# Patient Record
Sex: MALE | Race: WHITE | Employment: OTHER | URBAN - METROPOLITAN AREA
[De-identification: names, ages, dates, MRNs, and addresses within clinical notes are randomized per-mention and may not be internally consistent; named-entity substitution may affect disease eponyms.]

---

## 2017-06-01 ENCOUNTER — HOSPITAL ENCOUNTER (OUTPATIENT)
Dept: LAB | Age: 77
Discharge: HOME | End: 2017-06-01
Attending: INTERNAL MEDICINE
Payer: MEDICARE

## 2017-06-01 DIAGNOSIS — R06.02 SHORTNESS OF BREATH: ICD-10-CM

## 2017-06-01 LAB
ANION GAP BLD CALC-SCNC: 9 MMOL/L
BASOPHILS # BLD AUTO: 0 K/UL (ref 0–0.2)
BASOPHILS # BLD: 1 % (ref 0–2)
BUN SERPL-MCNC: 18 MG/DL (ref 8–23)
CALCIUM SERPL-MCNC: 9.3 MG/DL (ref 8.3–10.4)
CHLORIDE SERPL-SCNC: 102 MMOL/L (ref 98–107)
CO2 SERPL-SCNC: 26 MMOL/L (ref 21–32)
CREAT SERPL-MCNC: 1.2 MG/DL (ref 0.8–1.5)
DIFFERENTIAL METHOD BLD: ABNORMAL
EOSINOPHIL # BLD: 0.2 K/UL (ref 0–0.8)
EOSINOPHIL NFR BLD: 3 % (ref 0.5–7.8)
ERYTHROCYTE [DISTWIDTH] IN BLOOD BY AUTOMATED COUNT: 13.4 % (ref 11.9–14.6)
GLUCOSE SERPL-MCNC: 99 MG/DL (ref 65–100)
HCT VFR BLD AUTO: 42.7 % (ref 41.1–50.3)
HGB BLD-MCNC: 14.5 G/DL (ref 13.6–17.2)
INR PPP: 1 (ref 0.9–1.2)
LYMPHOCYTES # BLD AUTO: 34 % (ref 13–44)
LYMPHOCYTES # BLD: 2.6 K/UL (ref 0.5–4.6)
MCH RBC QN AUTO: 29.9 PG (ref 26.1–32.9)
MCHC RBC AUTO-ENTMCNC: 34 G/DL (ref 31.4–35)
MCV RBC AUTO: 88 FL (ref 79.6–97.8)
MONOCYTES # BLD: 0.6 K/UL (ref 0.1–1.3)
MONOCYTES NFR BLD AUTO: 8 % (ref 4–12)
NEUTS SEG # BLD: 4.1 K/UL (ref 1.7–8.2)
NEUTS SEG NFR BLD AUTO: 54 % (ref 43–78)
PLATELET # BLD AUTO: 344 K/UL (ref 150–450)
PMV BLD AUTO: 8.7 FL (ref 10.8–14.1)
POTASSIUM SERPL-SCNC: 4 MMOL/L (ref 3.5–5.1)
PROTHROMBIN TIME: 10.3 SEC (ref 9.6–12)
RBC # BLD AUTO: 4.85 M/UL (ref 4.23–5.67)
SODIUM SERPL-SCNC: 137 MMOL/L (ref 136–145)
WBC # BLD AUTO: 7.6 K/UL (ref 4.3–11.1)

## 2017-06-01 PROCEDURE — 85610 PROTHROMBIN TIME: CPT | Performed by: INTERNAL MEDICINE

## 2017-06-01 PROCEDURE — 80048 BASIC METABOLIC PNL TOTAL CA: CPT | Performed by: INTERNAL MEDICINE

## 2017-06-01 PROCEDURE — 85025 COMPLETE CBC W/AUTO DIFF WBC: CPT | Performed by: INTERNAL MEDICINE

## 2017-06-01 PROCEDURE — 36415 COLL VENOUS BLD VENIPUNCTURE: CPT | Performed by: INTERNAL MEDICINE

## 2017-06-13 NOTE — PROGRESS NOTES
Patient pre-assessment complete for [facility] poss with DR Chelsie Freitas scheduled for 17 at 9:30am, arrival time 7:30am. Patient verified using . Patient instructed to bring all home medications in labeled bottles on the day of procedure. NPO status reinforced. Patient informed to take a full dose aspirin 325mg  or 81 mg x 4 on the day of procedure. Patient instructed to HOLD lisinopril/hctz. Instructed they can take all other medications excluding vitamins & supplements. Patient verbalizes understanding of all instructions & denies any questions at this time.

## 2017-06-14 ENCOUNTER — HOSPITAL ENCOUNTER (INPATIENT)
Dept: CARDIAC CATH/INVASIVE PROCEDURES | Age: 77
LOS: 5 days | Discharge: HOME HEALTH | DRG: 234 | End: 2017-06-19
Attending: INTERNAL MEDICINE | Admitting: INTERNAL MEDICINE
Payer: MEDICARE

## 2017-06-14 ENCOUNTER — APPOINTMENT (OUTPATIENT)
Dept: GENERAL RADIOLOGY | Age: 77
DRG: 234 | End: 2017-06-14
Attending: THORACIC SURGERY (CARDIOTHORACIC VASCULAR SURGERY)
Payer: MEDICARE

## 2017-06-14 ENCOUNTER — APPOINTMENT (OUTPATIENT)
Dept: ULTRASOUND IMAGING | Age: 77
DRG: 234 | End: 2017-06-14
Attending: THORACIC SURGERY (CARDIOTHORACIC VASCULAR SURGERY)
Payer: MEDICARE

## 2017-06-14 ENCOUNTER — ANESTHESIA EVENT (OUTPATIENT)
Dept: SURGERY | Age: 77
DRG: 234 | End: 2017-06-14
Payer: MEDICARE

## 2017-06-14 DIAGNOSIS — I20.0 UNSTABLE ANGINA (HCC): ICD-10-CM

## 2017-06-14 DIAGNOSIS — E87.0 HYPERNATREMIA: ICD-10-CM

## 2017-06-14 DIAGNOSIS — J98.11 ATELECTASIS: ICD-10-CM

## 2017-06-14 DIAGNOSIS — Z95.1 S/P CABG X 2: ICD-10-CM

## 2017-06-14 DIAGNOSIS — R09.02 HYPOXIA: ICD-10-CM

## 2017-06-14 DIAGNOSIS — Z99.11 ENCOUNTER FOR WEANING FROM VENTILATOR (HCC): ICD-10-CM

## 2017-06-14 LAB
ALBUMIN SERPL BCP-MCNC: 3.3 G/DL (ref 3.2–4.6)
ALBUMIN/GLOB SERPL: 0.9 {RATIO} (ref 1.2–3.5)
ALP SERPL-CCNC: 50 U/L (ref 50–136)
ALT SERPL-CCNC: 35 U/L (ref 12–65)
ANION GAP BLD CALC-SCNC: 11 MMOL/L (ref 7–16)
APPEARANCE UR: CLEAR
APTT PPP: 33.6 SEC (ref 23.5–31.7)
AST SERPL W P-5'-P-CCNC: 24 U/L (ref 15–37)
ATRIAL RATE: 60 BPM
BACTERIA SPEC CULT: NORMAL
BILIRUB SERPL-MCNC: 0.3 MG/DL (ref 0.2–1.1)
BILIRUB UR QL: NEGATIVE
BUN SERPL-MCNC: 13 MG/DL (ref 8–23)
CALCIUM SERPL-MCNC: 8.4 MG/DL (ref 8.3–10.4)
CALCULATED P AXIS, ECG09: 68 DEGREES
CALCULATED R AXIS, ECG10: 45 DEGREES
CALCULATED T AXIS, ECG11: 64 DEGREES
CHLORIDE SERPL-SCNC: 104 MMOL/L (ref 98–107)
CHOLEST SERPL-MCNC: 156 MG/DL
CO2 SERPL-SCNC: 26 MMOL/L (ref 21–32)
COLOR UR: YELLOW
CREAT SERPL-MCNC: 1.31 MG/DL (ref 0.8–1.5)
DIAGNOSIS, 93000: NORMAL
ERYTHROCYTE [DISTWIDTH] IN BLOOD BY AUTOMATED COUNT: 14 % (ref 11.9–14.6)
EST. AVERAGE GLUCOSE BLD GHB EST-MCNC: 143 MG/DL
GLOBULIN SER CALC-MCNC: 3.5 G/DL (ref 2.3–3.5)
GLUCOSE SERPL-MCNC: 142 MG/DL (ref 65–100)
GLUCOSE UR STRIP.AUTO-MCNC: NEGATIVE MG/DL
HBA1C MFR BLD: 6.6 % (ref 4.8–6)
HCT VFR BLD AUTO: 39.6 % (ref 41.1–50.3)
HDLC SERPL-MCNC: 49 MG/DL (ref 40–60)
HDLC SERPL: 3.2 {RATIO}
HGB BLD-MCNC: 13.7 G/DL (ref 13.6–17.2)
HGB UR QL STRIP: NEGATIVE
INR PPP: 1 (ref 0.9–1.2)
KETONES UR QL STRIP.AUTO: NEGATIVE MG/DL
LDLC SERPL CALC-MCNC: 41.2 MG/DL
LEUKOCYTE ESTERASE UR QL STRIP.AUTO: NEGATIVE
LIPID PROFILE,FLP: ABNORMAL
MCH RBC QN AUTO: 30.1 PG (ref 26.1–32.9)
MCHC RBC AUTO-ENTMCNC: 34.6 G/DL (ref 31.4–35)
MCV RBC AUTO: 87 FL (ref 79.6–97.8)
NITRITE UR QL STRIP.AUTO: NEGATIVE
P-R INTERVAL, ECG05: 180 MS
PH UR STRIP: 6 [PH] (ref 5–9)
PLATELET # BLD AUTO: 259 K/UL (ref 150–450)
PMV BLD AUTO: 9.5 FL (ref 10.8–14.1)
POTASSIUM SERPL-SCNC: 3.8 MMOL/L (ref 3.5–5.1)
PROT SERPL-MCNC: 6.8 G/DL (ref 6.3–8.2)
PROT UR STRIP-MCNC: NEGATIVE MG/DL
PROTHROMBIN TIME: 10.6 SEC (ref 9.6–12)
Q-T INTERVAL, ECG07: 402 MS
QRS DURATION, ECG06: 84 MS
QTC CALCULATION (BEZET), ECG08: 402 MS
RBC # BLD AUTO: 4.55 M/UL (ref 4.23–5.67)
SERVICE CMNT-IMP: NORMAL
SODIUM SERPL-SCNC: 141 MMOL/L (ref 136–145)
SP GR UR REFRACTOMETRY: 1.01 (ref 1–1.02)
TRIGL SERPL-MCNC: 329 MG/DL (ref 35–150)
UROBILINOGEN UR QL STRIP.AUTO: 0.2 EU/DL (ref 0.2–1)
VENTRICULAR RATE, ECG03: 60 BPM
VLDLC SERPL CALC-MCNC: 65.8 MG/DL (ref 6–23)
WBC # BLD AUTO: 7.3 K/UL (ref 4.3–11.1)

## 2017-06-14 PROCEDURE — 74011636320 HC RX REV CODE- 636/320: Performed by: INTERNAL MEDICINE

## 2017-06-14 PROCEDURE — 77030015766

## 2017-06-14 PROCEDURE — 71020 XR CHEST PA LAT: CPT

## 2017-06-14 PROCEDURE — 87641 MR-STAPH DNA AMP PROBE: CPT | Performed by: INTERNAL MEDICINE

## 2017-06-14 PROCEDURE — 74011250637 HC RX REV CODE- 250/637: Performed by: THORACIC SURGERY (CARDIOTHORACIC VASCULAR SURGERY)

## 2017-06-14 PROCEDURE — 74011250636 HC RX REV CODE- 250/636: Performed by: INTERNAL MEDICINE

## 2017-06-14 PROCEDURE — 93005 ELECTROCARDIOGRAM TRACING: CPT | Performed by: INTERNAL MEDICINE

## 2017-06-14 PROCEDURE — 36415 COLL VENOUS BLD VENIPUNCTURE: CPT | Performed by: THORACIC SURGERY (CARDIOTHORACIC VASCULAR SURGERY)

## 2017-06-14 PROCEDURE — C1769 GUIDE WIRE: HCPCS

## 2017-06-14 PROCEDURE — 86900 BLOOD TYPING SEROLOGIC ABO: CPT | Performed by: THORACIC SURGERY (CARDIOTHORACIC VASCULAR SURGERY)

## 2017-06-14 PROCEDURE — 80053 COMPREHEN METABOLIC PANEL: CPT | Performed by: THORACIC SURGERY (CARDIOTHORACIC VASCULAR SURGERY)

## 2017-06-14 PROCEDURE — 80061 LIPID PANEL: CPT | Performed by: THORACIC SURGERY (CARDIOTHORACIC VASCULAR SURGERY)

## 2017-06-14 PROCEDURE — 65660000000 HC RM CCU STEPDOWN

## 2017-06-14 PROCEDURE — 74011000250 HC RX REV CODE- 250: Performed by: INTERNAL MEDICINE

## 2017-06-14 PROCEDURE — 85610 PROTHROMBIN TIME: CPT | Performed by: THORACIC SURGERY (CARDIOTHORACIC VASCULAR SURGERY)

## 2017-06-14 PROCEDURE — 74011250636 HC RX REV CODE- 250/636

## 2017-06-14 PROCEDURE — 85730 THROMBOPLASTIN TIME PARTIAL: CPT | Performed by: INTERNAL MEDICINE

## 2017-06-14 PROCEDURE — B2111ZZ FLUOROSCOPY OF MULTIPLE CORONARY ARTERIES USING LOW OSMOLAR CONTRAST: ICD-10-PCS | Performed by: INTERNAL MEDICINE

## 2017-06-14 PROCEDURE — C8929 TTE W OR WO FOL WCON,DOPPLER: HCPCS

## 2017-06-14 PROCEDURE — C1894 INTRO/SHEATH, NON-LASER: HCPCS

## 2017-06-14 PROCEDURE — 77030029997 HC DEV COM RDL R BND TELE -B

## 2017-06-14 PROCEDURE — 93880 EXTRACRANIAL BILAT STUDY: CPT

## 2017-06-14 PROCEDURE — 99152 MOD SED SAME PHYS/QHP 5/>YRS: CPT

## 2017-06-14 PROCEDURE — B2151ZZ FLUOROSCOPY OF LEFT HEART USING LOW OSMOLAR CONTRAST: ICD-10-PCS | Performed by: INTERNAL MEDICINE

## 2017-06-14 PROCEDURE — 93458 L HRT ARTERY/VENTRICLE ANGIO: CPT

## 2017-06-14 PROCEDURE — 83036 HEMOGLOBIN GLYCOSYLATED A1C: CPT | Performed by: THORACIC SURGERY (CARDIOTHORACIC VASCULAR SURGERY)

## 2017-06-14 PROCEDURE — 85027 COMPLETE CBC AUTOMATED: CPT | Performed by: THORACIC SURGERY (CARDIOTHORACIC VASCULAR SURGERY)

## 2017-06-14 PROCEDURE — 86923 COMPATIBILITY TEST ELECTRIC: CPT | Performed by: THORACIC SURGERY (CARDIOTHORACIC VASCULAR SURGERY)

## 2017-06-14 PROCEDURE — 74011250636 HC RX REV CODE- 250/636: Performed by: PHYSICIAN ASSISTANT

## 2017-06-14 PROCEDURE — 77030004534 HC CATH ANGI DX INFN CARD -A

## 2017-06-14 PROCEDURE — 74011250637 HC RX REV CODE- 250/637: Performed by: INTERNAL MEDICINE

## 2017-06-14 PROCEDURE — 4A023N7 MEASUREMENT OF CARDIAC SAMPLING AND PRESSURE, LEFT HEART, PERCUTANEOUS APPROACH: ICD-10-PCS | Performed by: INTERNAL MEDICINE

## 2017-06-14 PROCEDURE — 81003 URINALYSIS AUTO W/O SCOPE: CPT | Performed by: THORACIC SURGERY (CARDIOTHORACIC VASCULAR SURGERY)

## 2017-06-14 RX ORDER — SODIUM CHLORIDE 9 MG/ML
75 INJECTION, SOLUTION INTRAVENOUS CONTINUOUS
Status: DISCONTINUED | OUTPATIENT
Start: 2017-06-14 | End: 2017-06-15

## 2017-06-14 RX ORDER — HEPARIN SODIUM 5000 [USP'U]/100ML
12-25 INJECTION, SOLUTION INTRAVENOUS CONTINUOUS
Status: DISCONTINUED | OUTPATIENT
Start: 2017-06-14 | End: 2017-06-14

## 2017-06-14 RX ORDER — MIDAZOLAM HYDROCHLORIDE 1 MG/ML
.5-2 INJECTION, SOLUTION INTRAMUSCULAR; INTRAVENOUS
Status: DISCONTINUED | OUTPATIENT
Start: 2017-06-14 | End: 2017-06-15

## 2017-06-14 RX ORDER — SODIUM CHLORIDE 0.9 % (FLUSH) 0.9 %
5-10 SYRINGE (ML) INJECTION AS NEEDED
Status: DISCONTINUED | OUTPATIENT
Start: 2017-06-14 | End: 2017-06-15

## 2017-06-14 RX ORDER — LIDOCAINE HYDROCHLORIDE 20 MG/ML
60-200 INJECTION, SOLUTION INFILTRATION; PERINEURAL
Status: DISCONTINUED | OUTPATIENT
Start: 2017-06-14 | End: 2017-06-15

## 2017-06-14 RX ORDER — GUAIFENESIN 100 MG/5ML
81 LIQUID (ML) ORAL DAILY
Status: DISCONTINUED | OUTPATIENT
Start: 2017-06-14 | End: 2017-06-15

## 2017-06-14 RX ORDER — HEPARIN SODIUM 200 [USP'U]/100ML
3 INJECTION, SOLUTION INTRAVENOUS CONTINUOUS
Status: DISCONTINUED | OUTPATIENT
Start: 2017-06-14 | End: 2017-06-15

## 2017-06-14 RX ORDER — GUAIFENESIN 100 MG/5ML
81-324 LIQUID (ML) ORAL ONCE
Status: ACTIVE | OUTPATIENT
Start: 2017-06-14 | End: 2017-06-14

## 2017-06-14 RX ORDER — DIAZEPAM 5 MG/1
5 TABLET ORAL ONCE
Status: ACTIVE | OUTPATIENT
Start: 2017-06-14 | End: 2017-06-14

## 2017-06-14 RX ORDER — AMIODARONE HYDROCHLORIDE 200 MG/1
400 TABLET ORAL 2 TIMES DAILY
Status: DISCONTINUED | OUTPATIENT
Start: 2017-06-14 | End: 2017-06-15

## 2017-06-14 RX ORDER — ATORVASTATIN CALCIUM 40 MG/1
40 TABLET, FILM COATED ORAL
Status: DISCONTINUED | OUTPATIENT
Start: 2017-06-14 | End: 2017-06-15

## 2017-06-14 RX ORDER — HEPARIN SODIUM 5000 [USP'U]/ML
35 INJECTION, SOLUTION INTRAVENOUS; SUBCUTANEOUS ONCE
Status: COMPLETED | OUTPATIENT
Start: 2017-06-14 | End: 2017-06-14

## 2017-06-14 RX ORDER — SODIUM CHLORIDE 0.9 % (FLUSH) 0.9 %
5-10 SYRINGE (ML) INJECTION EVERY 8 HOURS
Status: DISCONTINUED | OUTPATIENT
Start: 2017-06-14 | End: 2017-06-15

## 2017-06-14 RX ORDER — FENTANYL CITRATE 50 UG/ML
25-100 INJECTION, SOLUTION INTRAMUSCULAR; INTRAVENOUS
Status: DISCONTINUED | OUTPATIENT
Start: 2017-06-14 | End: 2017-06-15

## 2017-06-14 RX ORDER — LISINOPRIL AND HYDROCHLOROTHIAZIDE 12.5; 2 MG/1; MG/1
1 TABLET ORAL DAILY
Status: DISCONTINUED | OUTPATIENT
Start: 2017-06-14 | End: 2017-06-14

## 2017-06-14 RX ORDER — CARVEDILOL 3.12 MG/1
3.12 TABLET ORAL 2 TIMES DAILY WITH MEALS
Status: DISCONTINUED | OUTPATIENT
Start: 2017-06-14 | End: 2017-06-15

## 2017-06-14 RX ORDER — HEPARIN SODIUM 5000 [USP'U]/ML
4000 INJECTION, SOLUTION INTRAVENOUS; SUBCUTANEOUS ONCE
Status: COMPLETED | OUTPATIENT
Start: 2017-06-14 | End: 2017-06-14

## 2017-06-14 RX ORDER — HEPARIN SODIUM 5000 [USP'U]/100ML
12-25 INJECTION, SOLUTION INTRAVENOUS
Status: DISCONTINUED | OUTPATIENT
Start: 2017-06-14 | End: 2017-06-15

## 2017-06-14 RX ORDER — SIMVASTATIN 40 MG/1
40 TABLET, FILM COATED ORAL
Status: DISCONTINUED | OUTPATIENT
Start: 2017-06-14 | End: 2017-06-14

## 2017-06-14 RX ADMIN — FENTANYL CITRATE 25 MCG: 50 INJECTION, SOLUTION INTRAMUSCULAR; INTRAVENOUS at 09:36

## 2017-06-14 RX ADMIN — PERFLUTREN 1 ML: 6.52 INJECTION, SUSPENSION INTRAVENOUS at 12:00

## 2017-06-14 RX ADMIN — SODIUM CHLORIDE 75 ML/HR: 900 INJECTION, SOLUTION INTRAVENOUS at 09:15

## 2017-06-14 RX ADMIN — HEPARIN SODIUM 2 ML: 10000 INJECTION, SOLUTION INTRAVENOUS; SUBCUTANEOUS at 09:39

## 2017-06-14 RX ADMIN — HEPARIN SODIUM 3 ML/HR: 200 INJECTION, SOLUTION INTRAVENOUS at 09:38

## 2017-06-14 RX ADMIN — MIDAZOLAM HYDROCHLORIDE 2 MG: 1 INJECTION, SOLUTION INTRAMUSCULAR; INTRAVENOUS at 09:36

## 2017-06-14 RX ADMIN — ATORVASTATIN CALCIUM 40 MG: 40 TABLET, FILM COATED ORAL at 20:52

## 2017-06-14 RX ADMIN — CARVEDILOL 3.12 MG: 3.12 TABLET, FILM COATED ORAL at 17:48

## 2017-06-14 RX ADMIN — AMIODARONE HYDROCHLORIDE 400 MG: 200 TABLET ORAL at 17:48

## 2017-06-14 RX ADMIN — LIDOCAINE HYDROCHLORIDE 60 MG: 20 INJECTION, SOLUTION INFILTRATION; PERINEURAL at 09:38

## 2017-06-14 RX ADMIN — LISINOPRIL AND HYDROCHLOROTHIAZIDE 1 TABLET: 12.5; 2 TABLET ORAL at 11:40

## 2017-06-14 RX ADMIN — Medication 10 ML: at 14:30

## 2017-06-14 RX ADMIN — HEPARIN SODIUM 2750 UNITS: 5000 INJECTION, SOLUTION INTRAVENOUS; SUBCUTANEOUS at 19:20

## 2017-06-14 RX ADMIN — HEPARIN SODIUM AND DEXTROSE 12 UNITS/KG/HR: 5000; 5 INJECTION INTRAVENOUS at 11:39

## 2017-06-14 RX ADMIN — HEPARIN SODIUM 4000 UNITS: 5000 INJECTION, SOLUTION INTRAVENOUS; SUBCUTANEOUS at 11:39

## 2017-06-14 RX ADMIN — IOPAMIDOL 45 ML: 755 INJECTION, SOLUTION INTRAVENOUS at 09:53

## 2017-06-14 NOTE — PROGRESS NOTES
TRANSFER - OUT REPORT:    Verbal report given to Relda Bernheim, RN (name) on Margot Pierson  being transferred to [address] [place] [address] ([facility]) for routine progression of care       Report consisted of patients Situation, Background, Assessment and   Recommendations(SBAR). Information from the following report(s) SBAR was reviewed with the receiving nurse. Lines:   Peripheral IV 06/14/17 Right Antecubital (Active)       Peripheral IV 06/14/17 Left Wrist (Active)        Opportunity for questions and clarification was provided. Patient transported with:   Tech         Pt had LHC via R radial. Site closed with TR band and 11mL @ 0954. Pt received Versed 2mg IV and Fentanyl 25mcg IV.

## 2017-06-14 NOTE — PROGRESS NOTES
Incentive spirometry teaching completed per protocol preoperatively for CABG. Patient achieved 1250 mL, so the goal for to strive for next time was placed at 1250 mL. Patient tolerated well. Pt was encouraged to continue practicing the technique prior to surgery. Will continue to monitor the patient's progress.

## 2017-06-14 NOTE — IP AVS SNAPSHOT
Current Discharge Medication List  
  
START taking these medications Dose & Instructions Dispensing Information Comments Morning Noon Evening Bedtime  
 acetaminophen 325 mg tablet Commonly known as:  TYLENOL Take for pain as needed as on label Quantity:  30 Tab Refills:  0  
     
   
   
   
  
 ferrous sulfate 325 mg (65 mg iron) tablet Your next dose is:  Tomorrow Dose:  325 mg Take 1 Tab by mouth daily (with breakfast). Quantity:  30 Tab Refills:  0  
     
  
   
   
   
  
 furosemide 40 mg tablet Commonly known as:  LASIX Your next dose is:  Tomorrow Dose:  40 mg Take 1 Tab by mouth daily for 6 days. Quantity:  6 Tab Refills:  0  
     
  
   
   
   
  
 metFORMIN 500 mg tablet Commonly known as:  GLUCOPHAGE Your next dose is: Today Dose:  500 mg Take 1 Tab by mouth two (2) times daily (with meals). Quantity:  60 Tab Refills:  1  
     
   
   
  
   
  
 metoprolol succinate 50 mg XL tablet Commonly known as:  TOPROL-XL Your next dose is:  Tomorrow Dose:  50 mg Take 1 Tab by mouth daily. Quantity:  30 Tab Refills:  5  
     
  
   
   
   
  
 potassium chloride 20 mEq tablet Commonly known as:  K-DUR, KLOR-CON Your next dose is:  Tomorrow Dose:  20 mEq Take 1 Tab by mouth daily for 6 days. Quantity:  6 Tab Refills:  0  
     
  
   
   
   
  
 senna-docusate 8.6-50 mg per tablet Commonly known as:  Clenton Plenty Your next dose is: Today Dose:  2 Tab Take 2 Tabs by mouth two (2) times a day. Quantity:  30 Tab Refills:  0  
     
   
   
   
  
  
 traMADol 50 mg tablet Commonly known as:  ULTRAM  
   
 Dose:  50 mg Take 1 Tab by mouth every six (6) hours as needed. Max Daily Amount: 200 mg. Quantity:  30 Tab Refills:  1 CONTINUE these medications which have NOT CHANGED Dose & Instructions Dispensing Information Comments Morning Noon Evening Bedtime  
 aspirin 81 mg chewable tablet Your next dose is:  Tomorrow Dose:  81 mg Take 81 mg by mouth daily. Refills:  0  
     
  
   
   
   
  
 lisinopril-hydroCHLOROthiazide 20-12.5 mg per tablet Commonly known as:  Ranjana Oscar Your next dose is:  Tomorrow Take  by mouth daily. Refills:  0  
     
  
   
   
   
  
 multivitamin tablet Commonly known as:  ONE A DAY Your next dose is:  Tomorrow Dose:  1 Tab Take 1 Tab by mouth daily. Refills:  0  
     
  
   
   
   
  
 simvastatin 40 mg tablet Commonly known as:  ZOCOR Your next dose is: Today Dose:  40 mg Take 40 mg by mouth nightly. Refills:  0 Where to Get Your Medications Information on where to get these meds will be given to you by the nurse or doctor. ! Ask your nurse or doctor about these medications  
  acetaminophen 325 mg tablet  
 ferrous sulfate 325 mg (65 mg iron) tablet  
 furosemide 40 mg tablet  
 metFORMIN 500 mg tablet  
 metoprolol succinate 50 mg XL tablet  
 potassium chloride 20 mEq tablet  
 senna-docusate 8.6-50 mg per tablet  
 traMADol 50 mg tablet

## 2017-06-14 NOTE — IP AVS SNAPSHOT
Cara Oscar 
 
 
 [address] [facility] [address] [facility] 
[phone] Patient: Lourdes Curling MRN: [id number] FUT:[id number] You are allergic to the following No active allergies Immunizations Administered for This Admission Name Date  
 TB Skin Test (PPD) Intradermal 6/15/2017 Recent Documentation Height Weight BMI Smoking Status 1.676 m 83.7 kg 29.78 kg/m2 Never Smoker Emergency Contacts Name Discharge Info Relation Home Work Mobile Alejandra 27 CAREGIVER [3] Spouse [3] [phone] About your hospitalization You were admitted on:  June 14, 2017 You last received care in the:  [facility] 2 CV STEPDOWN You were discharged on:  June 19, 2017 Unit phone number:  [phone] Why you were hospitalized Your primary diagnosis was:  Cad (Coronary Artery Disease) Your diagnoses also included:  Unstable Angina (Hcc), S/P Cabg X 2, Hypoxia, Hypernatremia, Atelectasis, Diabetes Mellitus, New Onset (Hcc) Providers Seen During Your Hospitalizations Provider Role Specialty Primary office phone Rosibel Joshi MD Attending Provider Cardiology [phone] Your Primary Care Physician (PCP) Primary Care Physician Office Phone Office Fax [facility] [phone] Follow-up Information Follow up With Details Comments Contact Info Angella De Leon MD   [facility] [address] [address] 
[phone] Debi Bedoya MD On 7/13/2017 On Thursday at 2:00 pm [address] [place] [address] [place] [id number] [phone] Estela On 6/26/2017 On Monday at 9:30 am DR. [facility] 
[phone] Your Appointments Monday June 26, 2017  9:30 AM EDT TRANSITIONAL CARE MANAGEMENT with Vito Grossman MD  
 [facility] ([address] [place] [address]) [address]  
[phone] Thursday July 13, 2017  2:00 PM EDT Discharge Followup with Melia Tenorio MD  
[address] [facility] ([facility] [address] Karyn [address] [place] [id number]  
[phone] Current Discharge Medication List  
  
START taking these medications Dose & Instructions Dispensing Information Comments Morning Noon Evening Bedtime  
 acetaminophen 325 mg tablet Commonly known as:  TYLENOL Take for pain as needed as on label Quantity:  30 Tab Refills:  0  
     
   
   
   
  
 ferrous sulfate 325 mg (65 mg iron) tablet Your next dose is:  Tomorrow Dose:  325 mg Take 1 Tab by mouth daily (with breakfast). Quantity:  30 Tab Refills:  0  
     
  
   
   
   
  
 furosemide 40 mg tablet Commonly known as:  LASIX Your next dose is:  Tomorrow Dose:  40 mg Take 1 Tab by mouth daily for 6 days. Quantity:  6 Tab Refills:  0  
     
  
   
   
   
  
 metFORMIN 500 mg tablet Commonly known as:  GLUCOPHAGE Your next dose is: Today Dose:  500 mg Take 1 Tab by mouth two (2) times daily (with meals). Quantity:  60 Tab Refills:  1  
     
   
   
  
   
  
 metoprolol succinate 50 mg XL tablet Commonly known as:  TOPROL-XL Your next dose is:  Tomorrow Dose:  50 mg Take 1 Tab by mouth daily. Quantity:  30 Tab Refills:  5  
     
  
   
   
   
  
 potassium chloride 20 mEq tablet Commonly known as:  K-DUR, KLOR-CON Your next dose is:  Tomorrow Dose:  20 mEq Take 1 Tab by mouth daily for 6 days. Quantity:  6 Tab Refills:  0  
     
  
   
   
   
  
 senna-docusate 8.6-50 mg per tablet Commonly known as:  Jeral Bevel Your next dose is: Today Dose:  2 Tab Take 2 Tabs by mouth two (2) times a day. Quantity:  30 Tab Refills:  0  
     
   
   
   
  
  
 traMADol 50 mg tablet Commonly known as:  ULTRAM  
   
 Dose:  50 mg Take 1 Tab by mouth every six (6) hours as needed. Max Daily Amount: 200 mg. Quantity:  30 Tab Refills:  1 CONTINUE these medications which have NOT CHANGED Dose & Instructions Dispensing Information Comments Morning Noon Evening Bedtime  
 aspirin 81 mg chewable tablet Your next dose is:  Tomorrow Dose:  81 mg Take 81 mg by mouth daily. Refills:  0  
     
  
   
   
   
  
 lisinopril-hydroCHLOROthiazide 20-12.5 mg per tablet Commonly known as:  Finesse Phalen Your next dose is:  Tomorrow Take  by mouth daily. Refills:  0  
     
  
   
   
   
  
 multivitamin tablet Commonly known as:  ONE A DAY Your next dose is:  Tomorrow Dose:  1 Tab Take 1 Tab by mouth daily. Refills:  0  
     
  
   
   
   
  
 simvastatin 40 mg tablet Commonly known as:  ZOCOR Your next dose is: Today Dose:  40 mg Take 40 mg by mouth nightly. Refills:  0 Where to Get Your Medications Information on where to get these meds will be given to you by the nurse or doctor. ! Ask your nurse or doctor about these medications  
  acetaminophen 325 mg tablet  
 ferrous sulfate 325 mg (65 mg iron) tablet  
 furosemide 40 mg tablet  
 metFORMIN 500 mg tablet  
 metoprolol succinate 50 mg XL tablet  
 potassium chloride 20 mEq tablet  
 senna-docusate 8.6-50 mg per tablet  
 traMADol 50 mg tablet Discharge Instructions DISCHARGE SUMMARY from Nurse The following personal items are in your possession at time of discharge: 
 
Dental Appliances: Uppers, Partials Visual Aid: Glasses, With patient Home Medications: Sent home Jewelry: Ring Clothing: At bedside, Footwear, Pants, Shirt, Socks Other Valuables: None PATIENT INSTRUCTIONS: 
 
 
F-face looks uneven A-arms unable to move or move unevenly S-speech slurred or non-existent T-time-call 911 as soon as signs and symptoms begin-DO NOT go Back to bed or wait to see if you get better-TIME IS BRAIN. Warning Signs of HEART ATTACK Call 911 if you have these symptoms: 
? Chest discomfort. Most heart attacks involve discomfort in the center of the chest that lasts more than a few minutes, or that goes away and comes back. It can feel like uncomfortable pressure, squeezing, fullness, or pain. ? Discomfort in other areas of the upper body. Symptoms can include pain or discomfort in one or both arms, the back, neck, jaw, or stomach. ? Shortness of breath with or without chest discomfort. ? Other signs may include breaking out in a cold sweat, nausea, or lightheadedness. Don't wait more than five minutes to call [address]! Fast action can save your life. Calling 911 is almost always the fastest way to get lifesaving treatment. Emergency Medical Services staff can begin treatment when they arrive  up to an hour sooner than if someone gets to the hospital by car. The discharge information has been reviewed with the patient. The patient verbalized understanding. Discharge medications reviewed with the patient and appropriate educational materials and side effects teaching were provided. Discharge Instructions Attachments/References CORONARY ARTERY BYPASS GRAFT: POST-OP (ENGLISH) CARDIAC REHABILITATION (ENGLISH) HEALTHY DIET: HEART (ENGLISH) HEART ATTACK: MEDICINE TO REDUCE RISK (ENGLISH) SECONDHAND SMOKE (ENGLISH) Discharge Orders Procedure Order Date Status Priority Quantity Spec Type Associated Dx REFERRAL TO CARDIAC REHAB 06/19/17 1031 Normal Routine 1  S/P CABG x 2 [[id number]] [employer] Announcement  We are excited to announce that we are making your provider's discharge notes available to you in deltamethod. You will see these notes when they are completed and signed by the physician that discharged you from your recent hospital stay. If you have any questions or concerns about any information you see in deltamethod, please call the Health Information Department where you were seen or reach out to your Primary Care Provider for more information about your plan of care. Introducing [facility] HE[facility]! Latasha Barth introduces deltamethod patient portal. Now you can access parts of your medical record, email your doctor's office, and request medication refills online. 1. In your internet browser, go to [email or web address] Maps 2. Click on the First Time User? Click Here link in the Sign In box. You will see the New Member Sign Up page. 3. Enter your deltamethod Access Code exactly as it appears below. You will not need to use this code after youve completed the sign-up process. If you do not sign up before the expiration date, you must request a new code. · deltamethod Access Code: [email or web address] Expires: 8/2/2017  1:38 PM 
 
4. Enter the last four digits of your Social Security Number (xxxx) and Date of Birth (mm/dd/yyyy) as indicated and click Submit. You will be taken to the next sign-up page. 5. Create a deltamethod ID. This will be your deltamethod login ID and cannot be changed, so think of one that is secure and easy to remember. 6. Create a deltamethod password. You can change your password at any time. 7. Enter your Password Reset Question and Answer. This can be used at a later time if you forget your password. 8. Enter your e-mail address. You will receive e-mail notification when new information is available in [address] 9. Click Sign Up. You can now view and download portions of your medical record. 10. Click the Download Summary menu link to download a portable copy of your medical information. If you have questions, please visit the Frequently Asked Questions section of the Pet[employer] website. Remember, Pet[employer]ytes is NOT to be used for urgent needs. For medical emergencies, dial 911. Now available from your iPhone and Android! General Information Please provide this summary of care documentation to your next provider. Patient Signature:  ____________________________________________________________ Date:  ____________________________________________________________  
  
Angie Lightning Provider Signature:  ____________________________________________________________ Date:  ____________________________________________________________ More Information Coronary Artery Bypass Graft: What to Expect at Home Your Recovery Coronary artery bypass graft (CABG) is surgery to treat coronary artery disease. The surgery helps blood make a detour, or bypass, around one or more narrowed or blocked coronary arteries. Coronary arteries are the blood vessels that bring blood to the heart. Your doctor did the surgery through a cut, called an incision, in your chest. 
You will feel tired and sore for the first few weeks after surgery. You may have some brief, sharp pains on either side of your chest. Your chest, shoulders, and upper back may ache. The incision in your chest and the area where the healthy vein was taken may be sore or swollen. These symptoms usually get better after 4 to 6 weeks. You will probably be able to do many of your usual activities after 4 to 6 weeks. But for 2 to 3 months you will not be able to lift heavy objects or do activities that strain your chest or upper arm muscles. At first you may notice that you get tired easily and need to rest often. It may take 1 to 2 months to get your energy back. Some people find that they are more emotional after this surgery.  You may cry easily or show emotion in ways that are unusual for you. This is common and may last for up to a year. Some people get depressed after CABG surgery. Talk with your doctor if you have sadness that continues or you are concerned about how you are feeling. Treatment and other support can help you feel better. Even though the surgery may improve your symptoms, you will still need to make changes in your lifestyle to lower your risk of a heart attack or stroke. It will be important to eat a heart-healthy diet, get regular exercise, not smoke, take your heart medicines, and reduce stress. You will likely start a cardiac rehabilitation (rehab) program in the hospital. You will continue with this rehab program after you go home to help you recover and prevent problems with your heart. Talk to your doctor about whether rehab is right for you. This care sheet gives you a general idea about how long it will take for you to recover. But each person recovers at a different pace. Follow the steps below to get better as quickly as possible. How can you care for yourself at home? Activity · Rest when you feel tired. Getting enough sleep will help you recover. Try to sleep on your back for 4 to 6 weeks while your breastbone (sternum) heals. This usually takes about 4 to 6 weeks. · Try to walk each day. Start by walking a little more than you did the day before. Bit by bit, increase the amount you walk. Walking boosts blood flow and helps prevent pneumonia and constipation. · Avoid strenuous activities, such as bicycle riding, jogging, weight lifting, or heavy aerobic exercise, until your doctor says it is okay. · For 3 months, avoid activities that strain your chest or upper arm muscles. This includes pushing a  or vacuum, mopping floors, or swinging a golf club or tennis racquet. · For 2 to 3 months, avoid lifting anything that would make you strain. This may include a child, heavy grocery bags and milk containers, a heavy briefcase or backpack, or cat litter or dog food bags. · Hold a pillow firmly over your chest incision when you cough or take deep breaths. This will support your chest and reduce your pain. · Do breathing exercises at home as instructed by your doctor. This will help prevent pneumonia. · Ask your doctor when you can drive again. · You will probably need to take 4 to 12 weeks off from work. It depends on the type of work you do and how you feel. · You may shower as usual. Pat the incision dry. Do not take a bath for the first 3 weeks, or until your doctor tells you it is okay. · Do not swim or use a hot tub for at least 1 month, or until your doctor says it is okay. · Ask your doctor when it is okay for you to have sex. Diet · Eat a heart-healthy diet. If you have not been eating this way, talk to your doctor. You also may want to talk to a dietitian. A dietitian can help you learn about healthy foods. · Drink plenty of fluids (unless your doctor tells you not to). · You may notice that your bowel movements are not regular right after your surgery. This is common. Try to avoid constipation and straining with bowel movements. You may want to take a fiber supplement every day. If you have not had a bowel movement after a couple of days, ask your doctor about taking a mild laxative. Medicines · Your doctor will tell you if and when you can restart your medicines. He or she will also give you instructions about taking any new medicines. · If you take blood thinners, such as warfarin (Coumadin), clopidogrel (Plavix), or aspirin, be sure to talk to your doctor. He or she will tell you if and when to start taking those medicines again. Make sure that you understand exactly what your doctor wants you to do.  
· Your doctor may give you medicines to prevent blood clots, keep your heartbeat steady, and lower your blood pressure and cholesterol. Take your medicines exactly as prescribed. Call your doctor if you think you are having a problem with your medicine. · Be safe with medicines. Take pain medicines exactly as directed. ¨ If the doctor gave you a prescription medicine for pain, take it as prescribed. ¨ If you are not taking a prescription pain medicine, ask your doctor if you can take an over-the-counter medicine. ¨ Do not take aspirin, ibuprofen (Advil, Motrin), naproxen (Aleve), or other nonsteroidal anti-inflammatory drugs (NSAIDs) unless your doctor says it is okay. · If you think your pain medicine is making you sick to your stomach: 
¨ Take your medicine after meals (unless your doctor has told you not to). ¨ Ask your doctor for a different pain medicine. · If your doctor prescribed antibiotics, take them as directed. Do not stop taking them just because you feel better. You need to take the full course of antibiotics. Incision care · If you have strips of tape on the incisions the doctor made, leave the tape on for a week or until it falls off. · Wash the area daily with warm, soapy water, and pat it dry. Don't use hydrogen peroxide or alcohol, which can slow healing. You may cover the area with a gauze bandage if it weeps or rubs against clothing. Change the bandage every day. · Keep the area clean and dry. · If you have an incision in your leg: ¨ Wear support stockings on your legs during the day for the first 2 weeks. You can take the stockings off at night while you sleep. ¨ Raise your legs above the level of your heart whenever you lay down for the first 4 to 6 weeks. Other instructions · Keep track of your weight. Weigh yourself every day at the same time of day, on the same scale, in the same amount of clothing. A sudden increase in weight can be a sign of a problem with your heart.  Tell your doctor if you suddenly gain weight, such as 3 pounds or more in 2 to 3 days. · Do not smoke. Smoking can make it harder for you to recover and it will raise the chances of your arteries narrowing again. If you need help quitting, talk to your doctor about stop-smoking programs and medicines. These can increase your chances of quitting for good. Follow-up care is a key part of your treatment and safety. Be sure to make and go to all appointments, and call your doctor if you are having problems. It's also a good idea to know your test results and keep a list of the medicines you take. When should you call for help? Call 911 anytime you think you may need emergency care. For example, call if: 
· You passed out (lost consciousness). · You have severe trouble breathing. · You have sudden chest pain and shortness of breath, or you cough up blood. · You have severe pain in your chest. 
· You have symptoms of a heart attack. These may include: ¨ Chest pain or pressure, or a strange feeling in the chest. 
¨ Sweating. ¨ Shortness of breath. ¨ Nausea or vomiting. ¨ Pain, pressure, or a strange feeling in the back, neck, jaw, or upper belly or in one or both shoulders or arms. ¨ Lightheadedness or sudden weakness. ¨ A fast or irregular heartbeat. After you call 911, the  may tell you to chew 1 adult-strength or 2 to 4 low-dose aspirin. Wait for an ambulance. Do not try to drive yourself. · You have angina symptoms (such as chest pain or pressure) that do not go away with rest or are not getting better within 5 minutes after you take a dose of nitroglycerin. Call your doctor now or seek immediate medical care if: 
· You have pain that does not get better after you take pain medicine. · You have a fever over 100°F. 
· You have loose stitches, or your incision comes open. · Bright red blood has soaked through the bandage over your incision. · You have signs of infection, such as: ¨ Increased pain, swelling, warmth, or redness. ¨ Red streaks leading from the incision. ¨ Pus draining from the incision. ¨ Swollen lymph nodes in your neck, armpits, or groin. ¨ A fever. · You have signs of a blood clot, such as: 
¨ Pain in your calf, back of the knee, thigh, or groin. ¨ Redness and swelling in your leg or groin. · Your heartbeat feels very fast or slow, skips beats, or flutters. · You are dizzy or lightheaded, or you feel like you may faint. · You have new or increased shortness of breath. Watch closely for changes in your health, and be sure to contact your doctor if: 
· You gain weight suddenly, such as 3 pounds or more in 2 to 3 days. · You have increased swelling in your legs, ankles, or feet. · You have any concerns about your incision. · You feel very sad or have other signs of depression, such as trouble sleeping or eating. · You have questions about diet, exercise, quitting smoking, or stress reduction after surgery. Where can you learn more? Go to http://mamtaSpoonfeddixon.info/. Enter F759 in the search box to learn more about \"Coronary Artery Bypass Graft: What to Expect at Home. \" Current as of: January 27, 2016 Content Version: 11.2 © 7918-1653 [employer]. Care instructions adapted under license by [employer] (which disclaims liability or warranty for this information). If you have questions about a medical condition or this instruction, always ask your healthcare professional. [place] [address] any warranty or liability for your use of this information. Cardiac Rehabilitation: Care Instructions Your Care Instructions Cardiac rehabilitation is a program for people who have a heart problem, such as a heart attack, heart failure, or a heart valve disease.  The program includes exercise, lifestyle changes, education, and emotional support. Cardiac rehab can help you improve the quality of your life through better overall health. It can help you lose weight and feel better about yourself. On your cardiac rehab team, you may have your doctor, a nurse specialist, a dietitian, and a physical therapist. They will design your cardiac rehab program specifically for you. You will learn how to reduce your risk for heart problems, how to manage stress, and how to eat a heart-healthy diet. By the end of the program, you will be ready to maintain a healthier lifestyle on your own. Follow-up care is a key part of your treatment and safety. Be sure to make and go to all appointments, and call your doctor if you are having problems. It's also a good idea to know your test results and keep a list of the medicines you take. How can you care for yourself at home? · Take your medicines exactly as prescribed. Call your doctor if you think you are having a problem with your medicine. You will get more details on the specific medicines your doctor prescribes. · Weigh yourself every day if your doctor tells you to. Watch for sudden weight gain. Weigh yourself on the same scale with the same amount of clothing at the same time of day. · Plan your meals so that you are eating heart-healthy foods. ¨ Eat a variety of foods daily. Fresh fruits and vegetables and whole-grains are good choices. ¨ Limit your fat intake, especially saturated and trans fat. ¨ Limit salt (sodium). ¨ Increase fiber in your diet. ¨ Limit alcohol. · Learn how to take your pulse so that you can track your heart rate during exercise. · Always check with your doctor before you begin a new exercise program. 
· Warm up before you exercise and cool down afterward for at least 15 minutes each. This will help your heart gradually prepare for and recover from exercise and avoid pushing your heart too hard. · Stop exercising if you have any unusual discomfort, such as chest pain. · Do not smoke. Smoking can make heart problems worse. If you need help quitting, talk to your doctor about stop-smoking programs and medicines. These can increase your chances of quitting for good. When should you call for help? Call 911 anytime you think you may need emergency care. For example, call if: 
· You have severe trouble breathing. · You cough up pink, foamy mucus and you have trouble breathing. · You have symptoms of a heart attack. These may include: ¨ Chest pain or pressure, or a strange feeling in the chest. 
¨ Sweating. ¨ Shortness of breath. ¨ Nausea or vomiting. ¨ Pain, pressure, or a strange feeling in the back, neck, jaw, or upper belly or in one or both shoulders or arms. ¨ Lightheadedness or sudden weakness. ¨ A fast or irregular heartbeat. After you call 911, the  may tell you to chew 1 adult-strength or 2 to 4 low-dose aspirin. Wait for an ambulance. Do not try to drive yourself. · You have angina symptoms (such as chest pain or pressure) that do not go away with rest or are not getting better within 5 minutes after you take a dose of nitroglycerin. · You have symptoms of a stroke. These may include: 
¨ Sudden numbness, tingling, weakness, or loss of movement in your face, arm, or leg, especially on only one side of your body. ¨ Sudden vision changes. ¨ Sudden trouble speaking. ¨ Sudden confusion or trouble understanding simple statements. ¨ Sudden problems with walking or balance. ¨ A sudden, severe headache that is different from past headaches. · You passed out (lost consciousness). Call your doctor now or seek immediate medical care if: 
· You have new or increased shortness of breath. · You are dizzy or lightheaded, or you feel like you may faint. · You gain weight suddenly, such as 3 pounds or more in 2 to 3 days. (Your doctor may suggest a different range of weight gain.) · You have increased swelling in your legs, ankles, or feet. Watch closely for changes in your health, and be sure to contact your doctor if you have any problems. Where can you learn more? Go to http://mamta-dixon.info/. Enter D805 in the search box to learn more about \"Cardiac Rehabilitation: Care Instructions. \" Current as of: May 5, 2016 Content Version: 11.2 © 6721-0285 [employer]. Care instructions adapted under license by [employer] (which disclaims liability or warranty for this information). If you have questions about a medical condition or this instruction, always ask your healthcare professional. [address] any warranty or liability for your use of this information. Heart-Healthy Diet: Care Instructions Your Care Instructions A heart-healthy diet has lots of vegetables, fruits, nuts, beans, and whole grains, and is low in salt. It limits foods that are high in saturated fat, such as meats, cheeses, and fried foods. It may be hard to change your diet, but even small changes can lower your risk of heart attack and heart disease. Follow-up care is a key part of your treatment and safety. Be sure to make and go to all appointments, and call your doctor if you are having problems. It's also a good idea to know your test results and keep a list of the medicines you take. How can you care for yourself at home? Watch your portions · Learn what a serving is. A \"serving\" and a \"portion\" are not always the same thing. Make sure that you are not eating larger portions than are recommended. For example, a serving of pasta is ½ cup. A serving size of meat is 2 to 3 ounces. A 3-ounce serving is about the size of a deck of cards. Measure serving sizes until you are good at [place]" them. Keep in mind that restaurants often serve portions that are 2 or 3 times the size of one serving.  
· To keep your energy level up and keep you from feeling hungry, eat often but in smaller portions. · Eat only the number of calories you need to stay at a healthy weight. If you need to lose weight, eat fewer calories than your body burns (through exercise and other physical activity). Eat more fruits and vegetables · Eat a variety of fruit and vegetables every day. Dark green, deep orange, red, or yellow fruits and vegetables are especially good for you. Examples include spinach, carrots, peaches, and berries. · Keep carrots, celery, and other veggies handy for snacks. Buy fruit that is in season and store it where you can see it so that you will be tempted to eat it. · Cook dishes that have a lot of veggies in them, such as stir-fries and soups. Limit saturated and trans fat · Read food labels, and try to avoid saturated and trans fats. They increase your risk of heart disease. Trans fat is found in many processed foods such as cookies and crackers. · Use olive or canola oil when you cook. Try cholesterol-lowering spreads, such as Benecol or Take Control. · Bake, broil, grill, or steam foods instead of frying them. · Choose lean meats instead of high-fat meats such as hot dogs and sausages. Cut off all visible fat when you prepare meat. · Eat fish, skinless poultry, and meat alternatives such as soy products instead of high-fat meats. Soy products, such as tofu, may be especially good for your heart. · Choose low-fat or fat-free milk and dairy products. Eat fish · Eat at least two servings of fish a week. Certain fish, such as salmon and tuna, contain omega-3 fatty acids, which may help reduce your risk of heart attack. Eat foods high in fiber · Eat a variety of grain products every day. Include whole-grain foods that have lots of fiber and nutrients. Examples of whole-grain foods include oats, whole wheat bread, and brown rice. · Buy whole-grain breads and cereals, instead of white bread or pastries. Limit salt and sodium · Limit how much salt and sodium you eat to help lower your blood pressure. · Taste food before you salt it. Add only a little salt when you think you need it. With time, your taste buds will adjust to less salt. · Eat fewer snack items, fast foods, and other high-salt, processed foods. Check food labels for the amount of sodium in packaged foods. · Choose low-sodium versions of canned goods (such as soups, vegetables, and beans). Limit sugar · Limit drinks and foods with added sugar. These include candy, desserts, and soda pop. Limit alcohol · Limit alcohol to no more than 2 drinks a day for men and 1 drink a day for women. Too much alcohol can cause health problems. When should you call for help? Watch closely for changes in your health, and be sure to contact your doctor if: 
· You would like help planning heart-healthy meals. Where can you learn more? Go to http://mamtaStem Cell Therapeuticsdixon.info/. Enter V137 in the search box to learn more about \"Heart-Healthy Diet: Care Instructions. \" Current as of: January 27, 2016 Content Version: 11.2 © 8598-7299 [employer]. Care instructions adapted under license by [employer] (which disclaims liability or warranty for this information). If you have questions about a medical condition or this instruction, always ask your healthcare professional. [address] any warranty or liability for your use of this information. Reducing Heart Attack Risk With Daily Medicine: Care Instructions Your Care Instructions Heart disease is the number one cause of death. If you are at risk for heart disease, there are many medicines that can reduce your risk. These include: · ACE inhibitors. These are a type of blood pressure medicine. They can reduce the risk of heart attacks and strokes if you are at high risk. · Statin medicines. These lower cholesterol.  They can also reduce the risk of heart disease and strokes. · Aspirin. It can help certain people lower their risk of a heart attack or stroke. · Beta-blocker medicines. These are a type of blood pressure and heart medicine. They can reduce the chance of early death if you have had a heart attack. All medicines can cause side effects. So it is important to understand the pros and cons of any medicine you take. It is also important to take your medicines exactly as your doctor tells you to. Follow-up care is a key part of your treatment and safety. Be sure to make and go to all appointments, and call your doctor if you are having problems. It's also a good idea to know your test results and keep a list of the medicines you take. ACE inhibitors ACE (angiotensin-converting enzyme) inhibitors are used for three main reasons. They lower blood pressure, protect the kidneys, and prevent heart attacks and strokes. Examples include benazepril (Lotensin), lisinopril (Prinivil, Zestril), and ramipril (Altace). Before you start taking an ACE inhibitor, make sure your doctor knows if: 
· You are taking a water pill (diuretic). · You are taking potassium pills or using salt substitutes. · You are pregnant or breastfeeding. · You have had a kidney transplant or other kidney problems. ACE inhibitors can cause side effects. Call your doctor right away if you have: · Trouble breathing. · Swelling in your face, head, neck, or tongue. · Dizziness or lightheadedness. · A dry cough. Statins Statins lower cholesterol. Examples include atorvastatin (Lipitor), lovastatin (Mevacor), pravastatin (Pravachol), and simvastatin (Zocor). Before you start taking a statin, make sure your doctor knows if: 
· You have had a kidney transplant or other kidney problems. · You have liver disease. · You take any other prescription medicine, over-the-counter medicine, vitamins, supplements, or herbal remedies. · You are pregnant or breastfeeding. Statins can cause side effects. Call your doctor right away if you have: · New, severe muscle aches. · Brown urine. Aspirin Taking an aspirin every day can lower your risk for a heart attack. A heart attack occurs when a blood vessel in the heart gets blocked. When this happens, oxygen can't get to the heart muscle, and part of the heart dies. Aspirin can help prevent blood clots that can block the blood vessels. Talk to your doctor before you start taking aspirin every day. He or she may recommend that you take one low-dose aspirin (81 mg) tablet each day, with a meal and a full glass of water. Taking aspirin isn't right for everyone, because it can cause serious bleeding. And you may not be able to use aspirin if you: 
· Have asthma. · Have an ulcer or other stomach problem. · Take some other medicine (called a blood thinner) that prevents blood clots. · Are allergic to aspirin. Before having a surgery or procedure, tell your doctor or dentist that you take aspirin. He or she will tell you if you should stop taking aspirin beforehand. Make sure that you understand exactly what your doctor wants you to do. Aspirin can cause side effects. Call your doctor right away if you have: · Unusual bleeding or bruising. · Nausea, vomiting, or heartburn. · Black or bloody stools. Beta-blockers Beta-blockers are used for three main reasons. They lower blood pressure, relieve angina symptoms (such as chest pain or pressure), and reduce the chances of a second heart attack. They include atenolol (Tenormin), carvedilol (Coreg), and metoprolol (Lopressor). Before you start taking a beta-blocker, make sure your doctor knows if you have: · Severe asthma or frequent asthma attacks. · A very slow pulse (less than 55 beats a minute). Beta-blockers can cause side effects. Call your doctor right away if you have: · Wheezing or trouble breathing. · Dizziness or lightheadedness. · Asthma that gets worse. When should you call for help? Call 911 anytime you think you may need emergency care. For example, call if: 
· You passed out (lost consciousness). Call your doctor now or seek immediate medical care if: 
· You are wheezing or have trouble breathing. · You have swelling in your face, head, neck, or tongue. · You are dizzy or lightheaded, or you feel like you may faint. · You have severe muscle pain, weakness, or brown urine. · You have vision problems. · You have new bruises or blood spots under your skin. · Your stools are black and tarlike or have streaks of blood. Watch closely for changes in your health, and be sure to contact your doctor if: 
· You have ringing in your ears. · You feel very tired. · You have gas, constipation, or an upset stomach. Where can you learn more? Go to http://mamta-dixon.info/. Enter R428 in the search box to learn more about \"Reducing Heart Attack Risk With Daily Medicine: Care Instructions. \" Current as of: March 28, 2016 Content Version: 11.2 © 5107-8434 [employer]. Care instructions adapted under license by [employer] (which disclaims liability or warranty for this information). If you have questions about a medical condition or this instruction, always ask your healthcare professional. [place] [address] any warranty or liability for your use of this information. Secondhand Smoke: Care Instructions Your Care Instructions Secondhand smoke comes from the burning end of a cigarette, cigar, or pipe and the smoke that a smoker exhales. The smoke contains nicotine and many other harmful chemicals. Breathing secondhand smoke can cause or worsen health problems including cancer, asthma, coronary artery disease, and respiratory infections. It can make your eyes and nose burn and cause a sore throat.  
Secondhand smoke is especially bad for babies and young children whose lungs are still developing. Babies whose parents smoke are more likely to have ear infections, pneumonia, and bronchitis in the first few years of their lives. Secondhand smoke can make asthma symptoms worse in children. If you are pregnant, it is important that you not smoke and that you avoid secondhand smoke. You are more likely to give birth to a baby who weighs less than expected (low birth weight) if you smoke. And your baby may have a greater risk for sudden infant death syndrome (SIDS). Babies whose mothers are exposed to secondhand smoke during pregnancy have a higher risk for health problems. Follow-up care is a key part of your treatment and safety. Be sure to make and go to all appointments, and call your doctor if you are having problems. It's also a good idea to know your test results and keep a list of the medicines you take. How can you care for yourself at home? · Do not smoke or let anyone else smoke in your home. If people must smoke, ask them to go outside. · If people do smoke in your home, choose a room where you can open a window or use a fan to get the smoke outside. · Do not let anyone smoke in your car. If someone must smoke, pull over in a safe place and let him or her smoke away from the car. · Ask your employer to make sure that you have a smoke-free work area. · Make sure that your children are not exposed to secondhand smoke at day care, school, and after-school programs. · Try to choose nonsmoking bars, restaurants, and other public places when you go out. · Help your family and friends who smoke to quit by encouraging them to try. Tell them about treatment resources. Having support from others often helps. · If you smoke, quit. Quitting is hard, but there are ways to boost your chance of quitting tobacco for good. ¨ Use nicotine gum, patches, or lozenges. Call a quitline. Ask your doctor about stop-smoking programs and medicines. ¨ Keep trying. When should you call for help? Watch closely for changes in your health, and be sure to contact your doctor if you have any problems. Where can you learn more? Go to http://mamta-dixon.info/. Enter L004 in the search box to learn more about \"Secondhand Smoke: Care Instructions. \" Current as of: May 26, 2016 Content Version: 11.2 © 1365-8321 [employer]. Care instructions adapted under license by [employer] (which disclaims liability or warranty for this information). If you have questions about a medical condition or this instruction, always ask your healthcare professional. [address] any warranty or liability for your use of this information.

## 2017-06-14 NOTE — PROGRESS NOTES
TRANSFER - IN REPORT:    Verbal report received from Manuel Springer RN , RN on Anai Nicole  being received from [address] [place] [address] for routine progression of care. Report consisted of patients Situation, Background, Assessment and   Recommendations(SBAR). Information from the following reports was reviewed: Kardex, Procedure Summary, MAR and Recent Results. Opportunity for questions and clarification was provided. Assessment completed upon patients arrival to unit and care assumed. Patient received to room 308 and assessment completed. Patient connected to telemetry monitor and eagle with BP cycling every 15 minutes. Patient oriented to room and plan of care reviewed. Patient voiced understanding of keeping wrist immobilized. Right radial site benign, dressing dry and intact, no hematoma; R band in place. Patient provided with clear liquids. Patient aware to use call light to communicate needs. Instructed patient to not use arm for any pushing or pulling. Admission skin assessment completed with second RN and reveals the following: Discoloration of skin with bruising bilateral arms. No sacrum breakdown noted. Heels firm and not boggy.

## 2017-06-14 NOTE — ANESTHESIA PREPROCEDURE EVALUATION
Anesthetic History   No history of anesthetic complications            Review of Systems / Medical History  Patient summary reviewed, nursing notes reviewed and pertinent labs reviewed    Pulmonary  Within defined limits                 Neuro/Psych   Within defined limits           Cardiovascular    Hypertension: well controlled    Angina      CAD and hyperlipidemia    Exercise tolerance: <4 METS  Comments: Severe LM disease with preserved EF    CXR cardiomegaly    Carotid US pending   GI/Hepatic/Renal  Within defined limits              Endo/Other             Other Findings   Comments: BPH         Physical Exam    Airway  Mallampati: II  TM Distance: 4 - 6 cm  Neck ROM: decreased range of motion   Mouth opening: Normal     Cardiovascular  Regular rate and rhythm,  S1 and S2 normal,  no murmur, click, rub, or gallop  Rhythm: regular  Rate: normal         Dental    Dentition: Full upper dentures     Pulmonary  Breath sounds clear to auscultation               Abdominal  GI exam deferred       Other Findings            Anesthetic Plan    ASA: 4  Anesthesia type: general    Monitoring Plan: Arterial line, [place], AMANDA and BIS    Post procedure ventilation   Induction: Intravenous  Anesthetic plan and risks discussed with: Patient, Spouse and Family

## 2017-06-14 NOTE — PROCEDURES
[address] [address]       Name:  Liana Lindsey   MR#:  [id number]   :  1940   Account #:  [de-identified]   Date of Adm:  2017       DATE OF PROCEDURE: 2017    CLINICAL INFORMATION: The patient with progressively worsening   chest pain consistent with [place] class IV angina, referred   for catheterization. PROCEDURE DETAILS: After informed consent was obtained, a 6-  [place] sheath was placed in the right radial artery. Radial   cocktail was given by protocol. A 5-[place] Tiger catheter and   angled pigtail were used for diagnostic angiography. TR Band was   placed at the end of the procedure. Sedation was begun at 9;35 and concluded at 9:56. 2 mg of Versed   and 25 mcg of fentanyl were given by Wisam Vickers R.N. I   supervised conscious sedation. Vital signs were stable   throughout. FINDINGS:   The left main coronary artery is in a normal anatomic position. There is a 95% proximal narrowing before the bifurcation into   LAD and circumflex system. The LAD has a 70% proximal narrowing. The distal vessel and   diagonal branch are free of significant disease. The circumflex has a large mid vessel obtuse marginal branch. There is competitive filling of a distal circumflex in the AV   groove from large macrocollaterals from the right coronary   artery. The right coronary artery is a large dominant vessel in normal   anatomic position. There is diffuse narrowing up to 30%. The PDA   and posterolateral obtuse marginal branches are free of   significant disease and, as stated above, collaterals are   present to the circumflex. Left ventriculogram reveals normal LV systolic function,   ejection fraction is 60%. Left ventricular end-diastolic   pressure is 16 mmHg. Opening aortic pressure 104/50 with no   gradient across the aortic valve.     CONCLUSION: Severe critical left main stenosis with preserved   left ventricular systolic function. RECOMMENDATIONS: The patient will be admitted and considered for   bypass surgery.         Mary Ann Bull MD      [facility] / [facility]   D:  06/14/2017   10:11   T:  06/14/2017   10:23   Job #:  [id number]

## 2017-06-14 NOTE — PROGRESS NOTES
Pre-procedural prayer per patient's 's request    Lachelle Maurer III, PhD  Jacky Rushing  [phone]

## 2017-06-14 NOTE — PROCEDURES
Brief Cardiac Procedure Note    Patient: Obey Randhawa MRN: [id number]  SSN: [id number]    YOB: 1940  Age: 68 y.o. Sex: male      Date of Procedure: 6/14/2017     Pre-procedure Diagnosis: Chest pain CCS Class IV    Post-procedure Diagnosis: Coronary Artery Disease    Procedure: Left Heart Catheterization    Brief Description of Procedure: lhc via right radial, tr    Performed By: Ximena Martines MD     Assistants: none    Anesthesia: Moderate Sedation    Estimated Blood Loss: Less than 10 mL      Specimens: None    Implants: None    Findings: severe lm, nl lvef    Complications: None    Recommendations: Continue medical therapy.  Admit for cabg    Signed By: Ximena Martines MD     June 14, 2017

## 2017-06-14 NOTE — CONSULTS
[address] [facility]  Regina Frankme. MD Shabana Sibley. Magda Haggis, MD [address]. Cameron Maxwell, [address]       [id number]  6/14/2017 1940    REFERRING PHYSICIAN:  Jazlyn John    CHIEF COMPLAINT:  Unstable angina    HISTORY OF PRESENT ILLNESS:  The patient is a 68 y.o. male who is seen for evaluation of unstable angina/left main cad. Risk factors include hypertension, hyperlipidemia, prior carotid endarterectomy. Presented with exertional dyspnea over the past 1 year. Improves with rest. Does not have chest pain or congestive heart failure symptoms. He had a moderate risk nuclear study. Left heart cath was done today and shows critical left main coronary disease with preserved LVEF. He was admitted to the hospital and placed on heparin drip. No history of stroke, TIA, anesthetic complication. Had a pericardial window and R thoracotomy as a 3year-old. Does not know any other details. 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    Pure hypercholesterolemia 11/25/2013    Unspecified essential hypertension 11/25/2013    Unstable angina ([address] 6/14/2017       Past Surgical History:   Procedure Laterality Date    HX CAROTID ENDARTERECTOMY Right 2012    HX HERNIA REPAIR      HX OTHER SURGICAL      hydrocelectomy       Family History   Problem Relation Age of Onset    Cancer Neg Hx     Heart Disease Neg Hx     Hypertension Neg Hx     Kidney Disease Neg Hx        Social History     Social History    Marital status:      Spouse name: N/A    Number of children: N/A    Years of education: N/A     Occupational History    Not on file.      Social History Main Topics    Smoking status: Never Smoker    Smokeless tobacco: Never Used    Alcohol use No    Drug use: No    Sexual activity: Not on file     Other Topics Concern    Not on file     Social History Narrative       No Known Allergies    No current facility-administered medications on file prior to encounter. Current Outpatient Prescriptions on File Prior to Encounter   Medication Sig Dispense Refill    multivitamin (ONE A DAY) tablet Take 1 Tab by mouth daily.  simvastatin (ZOCOR) 40 mg tablet Take 40 mg by mouth nightly.  lisinopril-hydrochlorothiazide (PRINZIDE, ZESTORETIC) 20-12.5 mg per tablet Take  by mouth daily.  aspirin 81 mg chewable tablet Take 81 mg by mouth daily. REVIEW OF SYSTEMS:  GENERAL:  No weight loss. No fever. EYES:  No diplopia. No vision loss. ENTM:  No hearing loss. No trouble swallowing. No hoarseness. CARDIAC:  See present illness. PULMONARY:  Denies asthma or chronic pulmonary disease. GI:  No change in bowel habits. No bleeding. :  No dysuria. No history of renal stones or renal insufficiency. MS:  Denies osteoarthritis. NEURO:  No stroke or seizure disorder. HEME/LYMPHATIC:  No history of bleeding tendencies or DVT. PSYCHIATRIC:  No history of depression. PHYSICAL EXAMINATION:  GENERAL APPEARANCE:  Well developed. Well nourished. No distress. HEENT:  Normocephalic. Extraocular muscles are intact. No scleral icterus. NECK/LYMPHATIC:  Supple. No thyromegaly or adenopathy. CHEST WALL: No deformity. R thoracotomy and subxiphoid scars  LUNGS: Lungs are clear to auscultation. CARDIAC:  Heart is regular rate and rhythm without a murmur. No carotid bruits. No jugular venous distention. No peripheral edema. Normal peripheral pulses  VASCULAR:  Pulses are full and equal at the radial arteries and the popliteal arteries bilaterally. There is no venous stasis disease. ABDOMEN:  Soft and non-tender. No pulsatile masses.   SKIN:  No rashes, cyanosis, jaundice, ecchymoses or evidence of skin breakdown. EXTREMITIES:  Full range of motion. No deformity. NEURO:  Grossly intact. No focal deficit. PSYCHIATRIC: Alert, cooperative and oriented times three. Normal speech and affect. IMPRESSION:  Left main coronary artery disease with anginal equivalent    PLAN:  I have personally viewed the cardiac catheterization, labs. I discussed in detail with Mr. Evan Zapien , both the alternatives to, and risks and benefits of CABG and explained the deportment of the operation. Patient saw our teaching video. Risks include but are not limited to: bleeding, infection including mediastinitis, need for reoperation, renal failure, respiratory failure, myocardial infarction, stroke and death. Risk of death is 1-2%. All of his questions were answered and he agrees to proceed.      Plan for risk stratification and CABG tomorrow           SFD CATH 2 ALL EVENTS

## 2017-06-14 NOTE — PROGRESS NOTES
TRANSFER - OUT REPORT:    Verbal report given to [facility]) on [employer]  being transferred to room 308(unit) for routine progression of care       Report consisted of patients Situation, Background, Assessment and   Recommendations(SBAR). Information from the following report(s) Procedure Summary was reviewed with the receiving nurse. Lines:   Peripheral IV 06/14/17 Right Antecubital (Active)       Peripheral IV 06/14/17 Left Wrist (Active)        Opportunity for questions and clarification was provided.       Patient transported with:   Monitor  Registered Nurse

## 2017-06-14 NOTE — PROGRESS NOTES
Pt arrived, ambulated to room with no visible problems, planned LHC for Dr Gayatri Guerrero. Consent signed, Procedure discussed with pt all questions answered voiced understanding. Medications and history discussed with pt.     Pt prepped per orders

## 2017-06-15 ENCOUNTER — APPOINTMENT (OUTPATIENT)
Dept: GENERAL RADIOLOGY | Age: 77
DRG: 234 | End: 2017-06-15
Attending: THORACIC SURGERY (CARDIOTHORACIC VASCULAR SURGERY)
Payer: MEDICARE

## 2017-06-15 ENCOUNTER — ANESTHESIA (OUTPATIENT)
Dept: SURGERY | Age: 77
DRG: 234 | End: 2017-06-15
Payer: MEDICARE

## 2017-06-15 PROBLEM — Z99.11 ENCOUNTER FOR WEANING FROM VENTILATOR (HCC): Status: ACTIVE | Noted: 2017-06-15

## 2017-06-15 PROBLEM — Z95.1 S/P CABG X 2: Status: ACTIVE | Noted: 2017-06-15

## 2017-06-15 PROBLEM — R09.02 HYPOXIA: Status: ACTIVE | Noted: 2017-06-15

## 2017-06-15 PROBLEM — I25.10 CAD (CORONARY ARTERY DISEASE): Chronic | Status: ACTIVE | Noted: 2017-06-15

## 2017-06-15 LAB
ANION GAP BLD CALC-SCNC: 8 MMOL/L (ref 7–16)
APTT PPP: 31.3 SEC (ref 23.5–31.7)
APTT PPP: 44.3 SEC (ref 23.5–31.7)
ARTERIAL PATENCY WRIST A: ABNORMAL
ATRIAL RATE: 61 BPM
BASE DEFICIT BLD-SCNC: 1 MMOL/L
BASE DEFICIT BLD-SCNC: 2 MMOL/L
BASE DEFICIT BLDA-SCNC: 2.1 MMOL/L (ref 0–2)
BASE DEFICIT BLDA-SCNC: 3.5 MMOL/L (ref 0–2)
BASE DEFICIT BLDA-SCNC: 5.4 MMOL/L (ref 0–2)
BASE DEFICIT BLDA-SCNC: 6.1 MMOL/L (ref 0–2)
BASE EXCESS BLD CALC-SCNC: 0 MMOL/L
BASE EXCESS BLD CALC-SCNC: 1 MMOL/L
BASE EXCESS BLD CALC-SCNC: 1 MMOL/L
BDY SITE: ABNORMAL
BUN SERPL-MCNC: 13 MG/DL (ref 8–23)
CA-I BLD-MCNC: 1.03 MMOL/L (ref 1.12–1.32)
CA-I BLD-MCNC: 1.06 MMOL/L (ref 1.12–1.32)
CA-I BLD-MCNC: 1.1 MMOL/L (ref 1.12–1.32)
CA-I BLD-MCNC: 1.17 MMOL/L (ref 1.12–1.32)
CA-I BLD-MCNC: 1.22 MMOL/L (ref 1.12–1.32)
CA-I BLD-SCNC: 1.26 MMOL/L (ref 1–1.3)
CALCIUM SERPL-MCNC: 8.1 MG/DL (ref 8.3–10.4)
CALCULATED P AXIS, ECG09: 63 DEGREES
CALCULATED R AXIS, ECG10: 31 DEGREES
CALCULATED T AXIS, ECG11: 47 DEGREES
CHLORIDE BLDA-SCNC: 112 MMOL/L (ref 98–106)
CHLORIDE SERPL-SCNC: 114 MMOL/L (ref 98–107)
CO2 SERPL-SCNC: 23 MMOL/L (ref 21–32)
COHGB MFR BLD: 0.3 % (ref 0.5–1.5)
CREAT SERPL-MCNC: 1.18 MG/DL (ref 0.8–1.5)
DIAGNOSIS, 93000: NORMAL
DO-HGB BLD-MCNC: 2 % (ref 0–5)
DO-HGB BLD-MCNC: 4 % (ref 0–5)
DO-HGB BLD-MCNC: 4 % (ref 0–5)
DO-HGB BLD-MCNC: 6 % (ref 0–5)
ERYTHROCYTE [DISTWIDTH] IN BLOOD BY AUTOMATED COUNT: 14.2 % (ref 11.9–14.6)
FIBRINOGEN PPP-MCNC: 153 MG/DL (ref 172–437)
FIO2 ON VENT: 30 %
FIO2 ON VENT: 60 %
GLUCOSE BLD STRIP.AUTO-MCNC: 100 MG/DL (ref 65–100)
GLUCOSE BLD STRIP.AUTO-MCNC: 101 MG/DL (ref 65–100)
GLUCOSE BLD STRIP.AUTO-MCNC: 103 MG/DL (ref 65–100)
GLUCOSE BLD STRIP.AUTO-MCNC: 105 MG/DL (ref 65–100)
GLUCOSE BLD STRIP.AUTO-MCNC: 109 MG/DL (ref 65–100)
GLUCOSE BLD STRIP.AUTO-MCNC: 112 MG/DL (ref 65–100)
GLUCOSE BLD STRIP.AUTO-MCNC: 113 MG/DL (ref 70–105)
GLUCOSE BLD STRIP.AUTO-MCNC: 114 MG/DL (ref 70–105)
GLUCOSE BLD STRIP.AUTO-MCNC: 121 MG/DL (ref 70–105)
GLUCOSE BLD STRIP.AUTO-MCNC: 124 MG/DL (ref 65–100)
GLUCOSE BLD STRIP.AUTO-MCNC: 125 MG/DL (ref 70–105)
GLUCOSE BLD STRIP.AUTO-MCNC: 126 MG/DL (ref 70–105)
GLUCOSE BLD STRIP.AUTO-MCNC: 133 MG/DL (ref 65–100)
GLUCOSE BLD STRIP.AUTO-MCNC: 143 MG/DL (ref 65–100)
GLUCOSE BLD STRIP.AUTO-MCNC: 143 MG/DL (ref 65–100)
GLUCOSE BLD STRIP.AUTO-MCNC: 144 MG/DL (ref 65–100)
GLUCOSE BLD STRIP.AUTO-MCNC: 160 MG/DL (ref 65–100)
GLUCOSE SERPL-MCNC: 103 MG/DL (ref 65–100)
HCO3 BLD-SCNC: 23.6 MMOL/L (ref 22–26)
HCO3 BLD-SCNC: 24 MMOL/L (ref 22–26)
HCO3 BLD-SCNC: 25.5 MMOL/L (ref 22–26)
HCO3 BLD-SCNC: 26.4 MMOL/L (ref 22–26)
HCO3 BLD-SCNC: 26.4 MMOL/L (ref 22–26)
HCO3 BLDA-SCNC: 21 MMOL/L (ref 22–26)
HCO3 BLDA-SCNC: 24 MMOL/L (ref 22–26)
HCT VFR BLD AUTO: 27.1 % (ref 41.1–50.3)
HCT VFR BLD AUTO: 30 % (ref 41.1–50.3)
HCT VFR BLD AUTO: 30.6 % (ref 41.1–50.3)
HGB BLD-MCNC: 10.1 G/DL (ref 13.6–17.2)
HGB BLD-MCNC: 10.2 G/DL (ref 13.6–17.2)
HGB BLD-MCNC: 9 G/DL (ref 13.6–17.2)
HGB BLDMV-MCNC: 10.8 GM/DL (ref 11.7–15)
HGB BLDMV-MCNC: 11 GM/DL (ref 11.7–15)
HGB BLDMV-MCNC: 11.6 GM/DL (ref 11.7–15)
HGB BLDMV-MCNC: 11.7 GM/DL (ref 11.7–15)
INR PPP: 1.5 (ref 0.9–1.2)
MAGNESIUM SERPL-MCNC: 3.2 MG/DL (ref 1.8–2.4)
MAGNESIUM SERPL-MCNC: 3.2 MG/DL (ref 1.8–2.4)
MAGNESIUM SERPL-MCNC: 4 MG/DL (ref 1.8–2.4)
MCH RBC QN AUTO: 28.9 PG (ref 26.1–32.9)
MCHC RBC AUTO-ENTMCNC: 33.2 G/DL (ref 31.4–35)
MCV RBC AUTO: 87.1 FL (ref 79.6–97.8)
METHGB MFR BLD: 0.3 % (ref 0–1.5)
METHGB MFR BLD: 0.3 % (ref 0–1.5)
METHGB MFR BLD: 0.4 % (ref 0–1.5)
METHGB MFR BLD: 0.4 % (ref 0–1.5)
OXYHGB MFR BLDA: 93.8 % (ref 94–97)
OXYHGB MFR BLDA: 95.1 % (ref 94–97)
OXYHGB MFR BLDA: 95.4 % (ref 94–97)
OXYHGB MFR BLDA: 97.9 % (ref 94–97)
P-R INTERVAL, ECG05: 210 MS
PCO2 BLD: 41.5 MMHG (ref 35–45)
PCO2 BLD: 41.9 MMHG (ref 35–45)
PCO2 BLD: 45 MMHG (ref 35–45)
PCO2 BLD: 45.8 MMHG (ref 35–45)
PCO2 BLD: 49.5 MMHG (ref 35–45)
PCO2 BLDA: 34 MMHG (ref 35–45)
PCO2 BLDA: 44 MMHG (ref 35–45)
PCO2 BLDA: 48 MMHG (ref 35–45)
PCO2 BLDA: 48 MMHG (ref 35–45)
PEEP RESPIRATORY: 8 CM[H2O]
PH BLD: 7.33 [PH] (ref 7.35–7.45)
PH BLD: 7.33 [PH] (ref 7.35–7.45)
PH BLD: 7.37 [PH] (ref 7.35–7.45)
PH BLD: 7.37 [PH] (ref 7.35–7.45)
PH BLD: 7.39 [PH] (ref 7.35–7.45)
PH BLDA: 7.26 [PH] (ref 7.35–7.45)
PH BLDA: 7.29 [PH] (ref 7.35–7.45)
PH BLDA: 7.32 [PH] (ref 7.35–7.45)
PH BLDA: 7.4 [PH] (ref 7.35–7.45)
PLATELET # BLD AUTO: 156 K/UL (ref 150–450)
PMV BLD AUTO: 9.2 FL (ref 10.8–14.1)
PO2 BLD: 252 MMHG (ref 80–105)
PO2 BLD: 325 MMHG (ref 80–105)
PO2 BLD: 357 MMHG (ref 80–105)
PO2 BLD: 394 MMHG (ref 80–105)
PO2 BLD: 404 MMHG (ref 80–105)
PO2 BLDA: 176 MMHG (ref 75–100)
PO2 BLDA: 87 MMHG (ref 75–100)
PO2 BLDA: 95 MMHG (ref 75–100)
PO2 BLDA: 98 MMHG (ref 75–100)
POTASSIUM BLD-SCNC: 4.2 MMOL/L (ref 3.5–5.1)
POTASSIUM BLD-SCNC: 4.4 MMOL/L (ref 3.5–5.1)
POTASSIUM BLD-SCNC: 4.4 MMOL/L (ref 3.5–5.1)
POTASSIUM BLD-SCNC: 6 MMOL/L (ref 3.5–5.1)
POTASSIUM BLD-SCNC: 6 MMOL/L (ref 3.5–5.1)
POTASSIUM BLDA-SCNC: 4.63 MMOL/L (ref 3.5–5.3)
POTASSIUM SERPL-SCNC: 4.1 MMOL/L (ref 3.5–5.1)
POTASSIUM SERPL-SCNC: 4.6 MMOL/L (ref 3.5–5.1)
POTASSIUM SERPL-SCNC: 4.6 MMOL/L (ref 3.5–5.1)
PROTHROMBIN TIME: 16.1 SEC (ref 9.6–12)
Q-T INTERVAL, ECG07: 466 MS
QRS DURATION, ECG06: 78 MS
QTC CALCULATION (BEZET), ECG08: 469 MS
RBC # BLD AUTO: 3.11 M/UL (ref 4.23–5.67)
RESP RATE: 16
SAO2 % BLD: 100 % (ref 95–98)
SAO2 % BLD: 95 % (ref 92–98.5)
SAO2 % BLD: 96 % (ref 92–98.5)
SAO2 % BLD: 96 % (ref 92–98.5)
SAO2 % BLD: 99 % (ref 92–98.5)
SERVICE CMNT-IMP: ABNORMAL
SODIUM BLD-SCNC: 136 MMOL/L (ref 138–146)
SODIUM BLD-SCNC: 138 MMOL/L (ref 138–146)
SODIUM BLD-SCNC: 138 MMOL/L (ref 138–146)
SODIUM BLD-SCNC: 139 MMOL/L (ref 138–146)
SODIUM BLD-SCNC: 139 MMOL/L (ref 138–146)
SODIUM BLDA-SCNC: 138.9 MMOL/L (ref 135–148)
SODIUM SERPL-SCNC: 145 MMOL/L (ref 136–145)
VENTILATION MODE VENT: ABNORMAL
VENTRICULAR RATE, ECG03: 61 BPM
VT SETTING VENT: 500 ML
WBC # BLD AUTO: 9.5 K/UL (ref 4.3–11.1)

## 2017-06-15 PROCEDURE — 85730 THROMBOPLASTIN TIME PARTIAL: CPT | Performed by: INTERNAL MEDICINE

## 2017-06-15 PROCEDURE — 86580 TB INTRADERMAL TEST: CPT | Performed by: THORACIC SURGERY (CARDIOTHORACIC VASCULAR SURGERY)

## 2017-06-15 PROCEDURE — 80048 BASIC METABOLIC PNL TOTAL CA: CPT | Performed by: THORACIC SURGERY (CARDIOTHORACIC VASCULAR SURGERY)

## 2017-06-15 PROCEDURE — 77030002970 HC SUT PLEDG TELE -A: Performed by: THORACIC SURGERY (CARDIOTHORACIC VASCULAR SURGERY)

## 2017-06-15 PROCEDURE — 77030018571 HC SUT PROL1 J&J -B: Performed by: THORACIC SURGERY (CARDIOTHORACIC VASCULAR SURGERY)

## 2017-06-15 PROCEDURE — 74011000302 HC RX REV CODE- 302: Performed by: THORACIC SURGERY (CARDIOTHORACIC VASCULAR SURGERY)

## 2017-06-15 PROCEDURE — 76010000115 HC CV SURG 4.5 TO 5 HR: Performed by: THORACIC SURGERY (CARDIOTHORACIC VASCULAR SURGERY)

## 2017-06-15 PROCEDURE — 76060000041 HC ANESTHESIA 5 TO 5.5 HR: Performed by: THORACIC SURGERY (CARDIOTHORACIC VASCULAR SURGERY)

## 2017-06-15 PROCEDURE — 77030006247 HC LD PCMKR MYOCRD BPLR TEMP MEDT -B: Performed by: THORACIC SURGERY (CARDIOTHORACIC VASCULAR SURGERY)

## 2017-06-15 PROCEDURE — 76010000155 HC AUTO TRANSFUSION/CELL SAVER: Performed by: THORACIC SURGERY (CARDIOTHORACIC VASCULAR SURGERY)

## 2017-06-15 PROCEDURE — 36600 WITHDRAWAL OF ARTERIAL BLOOD: CPT

## 2017-06-15 PROCEDURE — 77030005424 HC CATH THOR GTNG -A: Performed by: THORACIC SURGERY (CARDIOTHORACIC VASCULAR SURGERY)

## 2017-06-15 PROCEDURE — 77030013861 HC PNCH AORT CLNCUT QUES -B: Performed by: THORACIC SURGERY (CARDIOTHORACIC VASCULAR SURGERY)

## 2017-06-15 PROCEDURE — 77030031139 HC SUT VCRL2 J&J -A: Performed by: THORACIC SURGERY (CARDIOTHORACIC VASCULAR SURGERY)

## 2017-06-15 PROCEDURE — 74011000250 HC RX REV CODE- 250

## 2017-06-15 PROCEDURE — 93005 ELECTROCARDIOGRAM TRACING: CPT | Performed by: THORACIC SURGERY (CARDIOTHORACIC VASCULAR SURGERY)

## 2017-06-15 PROCEDURE — 06BQ0ZZ EXCISION OF LEFT SAPHENOUS VEIN, OPEN APPROACH: ICD-10-PCS | Performed by: THORACIC SURGERY (CARDIOTHORACIC VASCULAR SURGERY)

## 2017-06-15 PROCEDURE — 77030010939 HC CLP LIG TELE -B: Performed by: THORACIC SURGERY (CARDIOTHORACIC VASCULAR SURGERY)

## 2017-06-15 PROCEDURE — 74011250636 HC RX REV CODE- 250/636

## 2017-06-15 PROCEDURE — 77030014007 HC SPNG HEMSTAT J&J -B: Performed by: THORACIC SURGERY (CARDIOTHORACIC VASCULAR SURGERY)

## 2017-06-15 PROCEDURE — 77030002912 HC SUT ETHBND J&J -A: Performed by: THORACIC SURGERY (CARDIOTHORACIC VASCULAR SURGERY)

## 2017-06-15 PROCEDURE — 77030002986 HC SUT PROL J&J -A: Performed by: THORACIC SURGERY (CARDIOTHORACIC VASCULAR SURGERY)

## 2017-06-15 PROCEDURE — 74011250636 HC RX REV CODE- 250/636: Performed by: THORACIC SURGERY (CARDIOTHORACIC VASCULAR SURGERY)

## 2017-06-15 PROCEDURE — 77030002996 HC SUT SLK J&J -A: Performed by: THORACIC SURGERY (CARDIOTHORACIC VASCULAR SURGERY)

## 2017-06-15 PROCEDURE — 77030010797: Performed by: THORACIC SURGERY (CARDIOTHORACIC VASCULAR SURGERY)

## 2017-06-15 PROCEDURE — C1751 CATH, INF, PER/CENT/MIDLINE: HCPCS | Performed by: NURSE ANESTHETIST, CERTIFIED REGISTERED

## 2017-06-15 PROCEDURE — 77030010514 HC APPL CLP LIG COVD -B: Performed by: THORACIC SURGERY (CARDIOTHORACIC VASCULAR SURGERY)

## 2017-06-15 PROCEDURE — 82962 GLUCOSE BLOOD TEST: CPT

## 2017-06-15 PROCEDURE — 74011000250 HC RX REV CODE- 250: Performed by: THORACIC SURGERY (CARDIOTHORACIC VASCULAR SURGERY)

## 2017-06-15 PROCEDURE — 77030019908 HC STETH ESOPH SIMS -A: Performed by: NURSE ANESTHETIST, CERTIFIED REGISTERED

## 2017-06-15 PROCEDURE — 65610000006 HC RM INTENSIVE CARE

## 2017-06-15 PROCEDURE — 77030005521 HC CATH URETH FOL38 BARD -B: Performed by: THORACIC SURGERY (CARDIOTHORACIC VASCULAR SURGERY)

## 2017-06-15 PROCEDURE — 77030016564 HC BLD STRNL SAW4 CNMD -B: Performed by: THORACIC SURGERY (CARDIOTHORACIC VASCULAR SURGERY)

## 2017-06-15 PROCEDURE — 74011250636 HC RX REV CODE- 250/636: Performed by: ANESTHESIOLOGY

## 2017-06-15 PROCEDURE — 77030005401 HC CATH RAD ARRO -A: Performed by: NURSE ANESTHETIST, CERTIFIED REGISTERED

## 2017-06-15 PROCEDURE — 74011000272 HC RX REV CODE- 272: Performed by: THORACIC SURGERY (CARDIOTHORACIC VASCULAR SURGERY)

## 2017-06-15 PROCEDURE — 77030013797 HC KT TRNSDUC PRSSR EDWD -A: Performed by: THORACIC SURGERY (CARDIOTHORACIC VASCULAR SURGERY)

## 2017-06-15 PROCEDURE — 77030009355 HC CRDPLG DEL SET QUES -C: Performed by: THORACIC SURGERY (CARDIOTHORACIC VASCULAR SURGERY)

## 2017-06-15 PROCEDURE — 85018 HEMOGLOBIN: CPT | Performed by: THORACIC SURGERY (CARDIOTHORACIC VASCULAR SURGERY)

## 2017-06-15 PROCEDURE — 77030022199 HC SYS HARV VESL GTNG -G1: Performed by: THORACIC SURGERY (CARDIOTHORACIC VASCULAR SURGERY)

## 2017-06-15 PROCEDURE — 85384 FIBRINOGEN ACTIVITY: CPT | Performed by: THORACIC SURGERY (CARDIOTHORACIC VASCULAR SURGERY)

## 2017-06-15 PROCEDURE — 83735 ASSAY OF MAGNESIUM: CPT | Performed by: THORACIC SURGERY (CARDIOTHORACIC VASCULAR SURGERY)

## 2017-06-15 PROCEDURE — 99223 1ST HOSP IP/OBS HIGH 75: CPT | Performed by: INTERNAL MEDICINE

## 2017-06-15 PROCEDURE — 77030020751 HC FLTR TBNG TRNSFUS HAEM -A: Performed by: NURSE ANESTHETIST, CERTIFIED REGISTERED

## 2017-06-15 PROCEDURE — 36415 COLL VENOUS BLD VENIPUNCTURE: CPT | Performed by: INTERNAL MEDICINE

## 2017-06-15 PROCEDURE — 82947 ASSAY GLUCOSE BLOOD QUANT: CPT

## 2017-06-15 PROCEDURE — 77030020407 HC IV BLD WRMR ST 3M -A: Performed by: NURSE ANESTHETIST, CERTIFIED REGISTERED

## 2017-06-15 PROCEDURE — 77030002520 HC INSRT CLMP LATIS STLTH AMR -B: Performed by: THORACIC SURGERY (CARDIOTHORACIC VASCULAR SURGERY)

## 2017-06-15 PROCEDURE — 77030012407 HC DRN WND BARD -B: Performed by: THORACIC SURGERY (CARDIOTHORACIC VASCULAR SURGERY)

## 2017-06-15 PROCEDURE — 77030011640 HC PAD GRND REM COVD -A: Performed by: THORACIC SURGERY (CARDIOTHORACIC VASCULAR SURGERY)

## 2017-06-15 PROCEDURE — P9045 ALBUMIN (HUMAN), 5%, 250 ML: HCPCS | Performed by: THORACIC SURGERY (CARDIOTHORACIC VASCULAR SURGERY)

## 2017-06-15 PROCEDURE — 77030002987 HC SUT PROL J&J -B: Performed by: THORACIC SURGERY (CARDIOTHORACIC VASCULAR SURGERY)

## 2017-06-15 PROCEDURE — 77030003010 HC SUT SURG STL J&J -B: Performed by: THORACIC SURGERY (CARDIOTHORACIC VASCULAR SURGERY)

## 2017-06-15 PROCEDURE — 77030020782 HC GWN BAIR PAWS FLX 3M -B: Performed by: NURSE ANESTHETIST, CERTIFIED REGISTERED

## 2017-06-15 PROCEDURE — P9047 ALBUMIN (HUMAN), 25%, 50ML: HCPCS

## 2017-06-15 PROCEDURE — C1769 GUIDE WIRE: HCPCS | Performed by: THORACIC SURGERY (CARDIOTHORACIC VASCULAR SURGERY)

## 2017-06-15 PROCEDURE — 77030008703 HC TU ET UNCUF COVD -A: Performed by: NURSE ANESTHETIST, CERTIFIED REGISTERED

## 2017-06-15 PROCEDURE — 77030002888 HC SUT CHRMC J&J -A: Performed by: THORACIC SURGERY (CARDIOTHORACIC VASCULAR SURGERY)

## 2017-06-15 PROCEDURE — 77030005537 HC CATH URETH BARD -A: Performed by: THORACIC SURGERY (CARDIOTHORACIC VASCULAR SURGERY)

## 2017-06-15 PROCEDURE — 77030010512 HC APPL CLP LIG J&J -C: Performed by: THORACIC SURGERY (CARDIOTHORACIC VASCULAR SURGERY)

## 2017-06-15 PROCEDURE — 85610 PROTHROMBIN TIME: CPT | Performed by: THORACIC SURGERY (CARDIOTHORACIC VASCULAR SURGERY)

## 2017-06-15 PROCEDURE — 74011250637 HC RX REV CODE- 250/637: Performed by: ANESTHESIOLOGY

## 2017-06-15 PROCEDURE — 82803 BLOOD GASES ANY COMBINATION: CPT

## 2017-06-15 PROCEDURE — 77030018729 HC ELECTRD DEFIB PAD CARD -B: Performed by: THORACIC SURGERY (CARDIOTHORACIC VASCULAR SURGERY)

## 2017-06-15 PROCEDURE — 77030019597 HC DRN CHST PLUER TELE -B: Performed by: THORACIC SURGERY (CARDIOTHORACIC VASCULAR SURGERY)

## 2017-06-15 PROCEDURE — 77030034927 HC PK PROC CPB INSPIRE PERF LIVA -F: Performed by: THORACIC SURGERY (CARDIOTHORACIC VASCULAR SURGERY)

## 2017-06-15 PROCEDURE — 80051 ELECTROLYTE PANEL: CPT

## 2017-06-15 PROCEDURE — 02100Z9 BYPASS CORONARY ARTERY, ONE ARTERY FROM LEFT INTERNAL MAMMARY, OPEN APPROACH: ICD-10-PCS | Performed by: THORACIC SURGERY (CARDIOTHORACIC VASCULAR SURGERY)

## 2017-06-15 PROCEDURE — 77030025646 HC AUTOTRNSFUS KT TERU -C: Performed by: THORACIC SURGERY (CARDIOTHORACIC VASCULAR SURGERY)

## 2017-06-15 PROCEDURE — 77030010813: Performed by: THORACIC SURGERY (CARDIOTHORACIC VASCULAR SURGERY)

## 2017-06-15 PROCEDURE — 77030016688: Performed by: THORACIC SURGERY (CARDIOTHORACIC VASCULAR SURGERY)

## 2017-06-15 PROCEDURE — 77030013292 HC BOWL MX PRSM J&J -A: Performed by: NURSE ANESTHETIST, CERTIFIED REGISTERED

## 2017-06-15 PROCEDURE — 74011000258 HC RX REV CODE- 258

## 2017-06-15 PROCEDURE — P9045 ALBUMIN (HUMAN), 5%, 250 ML: HCPCS

## 2017-06-15 PROCEDURE — 77030008477 HC STYL SATN SLP COVD -A: Performed by: NURSE ANESTHETIST, CERTIFIED REGISTERED

## 2017-06-15 PROCEDURE — 94002 VENT MGMT INPAT INIT DAY: CPT

## 2017-06-15 PROCEDURE — 74011250637 HC RX REV CODE- 250/637: Performed by: THORACIC SURGERY (CARDIOTHORACIC VASCULAR SURGERY)

## 2017-06-15 PROCEDURE — 77030003037 HC SUT WRE STRNOTMY AEMC -B: Performed by: THORACIC SURGERY (CARDIOTHORACIC VASCULAR SURGERY)

## 2017-06-15 PROCEDURE — 77030006689 HC BLD OPHTH BVR BD -A: Performed by: THORACIC SURGERY (CARDIOTHORACIC VASCULAR SURGERY)

## 2017-06-15 PROCEDURE — 71010 XR CHEST SNGL V: CPT

## 2017-06-15 PROCEDURE — 77030008522 HC TBNG INSUF LAPRO STRY -B: Performed by: THORACIC SURGERY (CARDIOTHORACIC VASCULAR SURGERY)

## 2017-06-15 PROCEDURE — 85730 THROMBOPLASTIN TIME PARTIAL: CPT | Performed by: THORACIC SURGERY (CARDIOTHORACIC VASCULAR SURGERY)

## 2017-06-15 PROCEDURE — 77030013794 HC KT TRNSDUC BLD EDWD -B: Performed by: NURSE ANESTHETIST, CERTIFIED REGISTERED

## 2017-06-15 PROCEDURE — 74011250637 HC RX REV CODE- 250/637: Performed by: INTERNAL MEDICINE

## 2017-06-15 PROCEDURE — 77030018836 HC SOL IRR NACL ICUM -A: Performed by: THORACIC SURGERY (CARDIOTHORACIC VASCULAR SURGERY)

## 2017-06-15 PROCEDURE — 77030018548 HC SUT ETHBND2 J&J -B: Performed by: THORACIC SURGERY (CARDIOTHORACIC VASCULAR SURGERY)

## 2017-06-15 PROCEDURE — 77030015758: Performed by: THORACIC SURGERY (CARDIOTHORACIC VASCULAR SURGERY)

## 2017-06-15 PROCEDURE — 85027 COMPLETE CBC AUTOMATED: CPT | Performed by: THORACIC SURGERY (CARDIOTHORACIC VASCULAR SURGERY)

## 2017-06-15 PROCEDURE — 77030034888 HC SUT PROL 2 J&J -B: Performed by: THORACIC SURGERY (CARDIOTHORACIC VASCULAR SURGERY)

## 2017-06-15 PROCEDURE — 77030008771 HC TU NG SALEM SUMP -A: Performed by: NURSE ANESTHETIST, CERTIFIED REGISTERED

## 2017-06-15 PROCEDURE — 021009W BYPASS CORONARY ARTERY, ONE ARTERY FROM AORTA WITH AUTOLOGOUS VENOUS TISSUE, OPEN APPROACH: ICD-10-PCS | Performed by: THORACIC SURGERY (CARDIOTHORACIC VASCULAR SURGERY)

## 2017-06-15 PROCEDURE — 84132 ASSAY OF SERUM POTASSIUM: CPT | Performed by: THORACIC SURGERY (CARDIOTHORACIC VASCULAR SURGERY)

## 2017-06-15 PROCEDURE — 5A1935Z RESPIRATORY VENTILATION, LESS THAN 24 CONSECUTIVE HOURS: ICD-10-PCS | Performed by: THORACIC SURGERY (CARDIOTHORACIC VASCULAR SURGERY)

## 2017-06-15 PROCEDURE — 77030010938 HC CLP LIG TELE -A: Performed by: THORACIC SURGERY (CARDIOTHORACIC VASCULAR SURGERY)

## 2017-06-15 RX ORDER — LIDOCAINE HYDROCHLORIDE 20 MG/ML
INJECTION, SOLUTION EPIDURAL; INFILTRATION; INTRACAUDAL; PERINEURAL AS NEEDED
Status: DISCONTINUED | OUTPATIENT
Start: 2017-06-15 | End: 2017-06-15 | Stop reason: HOSPADM

## 2017-06-15 RX ORDER — SODIUM CHLORIDE 0.9 % (FLUSH) 0.9 %
5-10 SYRINGE (ML) INJECTION AS NEEDED
Status: DISCONTINUED | OUTPATIENT
Start: 2017-06-15 | End: 2017-06-15 | Stop reason: HOSPADM

## 2017-06-15 RX ORDER — SODIUM CHLORIDE 0.9 % (FLUSH) 0.9 %
5-10 SYRINGE (ML) INJECTION EVERY 8 HOURS
Status: DISCONTINUED | OUTPATIENT
Start: 2017-06-15 | End: 2017-06-15 | Stop reason: HOSPADM

## 2017-06-15 RX ORDER — DOBUTAMINE HYDROCHLORIDE 200 MG/100ML
2-7 INJECTION INTRAVENOUS
Status: DISCONTINUED | OUTPATIENT
Start: 2017-06-15 | End: 2017-06-15 | Stop reason: SDUPTHER

## 2017-06-15 RX ORDER — SUFENTANIL CITRATE 50 UG/ML
INJECTION EPIDURAL; INTRAVENOUS AS NEEDED
Status: DISCONTINUED | OUTPATIENT
Start: 2017-06-15 | End: 2017-06-15 | Stop reason: HOSPADM

## 2017-06-15 RX ORDER — MIDAZOLAM HYDROCHLORIDE 1 MG/ML
1 INJECTION, SOLUTION INTRAMUSCULAR; INTRAVENOUS
Status: DISCONTINUED | OUTPATIENT
Start: 2017-06-15 | End: 2017-06-16

## 2017-06-15 RX ORDER — OXYCODONE AND ACETAMINOPHEN 10; 325 MG/1; MG/1
1 TABLET ORAL
Status: DISCONTINUED | OUTPATIENT
Start: 2017-06-15 | End: 2017-06-16

## 2017-06-15 RX ORDER — LIDOCAINE HYDROCHLORIDE 10 MG/ML
0.1 INJECTION INFILTRATION; PERINEURAL AS NEEDED
Status: DISCONTINUED | OUTPATIENT
Start: 2017-06-15 | End: 2017-06-15 | Stop reason: HOSPADM

## 2017-06-15 RX ORDER — ALBUMIN HUMAN 50 G/1000ML
25 SOLUTION INTRAVENOUS ONCE
Status: COMPLETED | OUTPATIENT
Start: 2017-06-15 | End: 2017-06-18

## 2017-06-15 RX ORDER — FAMOTIDINE 20 MG/1
20 TABLET, FILM COATED ORAL ONCE
Status: COMPLETED | OUTPATIENT
Start: 2017-06-15 | End: 2017-06-15

## 2017-06-15 RX ORDER — NITROGLYCERIN 20 MG/100ML
INJECTION INTRAVENOUS
Status: DISCONTINUED | OUTPATIENT
Start: 2017-06-15 | End: 2017-06-15 | Stop reason: HOSPADM

## 2017-06-15 RX ORDER — CEFAZOLIN SODIUM IN 0.9 % NACL 2 G/50 ML
2 INTRAVENOUS SOLUTION, PIGGYBACK (ML) INTRAVENOUS EVERY 8 HOURS
Status: COMPLETED | OUTPATIENT
Start: 2017-06-15 | End: 2017-06-18

## 2017-06-15 RX ORDER — METOPROLOL TARTRATE 25 MG/1
12.5 TABLET, FILM COATED ORAL 2 TIMES DAILY
Status: DISCONTINUED | OUTPATIENT
Start: 2017-06-16 | End: 2017-06-16

## 2017-06-15 RX ORDER — PAPAVERINE HYDROCHLORIDE 30 MG/ML
INJECTION INTRAMUSCULAR; INTRAVENOUS AS NEEDED
Status: DISCONTINUED | OUTPATIENT
Start: 2017-06-15 | End: 2017-06-16

## 2017-06-15 RX ORDER — NITROGLYCERIN 20 MG/100ML
10-100 INJECTION INTRAVENOUS
Status: DISCONTINUED | OUTPATIENT
Start: 2017-06-15 | End: 2017-06-16

## 2017-06-15 RX ORDER — MAGNESIUM SULFATE 1 G/100ML
1 INJECTION INTRAVENOUS AS NEEDED
Status: DISCONTINUED | OUTPATIENT
Start: 2017-06-15 | End: 2017-06-16

## 2017-06-15 RX ORDER — SODIUM CHLORIDE 0.9 % (FLUSH) 0.9 %
5-10 SYRINGE (ML) INJECTION EVERY 8 HOURS
Status: DISCONTINUED | OUTPATIENT
Start: 2017-06-15 | End: 2017-06-16

## 2017-06-15 RX ORDER — ONDANSETRON 2 MG/ML
4 INJECTION INTRAMUSCULAR; INTRAVENOUS
Status: DISCONTINUED | OUTPATIENT
Start: 2017-06-15 | End: 2017-06-16

## 2017-06-15 RX ORDER — MIDAZOLAM HYDROCHLORIDE 1 MG/ML
2 INJECTION, SOLUTION INTRAMUSCULAR; INTRAVENOUS ONCE
Status: DISCONTINUED | OUTPATIENT
Start: 2017-06-15 | End: 2017-06-15 | Stop reason: HOSPADM

## 2017-06-15 RX ORDER — ATORVASTATIN CALCIUM 40 MG/1
40 TABLET, FILM COATED ORAL EVERY EVENING
Status: DISCONTINUED | OUTPATIENT
Start: 2017-06-15 | End: 2017-06-19 | Stop reason: HOSPADM

## 2017-06-15 RX ORDER — ALBUTEROL SULFATE 0.83 MG/ML
1.25 SOLUTION RESPIRATORY (INHALATION)
Status: DISCONTINUED | OUTPATIENT
Start: 2017-06-15 | End: 2017-06-15

## 2017-06-15 RX ORDER — GUAIFENESIN 100 MG/5ML
81 LIQUID (ML) ORAL DAILY
Status: DISCONTINUED | OUTPATIENT
Start: 2017-06-16 | End: 2017-06-16

## 2017-06-15 RX ORDER — VANCOMYCIN HYDROCHLORIDE 1 G/20ML
INJECTION, POWDER, LYOPHILIZED, FOR SOLUTION INTRAVENOUS AS NEEDED
Status: DISCONTINUED | OUTPATIENT
Start: 2017-06-15 | End: 2017-06-16

## 2017-06-15 RX ORDER — MORPHINE SULFATE 10 MG/ML
3-5 INJECTION, SOLUTION INTRAMUSCULAR; INTRAVENOUS
Status: DISCONTINUED | OUTPATIENT
Start: 2017-06-15 | End: 2017-06-16

## 2017-06-15 RX ORDER — ATROPA BELLADONNA AND OPIUM 16.2; 6 MG/1; MG/1
1 SUPPOSITORY RECTAL
Status: DISCONTINUED | OUTPATIENT
Start: 2017-06-15 | End: 2017-06-16

## 2017-06-15 RX ORDER — TRAMADOL HYDROCHLORIDE 50 MG/1
50 TABLET ORAL
Status: DISCONTINUED | OUTPATIENT
Start: 2017-06-15 | End: 2017-06-19 | Stop reason: HOSPADM

## 2017-06-15 RX ORDER — PROTAMINE SULFATE 10 MG/ML
INJECTION, SOLUTION INTRAVENOUS AS NEEDED
Status: DISCONTINUED | OUTPATIENT
Start: 2017-06-15 | End: 2017-06-15 | Stop reason: HOSPADM

## 2017-06-15 RX ORDER — DEXTROSE, SODIUM CHLORIDE, AND POTASSIUM CHLORIDE 5; .45; .15 G/100ML; G/100ML; G/100ML
50 INJECTION INTRAVENOUS CONTINUOUS
Status: DISCONTINUED | OUTPATIENT
Start: 2017-06-15 | End: 2017-06-16

## 2017-06-15 RX ORDER — FENTANYL CITRATE 50 UG/ML
25 INJECTION, SOLUTION INTRAMUSCULAR; INTRAVENOUS ONCE
Status: DISCONTINUED | OUTPATIENT
Start: 2017-06-15 | End: 2017-06-15 | Stop reason: HOSPADM

## 2017-06-15 RX ORDER — ALBUMIN HUMAN 50 G/1000ML
SOLUTION INTRAVENOUS
Status: DISCONTINUED | OUTPATIENT
Start: 2017-06-15 | End: 2017-06-16

## 2017-06-15 RX ORDER — SODIUM CHLORIDE, SODIUM LACTATE, POTASSIUM CHLORIDE, CALCIUM CHLORIDE 600; 310; 30; 20 MG/100ML; MG/100ML; MG/100ML; MG/100ML
100 INJECTION, SOLUTION INTRAVENOUS CONTINUOUS
Status: DISCONTINUED | OUTPATIENT
Start: 2017-06-15 | End: 2017-06-15 | Stop reason: HOSPADM

## 2017-06-15 RX ORDER — NICARDIPINE HYDROCHLORIDE 0.1 MG/ML
5-15 INJECTION INTRAVENOUS
Status: DISCONTINUED | OUTPATIENT
Start: 2017-06-15 | End: 2017-06-15 | Stop reason: SDUPTHER

## 2017-06-15 RX ORDER — HEPARIN SODIUM 1000 [USP'U]/ML
INJECTION, SOLUTION INTRAVENOUS; SUBCUTANEOUS AS NEEDED
Status: DISCONTINUED | OUTPATIENT
Start: 2017-06-15 | End: 2017-06-15 | Stop reason: HOSPADM

## 2017-06-15 RX ORDER — DOBUTAMINE HYDROCHLORIDE 200 MG/100ML
2.5-1 INJECTION INTRAVENOUS
Status: DISCONTINUED | OUTPATIENT
Start: 2017-06-15 | End: 2017-06-16

## 2017-06-15 RX ORDER — AMIODARONE HYDROCHLORIDE 200 MG/1
400 TABLET ORAL EVERY 12 HOURS
Status: DISCONTINUED | OUTPATIENT
Start: 2017-06-15 | End: 2017-06-18

## 2017-06-15 RX ORDER — VECURONIUM BROMIDE FOR INJECTION 1 MG/ML
INJECTION, POWDER, LYOPHILIZED, FOR SOLUTION INTRAVENOUS AS NEEDED
Status: DISCONTINUED | OUTPATIENT
Start: 2017-06-15 | End: 2017-06-15 | Stop reason: HOSPADM

## 2017-06-15 RX ORDER — ALBUMIN HUMAN 50 G/1000ML
25 SOLUTION INTRAVENOUS AS NEEDED
Status: COMPLETED | OUTPATIENT
Start: 2017-06-15 | End: 2017-06-18

## 2017-06-15 RX ORDER — SODIUM CHLORIDE 9 MG/ML
INJECTION, SOLUTION INTRAVENOUS
Status: DISCONTINUED | OUTPATIENT
Start: 2017-06-15 | End: 2017-06-15 | Stop reason: HOSPADM

## 2017-06-15 RX ORDER — ETOMIDATE 2 MG/ML
INJECTION INTRAVENOUS AS NEEDED
Status: DISCONTINUED | OUTPATIENT
Start: 2017-06-15 | End: 2017-06-15 | Stop reason: HOSPADM

## 2017-06-15 RX ORDER — ALBUTEROL SULFATE 90 UG/1
2 AEROSOL, METERED RESPIRATORY (INHALATION)
Status: DISCONTINUED | OUTPATIENT
Start: 2017-06-15 | End: 2017-06-15

## 2017-06-15 RX ORDER — ALBUMIN HUMAN 50 G/1000ML
SOLUTION INTRAVENOUS
Status: COMPLETED
Start: 2017-06-15 | End: 2017-06-15

## 2017-06-15 RX ORDER — CHLORHEXIDINE GLUCONATE 1.2 MG/ML
10 RINSE ORAL 2 TIMES DAILY
Status: DISCONTINUED | OUTPATIENT
Start: 2017-06-15 | End: 2017-06-16

## 2017-06-15 RX ORDER — MUPIROCIN 20 MG/G
OINTMENT TOPICAL 2 TIMES DAILY
Status: DISCONTINUED | OUTPATIENT
Start: 2017-06-15 | End: 2017-06-15

## 2017-06-15 RX ORDER — SODIUM CHLORIDE, SODIUM LACTATE, POTASSIUM CHLORIDE, CALCIUM CHLORIDE 600; 310; 30; 20 MG/100ML; MG/100ML; MG/100ML; MG/100ML
INJECTION, SOLUTION INTRAVENOUS
Status: DISCONTINUED | OUTPATIENT
Start: 2017-06-15 | End: 2017-06-15 | Stop reason: HOSPADM

## 2017-06-15 RX ORDER — SODIUM BICARBONATE 1 MEQ/ML
50 SYRINGE (ML) INTRAVENOUS AS NEEDED
Status: DISCONTINUED | OUTPATIENT
Start: 2017-06-15 | End: 2017-06-16

## 2017-06-15 RX ORDER — MIDAZOLAM HYDROCHLORIDE 1 MG/ML
INJECTION, SOLUTION INTRAMUSCULAR; INTRAVENOUS AS NEEDED
Status: DISCONTINUED | OUTPATIENT
Start: 2017-06-15 | End: 2017-06-15 | Stop reason: HOSPADM

## 2017-06-15 RX ORDER — NALOXONE HYDROCHLORIDE 0.4 MG/ML
0.4 INJECTION, SOLUTION INTRAMUSCULAR; INTRAVENOUS; SUBCUTANEOUS AS NEEDED
Status: DISCONTINUED | OUTPATIENT
Start: 2017-06-15 | End: 2017-06-16

## 2017-06-15 RX ORDER — ROCURONIUM BROMIDE 10 MG/ML
INJECTION, SOLUTION INTRAVENOUS AS NEEDED
Status: DISCONTINUED | OUTPATIENT
Start: 2017-06-15 | End: 2017-06-15 | Stop reason: HOSPADM

## 2017-06-15 RX ORDER — MIDAZOLAM HYDROCHLORIDE 1 MG/ML
2 INJECTION, SOLUTION INTRAMUSCULAR; INTRAVENOUS
Status: DISCONTINUED | OUTPATIENT
Start: 2017-06-15 | End: 2017-06-15 | Stop reason: HOSPADM

## 2017-06-15 RX ORDER — POTASSIUM CHLORIDE 14.9 MG/ML
10 INJECTION INTRAVENOUS AS NEEDED
Status: ACTIVE | OUTPATIENT
Start: 2017-06-15 | End: 2017-06-16

## 2017-06-15 RX ORDER — LIDOCAINE AND PRILOCAINE 25; 25 MG/G; MG/G
CREAM TOPICAL ONCE
Status: DISCONTINUED | OUTPATIENT
Start: 2017-06-15 | End: 2017-06-15 | Stop reason: HOSPADM

## 2017-06-15 RX ORDER — CEFAZOLIN SODIUM IN 0.9 % NACL 2 G/50 ML
2 INTRAVENOUS SOLUTION, PIGGYBACK (ML) INTRAVENOUS ONCE
Status: COMPLETED | OUTPATIENT
Start: 2017-06-15 | End: 2017-06-15

## 2017-06-15 RX ORDER — MUPIROCIN 20 MG/G
OINTMENT TOPICAL 2 TIMES DAILY
Status: DISCONTINUED | OUTPATIENT
Start: 2017-06-15 | End: 2017-06-16

## 2017-06-15 RX ORDER — SODIUM CHLORIDE 9 MG/ML
50 INJECTION, SOLUTION INTRAVENOUS CONTINUOUS
Status: DISCONTINUED | OUTPATIENT
Start: 2017-06-15 | End: 2017-06-15 | Stop reason: HOSPADM

## 2017-06-15 RX ORDER — PROPOFOL 10 MG/ML
5-50 VIAL (ML) INTRAVENOUS CONTINUOUS
Status: DISCONTINUED | OUTPATIENT
Start: 2017-06-15 | End: 2017-06-16

## 2017-06-15 RX ORDER — LIDOCAINE HCL/PF 100 MG/5ML
50-100 SYRINGE (ML) INTRAVENOUS
Status: DISCONTINUED | OUTPATIENT
Start: 2017-06-15 | End: 2017-06-16

## 2017-06-15 RX ORDER — SODIUM CHLORIDE 0.9 % (FLUSH) 0.9 %
5-10 SYRINGE (ML) INJECTION AS NEEDED
Status: DISCONTINUED | OUTPATIENT
Start: 2017-06-15 | End: 2017-06-16

## 2017-06-15 RX ORDER — DEXTROSE 50 % IN WATER (D50W) INTRAVENOUS SYRINGE
25 AS NEEDED
Status: DISCONTINUED | OUTPATIENT
Start: 2017-06-15 | End: 2017-06-16

## 2017-06-15 RX ADMIN — VECURONIUM BROMIDE FOR INJECTION 3 MG: 1 INJECTION, POWDER, LYOPHILIZED, FOR SOLUTION INTRAVENOUS at 08:43

## 2017-06-15 RX ADMIN — CARVEDILOL 3.12 MG: 3.12 TABLET, FILM COATED ORAL at 05:06

## 2017-06-15 RX ADMIN — ASPIRIN 81 MG 81 MG: 81 TABLET ORAL at 05:06

## 2017-06-15 RX ADMIN — VECURONIUM BROMIDE FOR INJECTION 1 MG: 1 INJECTION, POWDER, LYOPHILIZED, FOR SOLUTION INTRAVENOUS at 10:01

## 2017-06-15 RX ADMIN — SODIUM CHLORIDE: 9 INJECTION, SOLUTION INTRAVENOUS at 07:37

## 2017-06-15 RX ADMIN — AMIODARONE HYDROCHLORIDE 400 MG: 200 TABLET ORAL at 05:05

## 2017-06-15 RX ADMIN — PROTAMINE SULFATE 225 MG: 10 INJECTION, SOLUTION INTRAVENOUS at 11:04

## 2017-06-15 RX ADMIN — DOBUTAMINE HYDROCHLORIDE 2.5 MCG/KG/MIN: 200 INJECTION INTRAVENOUS at 20:34

## 2017-06-15 RX ADMIN — ATROPA BELLADONNA AND OPIUM 1 SUPPOSITORY: 16.2; 6 SUPPOSITORY RECTAL at 19:56

## 2017-06-15 RX ADMIN — MIDAZOLAM HYDROCHLORIDE 1 MG: 1 INJECTION, SOLUTION INTRAMUSCULAR; INTRAVENOUS at 07:07

## 2017-06-15 RX ADMIN — OXYCODONE HYDROCHLORIDE AND ACETAMINOPHEN 1 TABLET: 10; 325 TABLET ORAL at 20:54

## 2017-06-15 RX ADMIN — SUFENTANIL CITRATE 25 MCG: 50 INJECTION EPIDURAL; INTRAVENOUS at 08:21

## 2017-06-15 RX ADMIN — AMIODARONE HYDROCHLORIDE 400 MG: 200 TABLET ORAL at 23:06

## 2017-06-15 RX ADMIN — SUFENTANIL CITRATE 75 MCG: 50 INJECTION EPIDURAL; INTRAVENOUS at 07:42

## 2017-06-15 RX ADMIN — CHLORHEXIDINE GLUCONATE 10 ML: 1.2 RINSE ORAL at 13:15

## 2017-06-15 RX ADMIN — SUFENTANIL CITRATE 50 MCG: 50 INJECTION EPIDURAL; INTRAVENOUS at 08:09

## 2017-06-15 RX ADMIN — SODIUM CHLORIDE, SODIUM LACTATE, POTASSIUM CHLORIDE, AND CALCIUM CHLORIDE 100 ML/HR: 600; 310; 30; 20 INJECTION, SOLUTION INTRAVENOUS at 05:50

## 2017-06-15 RX ADMIN — SODIUM BICARBONATE 50 MEQ: 84 INJECTION, SOLUTION INTRAVENOUS at 17:57

## 2017-06-15 RX ADMIN — SODIUM CHLORIDE: 9 INJECTION, SOLUTION INTRAVENOUS at 11:23

## 2017-06-15 RX ADMIN — MIDAZOLAM HYDROCHLORIDE 3 MG: 1 INJECTION, SOLUTION INTRAMUSCULAR; INTRAVENOUS at 10:37

## 2017-06-15 RX ADMIN — MIDAZOLAM HYDROCHLORIDE 2 MG: 1 INJECTION, SOLUTION INTRAMUSCULAR; INTRAVENOUS at 11:15

## 2017-06-15 RX ADMIN — HEPARIN SODIUM 35000 UNITS: 1000 INJECTION, SOLUTION INTRAVENOUS; SUBCUTANEOUS at 09:21

## 2017-06-15 RX ADMIN — MUPIROCIN: 20 OINTMENT TOPICAL at 20:54

## 2017-06-15 RX ADMIN — CEFAZOLIN 2 G: 1 INJECTION, POWDER, FOR SOLUTION INTRAMUSCULAR; INTRAVENOUS; PARENTERAL at 11:00

## 2017-06-15 RX ADMIN — ALBUMIN (HUMAN) 25 G: 12.5 INJECTION, SOLUTION INTRAVENOUS at 19:59

## 2017-06-15 RX ADMIN — ROCURONIUM BROMIDE 50 MG: 10 INJECTION, SOLUTION INTRAVENOUS at 07:42

## 2017-06-15 RX ADMIN — Medication 10 ML: at 20:59

## 2017-06-15 RX ADMIN — Medication 10 ML: at 05:08

## 2017-06-15 RX ADMIN — TUBERCULIN PURIFIED PROTEIN DERIVATIVE 5 UNITS: 5 INJECTION INTRADERMAL at 21:09

## 2017-06-15 RX ADMIN — DEXTROSE MONOHYDRATE, SODIUM CHLORIDE, AND POTASSIUM CHLORIDE 50 ML/HR: 50; 4.5; 1.49 INJECTION, SOLUTION INTRAVENOUS at 13:15

## 2017-06-15 RX ADMIN — MUPIROCIN: 20 OINTMENT TOPICAL at 06:13

## 2017-06-15 RX ADMIN — CEFAZOLIN 2 G: 1 INJECTION, POWDER, FOR SOLUTION INTRAMUSCULAR; INTRAVENOUS; PARENTERAL at 17:02

## 2017-06-15 RX ADMIN — ATORVASTATIN CALCIUM 40 MG: 40 TABLET, FILM COATED ORAL at 20:54

## 2017-06-15 RX ADMIN — MIDAZOLAM HYDROCHLORIDE 2 MG: 1 INJECTION, SOLUTION INTRAMUSCULAR; INTRAVENOUS at 07:02

## 2017-06-15 RX ADMIN — ALBUMIN HUMAN 25 G: 50 SOLUTION INTRAVENOUS at 14:50

## 2017-06-15 RX ADMIN — ALBUMIN (HUMAN) 25 G: 12.5 INJECTION, SOLUTION INTRAVENOUS at 13:22

## 2017-06-15 RX ADMIN — FAMOTIDINE 20 MG: 20 TABLET, FILM COATED ORAL at 06:16

## 2017-06-15 RX ADMIN — CEFAZOLIN 2 G: 1 INJECTION, POWDER, FOR SOLUTION INTRAMUSCULAR; INTRAVENOUS; PARENTERAL at 08:00

## 2017-06-15 RX ADMIN — ETOMIDATE 16 MG: 2 INJECTION INTRAVENOUS at 07:42

## 2017-06-15 RX ADMIN — ROCURONIUM BROMIDE 30 MG: 10 INJECTION, SOLUTION INTRAVENOUS at 10:37

## 2017-06-15 RX ADMIN — SUFENTANIL CITRATE 25 MCG: 50 INJECTION EPIDURAL; INTRAVENOUS at 08:48

## 2017-06-15 RX ADMIN — SODIUM CHLORIDE, SODIUM LACTATE, POTASSIUM CHLORIDE, CALCIUM CHLORIDE: 600; 310; 30; 20 INJECTION, SOLUTION INTRAVENOUS at 07:02

## 2017-06-15 RX ADMIN — LIDOCAINE HYDROCHLORIDE 80 MG: 20 INJECTION, SOLUTION EPIDURAL; INFILTRATION; INTRACAUDAL; PERINEURAL at 07:42

## 2017-06-15 RX ADMIN — SUFENTANIL CITRATE 25 MCG: 50 INJECTION EPIDURAL; INTRAVENOUS at 08:52

## 2017-06-15 RX ADMIN — MIDAZOLAM HYDROCHLORIDE 2 MG: 1 INJECTION, SOLUTION INTRAMUSCULAR; INTRAVENOUS at 07:42

## 2017-06-15 RX ADMIN — ALBUMIN (HUMAN) 25 G: 12.5 INJECTION, SOLUTION INTRAVENOUS at 14:50

## 2017-06-15 RX ADMIN — Medication 10 ML: at 13:18

## 2017-06-15 RX ADMIN — MIDAZOLAM HYDROCHLORIDE 2 MG: 1 INJECTION, SOLUTION INTRAMUSCULAR; INTRAVENOUS at 06:41

## 2017-06-15 RX ADMIN — NITROGLYCERIN 10 MCG/MIN: 20 INJECTION INTRAVENOUS at 07:45

## 2017-06-15 RX ADMIN — SUFENTANIL CITRATE 50 MCG: 50 INJECTION EPIDURAL; INTRAVENOUS at 09:46

## 2017-06-15 NOTE — PERIOP NOTES
TRANSFER - IN REPORT:    Verbal report received from [facility] on [facility]  being received from Room 308 for routine progression of care      Report consisted of patients Situation, Background, Assessment and   Recommendations(SBAR). Information from the following report(s) SBAR, Kardex and MAR was reviewed with the receiving nurse. Opportunity for questions and clarification was provided. Assessment completed upon patients arrival to unit and care assumed.

## 2017-06-15 NOTE — PERIOP NOTES
TRANSFER - OUT REPORT:    Verbal report given to David Ceballos on Taylor Munoz  being transferred to [facility] for routine progression of care       Report consisted of patients Situation, Background, Assessment and   Recommendations(SBAR). Information from the following report(s) OR Summary was reviewed with the receiving nurse. Lines:   Double Lumen 06/15/17 Right Internal jugular (Active)       Makenzie Sera Dual 06/15/17 Right Neck (Active)       Peripheral IV 06/14/17 Right Antecubital (Active)   Site Assessment Clean, dry, & intact 6/15/2017  4:19 AM   Phlebitis Assessment 0 6/15/2017  4:19 AM   Infiltration Assessment 0 6/15/2017  4:19 AM   Dressing Status Clean, dry, & intact 6/15/2017  4:19 AM   Dressing Type Tape;Transparent 6/15/2017  4:19 AM   Hub Color/Line Status Patent; Infusing 6/15/2017  4:19 AM   Alcohol Cap Used No 6/15/2017  4:19 AM       Peripheral IV 06/14/17 Left Wrist (Active)   Site Assessment Clean, dry, & intact 6/15/2017  4:19 AM   Phlebitis Assessment 0 6/15/2017  4:19 AM   Infiltration Assessment 0 6/15/2017  4:19 AM   Dressing Status Clean, dry, & intact 6/15/2017  4:19 AM   Dressing Type Transparent 6/15/2017  4:19 AM   Hub Color/Line Status Patent 6/15/2017  4:19 AM   Alcohol Cap Used No 6/15/2017  4:19 AM       Arterial Line 06/15/17 Left Radial artery (Active)        Opportunity for questions and clarification was provided.       Patient transported with:   Monitor  O2 @ 15 liters  Patient-specific medications from Pharmacy  Registered Nurse

## 2017-06-15 NOTE — PROGRESS NOTES
Dual skin assessment performed. No redness or skin breakdown noted. Alleyvn in place. Will continue to monitor.

## 2017-06-15 NOTE — PERIOP NOTES
Updated patient family at 8:34 AM  Updated patient family at 10:31 AM  Updated patient family at 11:29 AM    Four digit code obtained

## 2017-06-15 NOTE — PROGRESS NOTES
Cardiac rehab: chart reviewed, appropriate for outpatient cardiac rehab. Patient qualifies for cardiac rehab with: CABG. Pt is currently in OR for CABG procedure and will not be seen today. Pt will be contacted following discharge to schedule orientation.

## 2017-06-15 NOTE — PROGRESS NOTES
Patient out from operating room and placed on the ventilator on documented settings. Patient is orally intubated with a # 8.0 ET Tube secured at the 24 cm damaris at the lip. Breath sounds are clear. Trachea is midline. Negative for subcutaneous air, chest excursion is symmetric. Negative for pitting edema. Patient is also Negative for cyanosis. Patient has a Left Radial arterial line. Patient has 2 Chest tubes. All alarms are set and audible. Resuscitation bag is at the head of the bed.       Ventilator Settings  Mode FIO2 Rate Tidal Volume Pressure PEEP I:E Ratio    SIMV  60% 16 500     8.0         Peak airway pressure:     Minute ventilation:       ABG:   Recent Labs      06/15/17   1211   PH  7.40   PCO2  34*   PO2  176*   HCO3  21*         Jackeline Grant, RT

## 2017-06-15 NOTE — PROGRESS NOTES
TRANSFER - OUT REPORT:    Verbal report given to Ajith Zavala RN on Marleni Deutsch  being transferred to [facility] for ordered procedure       Report consisted of patients Situation, Background, Assessment and   Recommendations(SBAR). Information from the following report(s) Kardex was reviewed with the receiving nurse. Lines:   Peripheral IV 06/14/17 Right Antecubital (Active)   Site Assessment Clean, dry, & intact 6/15/2017  4:19 AM   Phlebitis Assessment 0 6/15/2017  4:19 AM   Infiltration Assessment 0 6/15/2017  4:19 AM   Dressing Status Clean, dry, & intact 6/15/2017  4:19 AM   Dressing Type Tape;Transparent 6/15/2017  4:19 AM   Hub Color/Line Status Patent; Infusing 6/15/2017  4:19 AM   Alcohol Cap Used No 6/15/2017  4:19 AM       Peripheral IV 06/14/17 Left Wrist (Active)   Site Assessment Clean, dry, & intact 6/15/2017  4:19 AM   Phlebitis Assessment 0 6/15/2017  4:19 AM   Infiltration Assessment 0 6/15/2017  4:19 AM   Dressing Status Clean, dry, & intact 6/15/2017  4:19 AM   Dressing Type Transparent 6/15/2017  4:19 AM   Hub Color/Line Status Patent 6/15/2017  4:19 AM   Alcohol Cap Used No 6/15/2017  4:19 AM        Opportunity for questions and clarification was provided.       Patient transported with:   [employer]

## 2017-06-15 NOTE — ANESTHESIA PROCEDURE NOTES
Central Line Placement    Start time: 6/15/2017 7:16 AM  End time: 6/15/2017 7:32 AM  Performed by: Laura Ivy  Authorized by: Laura Ivy     Indications: vascular access and central pressure monitoring (Conduit for PA cath)  Preanesthetic Checklist: patient identified, risks and benefits discussed, anesthesia consent, site marked, patient being monitored and timeout performed    Timeout Time: 07:15       Pre-procedure: All elements of maximal sterile barrier technique followed? Yes    Maximal barrier precautions followed, 2% Chlorhexidine for cutaneous antisepsis, Hand hygiene performed prior to catheter insertion and Ultrasound guidance    Sterile Ultrasound Technique followed?: Yes          Procedure:   Prep:  Chlorhexidine  Location:  Internal jugular  Orientation:  Right  Patient position:  Trendelenburg  Number of lumens: Arrow MAC introducer.   Catheter size:  9 Fr    Number of attempts:  1  Successful placement: Yes      Assessment:   Post-procedure:  Catheter secured and sterile dressing with CHG applied  Assessment:  Blood return through all ports, free fluid flow and placement verified by x-ray  Insertion:  Uncomplicated  Patient tolerance:  Patient tolerated the procedure well with no immediate complications

## 2017-06-15 NOTE — PROGRESS NOTES
Spiritual Care visit. Initial Inpatient Visit. Patient was not awake,  left a card, and prayed for patient. Spiritual Care Assessment/Progress Notes    Nathanael Johnson [id number]  [id number]    1940  68 y.o.  male    Patient Telephone Number: [phone] (home)   Jain Affiliation: Quintin Bajwa   Language: English   Extended Emergency Contact Information  Primary Emergency Contact: [facility] Phone: [phone]  Mobile Phone: [phone]  Relation: Spouse   Patient Active Problem List    Diagnosis Date Noted    CAD (coronary artery disease) 06/15/2017    S/P CABG x 2 06/15/2017    Hypoxia 06/15/2017    Encounter for weaning from [facility]ator 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/2013    Elevated prostate specific antigen (PSA) 11/25/2013    Impotence of organic origin 11/25/2013    Unspecified essential hypertension 11/25/2013    Pure hypercholesterolemia 11/25/2013        Date: 6/15/2017       Level of Jain/Spiritual Activity:  []         Involved in román tradition/spiritual practice    []         Not involved in román tradition/spiritual practice  []         Spiritually oriented    []         Claims no spiritual orientation    []         seeking spiritual identity  []         Feels alienated from Yarsanism practice/tradition  []         Feels angry about Yarsanism practice/tradition  [x]         Spirituality/Yarsanism tradition IS a resource for coping at this time.   []         Not able to assess due to medical condition    Services Provided Today:  []         crisis intervention    []         reading Scriptures  [x]         spiritual assessment    [x]         prayer  []         empathic listening/emotional support  []         rites and rituals (cite in comments)  []         life review     []         Yarsanism support  []         theological development   []         advocacy  []         ethical dialog     []         blessing  []         bereavement support    []         support to family  []         anticipatory grief support   []         help with AMD  []         spiritual guidance    []         meditation      Spiritual Care Needs  []         Emotional Support  []         Spiritual/Spiritism Care  []         Loss/Adjustment  []         Advocacy/Referral                /Ethics  []         No needs expressed at               this time  []         Other: (note in               comments)  [address]  []         Follow up visits with               pt/family  []         Provide materials  []         Schedule sacraments  []         Contact Community               Clergy  [x]         Follow up as needed  []         Other: (note in               comments)     Comments: Spiritual Assessment     Advance Directives  Verbal Statement, Legal Next of Kin remains as decision maker. Category/Code Status Full. Family Support  Yes. Spiritual Support  [facility] is hospital .     Deena Somers   Visit by Krystin Arvizu M.Ed., Th.B. ,Staff

## 2017-06-15 NOTE — PROGRESS NOTES
Respiratory Mechanics completed and are as follows:  Weaning Parameters  Spontaneous Breathing Trial Complete: Yes  Resp Rate Observed: 14  Ve: 6.4  VT: 448  RSBI: 31  NIF: -20  Fine Agitation Sedation Scale (RASS): Drowsy  Patient extubated to airvo 40L/35%. Patient is able to communicate and is negative for stridor. Breath sounds are clear. No complications with extubation.      Ashleigh Perez, RT

## 2017-06-15 NOTE — PROGRESS NOTES
TRANSFER - IN REPORT:    Verbal report received from [employer]) on [employer]  being received from OR(unit) for routine post - op      Report consisted of patients Situation, Background, Assessment and   Recommendations(SBAR). Information from the following report(s) SBAR, OR Summary, Intake/Output, MAR, Recent Results and Cardiac Rhythm v paced was reviewed with the receiving nurse. Opportunity for questions and clarification was provided. Assessment completed upon patients arrival to unit and care assumed.      Received to [employer]

## 2017-06-15 NOTE — PROGRESS NOTES
Pt extubated by RT to Airvo @ 40 L/min @ 35 % FiO2. Bilateral breath sounds auscultated. No stridor noted. No other complications noted at this time. Will continue to monitor.

## 2017-06-15 NOTE — ANESTHESIA PROCEDURE NOTES
Arterial Line Placement    Start time: 6/15/2017 7:08 AM  End time: 6/15/2017 7:11 AM  Performed by: Riccardo Jay  Authorized by: David Benites     Pre-Procedure  Indications:  Arterial pressure monitoring and blood sampling  Preanesthetic Checklist: patient identified, risks and benefits discussed, anesthesia consent, site marked, patient being monitored, timeout performed and patient being monitored    Timeout Time: 07:08        Procedure:   Prep:  ChloraPrep  Seldinger Technique?: Yes    Orientation:  Left  Location:  Radial artery  Catheter size:  20 G  Number of attempts:  1  Cont Cardiac Output Sensor: Yes      Assessment:   Post-procedure:  Line secured and sterile dressing applied  Patient Tolerance:  Patient tolerated the procedure well with no immediate complications  Comment:   Left arm prepped with ChloraPrep, 0.8ml of 1% lidocaine infiltrated at skin, Seldinger technique, good blood return, good waveform.

## 2017-06-15 NOTE — CONSULTS
Cardiovascular ICU Consult Note: 6/15/2017  Nathanael Johnson  Admission Date: 6/14/2017     The patient's chart is reviewed and the patient is discussed with the staff. Subjective:     Patient is seen at the request of Dr. Michael Crouch for respiratory management status post cardiac surgery. Patient had CABG x2. Currently is sedated in CV-ICU and orally intubated receiving  mechanical ventilation. 68 y.o. male presented with exertional dyspnea improved with rest.  Left heart cath was performed showing critical left main coronary disease with preserved LVEF. He was admitted to the hospital and placed on heparin drip. Risk factors include hypertension, hyperlipidemia, prior carotid endarterectomy. We have been asked to see in the CV-ICU for mechanical ventilation management and weaning. Prior to Admission Medications   Prescriptions Last Dose Informant Patient Reported? Taking?   aspirin 81 mg chewable tablet 6/14/2017 at 0615  Yes Yes   Sig: Take 81 mg by mouth daily. lisinopril-hydrochlorothiazide (PRINZIDE, ZESTORETIC) 20-12.5 mg per tablet 6/13/2017 at Unknown time  Yes Yes   Sig: Take  by mouth daily. multivitamin (ONE A DAY) tablet 6/13/2017 at Unknown time  Yes Yes   Sig: Take 1 Tab by mouth daily. simvastatin (ZOCOR) 40 mg tablet 6/12/2017 at Unknown time  Yes Yes   Sig: Take 40 mg by mouth nightly. Facility-Administered Medications: None       Review of Systems  Review of systems not obtained due to patient factors.    Sedated and intubated immediatly post op CABG x2.    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      Social History Main Topics    Smoking status: Never Smoker    Smokeless tobacco: Never Used    Alcohol use No    Drug use: No    Sexual activity: Not on file     Other Topics Concern    Not on file     Social History Narrative     Family History   Problem Relation Age of Onset    Cancer Neg Hx     Heart Disease Neg Hx     Hypertension Neg Hx     Kidney Disease Neg Hx      No Known Allergies    Current Facility-Administered Medications   Medication Dose Route Frequency    dextrose 5% - 0.45% NaCl with KCl 20 mEq/L infusion  50 mL/hr IntraVENous CONTINUOUS    albumin human 5% (BUMINATE) solution 25 g  25 g IntraVENous PRN    sodium chloride (NS) flush 5-10 mL  5-10 mL IntraVENous Q8H    sodium chloride (NS) flush 5-10 mL  5-10 mL IntraVENous PRN    traMADol (ULTRAM) tablet 50 mg  50 mg Oral Q6H PRN    oxyCODONE-acetaminophen (PERCOCET 10)  mg per tablet 1 Tab  1 Tab Oral Q4H PRN    morphine injection 3-5 mg  3-5 mg IntraVENous Q1H PRN    naloxone (NARCAN) injection 0.4 mg  0.4 mg IntraVENous PRN    mupirocin (BACTROBAN) 2 % ointment   Both Nostrils BID    ceFAZolin in 0.9% NS (ANCEF) IVPB soln 2 g  2 g IntraVENous Q8H    sodium bicarbonate 8.4 % (1 mEq/mL) injection 50 mEq  50 mEq IntraVENous PRN    DOBUTamine (DOBUTREX) 500 mg/250 mL (2,000 mcg/mL) infusion  2.5-10 mcg/kg/min IntraVENous TITRATE    EPINEPHrine (ADRENALIN) 4,000 mcg in 0.9% sodium chloride 250 mL infusion  0.05-0.1 mcg/kg/min IntraVENous TITRATE    nitroglycerin (Tridil) 200 mcg/ml infusion   mcg/min IntraVENous TITRATE    PHENYLephrine (NEOSYNEPHRINE) 30,000 mcg in 0.9% sodium chloride 250 mL infusion   mcg/min IntraVENous TITRATE    lidocaine (PF) (XYLOCAINE) 100 mg/5 mL (2 %) injection syringe  mg   mg IntraVENous ONCE PRN    niCARdipine in Saline (CARDENE) 0.1mg/mL 200 ml premix infusion  5-15 mg/hr IntraVENous TITRATE    amiodarone (CORDARONE) tablet 400 mg  400 mg Oral Q12H    metoprolol tartrate (LOPRESSOR) tablet 12.5 mg  12.5 mg Oral BID    atorvastatin (LIPITOR) tablet 40 mg  40 mg Oral QPM    ondansetron (ZOFRAN) injection 4 mg  4 mg IntraVENous Q4H PRN    albuterol (PROVENTIL VENTOLIN) nebulizer solution 1.25 mg  1.25 mg Nebulization Q4H RT    albuterol (PROVENTIL HFA, VENTOLIN HFA, PROAIR HFA) inhaler 2 Puff  2 Puff Inhalation Q4H RT    insulin regular (NOVOLIN R, HUMULIN R) 100 Units in 0.9% sodium chloride 100 mL infusion  1 Units/hr IntraVENous TITRATE    dextrose (D50W) injection syrg 12.5 g  25 mL IntraVENous PRN    [START ON 6/16/2017] aspirin chewable tablet 81 mg  81 mg Oral DAILY    magnesium sulfate 1 g/100 ml IVPB (premix or compounded)  1 g IntraVENous PRN    potassium chloride 10 mEq in 50 ml IVPB  10 mEq IntraVENous PRN    midazolam (VERSED) injection 1 mg  1 mg IntraVENous Q1H PRN    propofol (DIPRIVAN) infusion  5-50 mcg/kg/min IntraVENous CONTINUOUS    meperidine (DEMEROL) injection 25 mg  25 mg IntraVENous Q1H PRN    chlorhexidine (PERIDEX) 0.12 % mouthwash 10 mL  10 mL Oral BID    tuberculin injection 5 Units  5 Units IntraDERMal ONCE    albumin human 5% (BUMINATE) solution    CONTINUOUS    ceFAZolin (ANCEF) 1 g in sodium chloride irrigation 0.9 % 1,000 mL solution    PRN    papaverine injection    PRN    vancomycin (VANCOCIN) injection    PRN         Objective:     Vitals:    06/15/17 0544 06/15/17 1209 06/15/17 1213 06/15/17 1230   BP: 154/74 102/52     Pulse: (!) 53 80  (!) 56   Resp: 16 15     Temp: 98.1 °F (36.7 °C) 98.9 °F (37.2 °C) 97.7 °F (36.5 °C)    SpO2: 96% 99%  99%   Weight: Height:           Intake and Output:   06/13 1901 - 06/15 0700  In: 875.5 [P.O.:240; I.V.:635.5]  Out: -   06/15 0701 - 06/15 1900  In: 6377 [I.V.:1200]  Out: 1005 [Urine:970]    Physical Exam:          Constitutional:  Sedated, orally intubated and mechanically ventilated. EENMT:  Sclera clear, pupils equal, oral mucosa moist and orally intubated  Respiratory: clear and equal breath sounds  Cardiovascular:  RRR with audible rub  Gastrointestinal:  soft; no bowel sounds present  Musculoskeletal:  warm with no cyanosis, no lower leg edema. [place] site left leg with ace wrap. Sedated with no movements. SKIN:  no jaundice or ecchymosis   Neurologic:  sedated but no gross neuro deficits  Psychiatric:  sedated and unable to assess at this time    CXR:        LINES:  ETT, bernardo, swan smith, arterial line, chest tubes times 2 in epigastric area without air leak. DRIPS:  NTG, Rudy-synephrine    CI:  2.1    Ventilator Settings  Mode FIO2 Rate Tidal Volume Pressure PEEP                        Peak airway pressure:     Minute ventilation:       ABG:   Recent Labs      06/15/17   1211   PH  7.40   PCO2  34*   PO2  176*   HCO3  21*        LAB  Recent Labs      06/15/17   1110  06/14/17   1809   WBC  9.5  7.3   HGB  9.0*  13.7   HCT  27.1*  39.6*   PLT  156  259   INR   --   1.0     Recent Labs      06/14/17   1809   NA  141   K  3.8   CL  104   CO2  26   GLU  142*   BUN  13   CREA  1.31   CA  8.4   ALB  3.3   SGOT  24     No results for input(s): LCAD, LAC in the last 72 hours.     Assessment and Plan :  (Medical Decision Making)     Hospital Problems  Date Reviewed: 6/15/2017          Codes Class Noted POA    * (Principal)CAD (coronary artery disease) (Chronic) ICD-10-CM: I25.10  ICD-9-CM: 414.00  6/15/2017 Yes    multivessel    S/P CABG x 2 ICD-10-CM: Z95.1  ICD-9-CM: V45.81  6/15/2017 No     6/15/17 Dr. Orellana Countess (CABG X 2)/ LIMA to LAD, RSVG to OM     L Greater saphenous VEIN HARVEST         Per CV surgery    Hypoxia ICD-10-CM: R09.02  ICD-9-CM: 799.02  6/15/2017 No    Sat acceptable    Encounter for weaning from ventilator [facility]) ICD-10-CM: Z99.11  ICD-9-CM: V46.13  6/15/2017 No    Per protocol    Unstable angina ([facility] [address] ICD-10-CM: I20.0  ICD-9-CM: 411.1  6/14/2017 Unknown    On admission          Plan:   --Wean mechanical ventilation per protocol. --Bronchodilators per protocol. --Incentive spirometry every hour post extubation. --Review CXR    More than 50% of the time documented was spent in face-to-face contact with the patient and in the care of the patient on the floor/unit where the patient is located. Thank you for this referral.  We appreciate the opportunity to participate in this patient's care. Will follow along with you. Kristen Huddleston NP        Lungs:  CTA B, intubated. Heart:  RRR with no Murmur/Rubs/Gallops    Additional Comments:    Post procedure CXR pending. CXR from earlier this morning reviewed. I have spoken with and examined the patient. I agree with the above assessment and plan as documented.     Emma King MD

## 2017-06-15 NOTE — ANESTHESIA POSTPROCEDURE EVALUATION
Post-Anesthesia Evaluation and Assessment    Patient: Elroy Kern MRN: [id number]  SSN: [id number]    YOB: 1940  Age: 68 y.o. Sex: male       Cardiovascular Function/Vital Signs  Visit Vitals    /52    Pulse (!) 59    Temp 36.5 °C (97.7 °F)    Resp 14    Ht 5' 6\" (1.676 m)    Wt 79 kg (174 lb 1.6 oz)    SpO2 99%    BMI 28.1 kg/m2       Patient is status post general anesthesia for Procedure(s):  CORONARY ARTERY BYPASS GRAFT (CABG X 2)/ LIMA   VEIN HARVEST  ESOPHAGEAL TRANS ECHOCARDIOGRAM.    Nausea/Vomiting: None    Postoperative hydration reviewed and adequate. Pain:  Pain Scale 1: Visual (06/15/17 1213)  Pain Intensity 1: 0 (06/15/17 1213)   Managed    Neurological Status:   Neuro  Neurologic State: Anesthetized (06/15/17 1230)  Orientation Level: Unable to verbalize (06/15/17 1230)  Cognition: No command following (06/15/17 1230)  Speech: Intubated (06/15/17 1230)  Assessment L Pupil: Sluggish;Round (06/15/17 1230)  Size L Pupil (mm): 2 (06/15/17 1230)  Assessment R Pupil: Sluggish;Round (06/15/17 1230)  Size R Pupil (mm): 2 (06/15/17 1230)  LUE Motor Response: Flaccid (06/15/17 1230)  LLE Motor Response: Flaccid (06/15/17 1230)  RUE Motor Response: Flaccid (06/15/17 1230)  RLE Motor Response: Flaccid (06/15/17 1230)   At baseline    Mental Status and Level of Consciousness: Arousable    Pulmonary Status:   O2 Device: Ventilator (06/15/17 1230)   Adequate oxygenation and airway patent    Complications related to anesthesia: None    Post-anesthesia assessment completed.  No concerns    Signed By: Erum Suarez MD     Yamel 15, 2017

## 2017-06-15 NOTE — OP NOTES
[address]   OPERATIVE REPORT       Name:  Liana Lindsey   MR#:  [id number]   :  1940   Account #:  [de-identified]   Date of Adm:  2017       DATE OF SURGERY: 06/15/2017. PREOPERATIVE DIAGNOSIS: Coronary artery disease with unstable   angina. POSTOPERATIVE DIAGNOSIS: Coronary artery disease with unstable   angina. PROCEDURE:   1. CABG x2 (LIMA to LAD and reverse saphenous vein graft to   obtuse marginal). 2. Left thigh open greater saphenous vein harvest.    SURGEON: Theresa Brown MD.    ANESTHESIA: General.      PERFUSION DATA: Cross-clamp time 38 minutes. Bypass time 60   minutes. BLOOD PRODUCTS: None. ESTIMATED BLOOD LOSS: Not applicable secondary to   cardiopulmonary bypass. TEMPORARY ITEMS TO BE REMOVED:   1. Temporary epicardial ventricular pacing wires. 2. A 36-Barbadian anterior mediastinal chest. Tube. 3. A 19-Barbadian left pleural Lamont drain. SPECIMENS: None. COMPLICATIONS: None. FINDINGS: Good quality LIMA and saphenous vein, soft aorta,   severe mediastinal adhesions from prior pericardial window   making for difficult target identification. OM was a good-sized   target. LAD was approximately to 1.3 to 1.4 mm and very small. Good flow in all grafts. No problems  from bypass. Good   LV function. INDICATION FOR PROCEDURE: The patient is a 27-year-old man with   a history of hypertension, hyperlipidemia, who presented with   increasing exertional dyspnea, thought to be an anginal   equivalent. He had an abnormal nuclear stress test and left   heart cath showed critical left main coronary artery disease. The patient was admitted to the hospital, placed on heparin   drip. Preoperative workup was obtained. Echocardiogram showed   preserved left ventricular ejection fraction. No significant   valve pathology. The patient was referred for CABG.  After   thorough discussion of all the risks and proposed benefits, the   patient agreed to proceed. DESCRIPTION OF PROCEDURE: The patient premedicated and brought   to the operating suite. A time-out performed. Standard   monitoring lines were placed. The patient was intubated and   placed under general anesthesia without event. Arms were tucked   at the sides. All pressure points padded. The patient prepped   and draped in standard, meticulous surgical fashion. Ioban   drapes were used. Greater saphenous vein harvested from the upper left thigh using   2 incisions with intervening skin bridge. Vein was removed and   prepared on the back table. Side branches were doubly clipped. Hemostasis was ensured in the leg wound, which was then closed   in 2 layers with running Vicryl suture. Standard median sternotomy performed. Left pleural space opened   widely. There were adhesions. LIMA harvested in a pedicled   fashion. The patient heparinized and the LIMA divided distally   between multiple clips and infiltrated with papaverine. Pericardium was opened. This was very tedious due to dense   adhesions between the pericardium and the ventricle, right   atrium, and great vessels. The diaphragmatic aspect of the heart   was mobilized around the right atrium and up the aorta. Cannulation stitches were placed. After confirmation of   therapeutic ACT, central aortic cannulation and single dual   stage central venous cannulation were performed. Retrograde   autologous priming performed. Ascending aortic root   vent/cardioplegia tack placed. The patient was placed on   cardiopulmonary bypass. Drainage was good. At this point, the   remaining adhesions between the left ventricle and the lateral   aspect of the left ventricle in the posterior aspect could be   freed up. This was done sharply with Metzenbaum scissors to   avoid any injury to the phrenic nerve. Once all the adhesions   were freed up the native targets were inspected and marked.    These were difficult to locate due to scar tissue. Aortic crossclamp applied and the heart arrested with 1200 mL of   antegrade cold blood cardioplegia. Prompt diastolic arrest was   achieved and an intermittent dose of cardioplegia would be given   antegrade after about 20 minutes. The lateral heart wall of the   heart was exposed. The obtuse marginal was identified and an   arteriotomy was created and enlarged with arteriotomy scissors. This was a good sized vessel. The end of the segment of reverse   saphenous vein was anastomosed to the target in an end-to-side   fashion with running double arm 7-0 Prolene suture. The graft   was flushed with cold cardioplegia to ensure good runoff and no   leaks and then measured and cut to length to reach the ascending   aorta. A 4 mm punch aortotomy was created and the proximal   anastomosis was constructed in end-to-side fashion with running   double armed 5-0 Prolene suture. Rewarming was begun. Left   ventricle elevated with a cold wet laparotomy pad. Arteriotomy   created in the mid LAD. This was a very small vessel. The LIMA   was spatulated and good flow confirmed and then the pedicle   occluded with an atraumatic bulldog. LIMA anastomosed to the LAD   with a running double arm 7-0 Prolene suture. ORESTES pedicle fascia   tacked to the surrounding epicardium. A terminal hot shot dose   of warm antegrade blood cardioplegia was administered after de-  airing the aortic root. Temporary epicardial ventricular pacing   wires were placed. Left pleural space aspirated and ventilation   resumed. The patient was placed in Trendelenburg and the cross-  clamp and ORESTES bulldog released reperfusing the heart. Ventricular pacing was employed. All anastomoses were checked   for hemostasis, which was good. De-airing was accomplished   through the aortic root vent. Once this was complete, the root   vent was removed and the root vent site reinforced with a 4-0   Prolene pursestring.  The patient was easily  from cardiopulmonary bypass. Decannulation proceeded uneventfully. Protamine was administered, and the mediastinum became   hemostatic. Anterior mediastinum drained with a 36-Croatian chest   tube. Vancomycin paste applied to the sternal edges. Sternum   reapproximated with 7 heavy gauge stainless steel wires. Soft   tissue was irrigated and then the fascia, soft tissue, and skin   were closed anatomically in 3 separate layers with running   Vicryl suture. Wounds were cleansed, sterile dressings placed,   and the chest tubes were connected to suction. At the end of the   case, all sharp, sponge, and instrument counts were reported as   being correct.         MD Frederic Maier / Debo Ledesma   D:  06/15/2017   12:27   T:  06/15/2017   13:13   Job #:  [id number]

## 2017-06-16 ENCOUNTER — APPOINTMENT (OUTPATIENT)
Dept: GENERAL RADIOLOGY | Age: 77
DRG: 234 | End: 2017-06-16
Attending: THORACIC SURGERY (CARDIOTHORACIC VASCULAR SURGERY)
Payer: MEDICARE

## 2017-06-16 PROBLEM — J98.11 ATELECTASIS: Status: ACTIVE | Noted: 2017-06-16

## 2017-06-16 PROBLEM — Z99.11 ENCOUNTER FOR WEANING FROM VENTILATOR (HCC): Status: RESOLVED | Noted: 2017-06-15 | Resolved: 2017-06-16

## 2017-06-16 PROBLEM — E87.0 HYPERNATREMIA: Status: ACTIVE | Noted: 2017-06-16

## 2017-06-16 LAB
ANION GAP BLD CALC-SCNC: 7 MMOL/L (ref 7–16)
ATRIAL RATE: 80 BPM
BASE DEFICIT BLD-SCNC: 1 MMOL/L
BUN SERPL-MCNC: 16 MG/DL (ref 8–23)
CA-I BLD-MCNC: 1.4 MMOL/L (ref 1.12–1.32)
CALCIUM SERPL-MCNC: 7.8 MG/DL (ref 8.3–10.4)
CALCULATED P AXIS, ECG09: 52 DEGREES
CALCULATED R AXIS, ECG10: 30 DEGREES
CALCULATED T AXIS, ECG11: 48 DEGREES
CHLORIDE SERPL-SCNC: 119 MMOL/L (ref 98–107)
CO2 SERPL-SCNC: 25 MMOL/L (ref 21–32)
CREAT SERPL-MCNC: 1.46 MG/DL (ref 0.8–1.5)
DIAGNOSIS, 93000: NORMAL
ERYTHROCYTE [DISTWIDTH] IN BLOOD BY AUTOMATED COUNT: 14.6 % (ref 11.9–14.6)
GLUCOSE BLD STRIP.AUTO-MCNC: 101 MG/DL (ref 65–100)
GLUCOSE BLD STRIP.AUTO-MCNC: 102 MG/DL (ref 65–100)
GLUCOSE BLD STRIP.AUTO-MCNC: 104 MG/DL (ref 65–100)
GLUCOSE BLD STRIP.AUTO-MCNC: 112 MG/DL (ref 70–105)
GLUCOSE BLD STRIP.AUTO-MCNC: 124 MG/DL (ref 65–100)
GLUCOSE BLD STRIP.AUTO-MCNC: 131 MG/DL (ref 65–100)
GLUCOSE BLD STRIP.AUTO-MCNC: 169 MG/DL (ref 65–100)
GLUCOSE BLD STRIP.AUTO-MCNC: 190 MG/DL (ref 65–100)
GLUCOSE BLD STRIP.AUTO-MCNC: 201 MG/DL (ref 65–100)
GLUCOSE BLD STRIP.AUTO-MCNC: 95 MG/DL (ref 65–100)
GLUCOSE SERPL-MCNC: 100 MG/DL (ref 65–100)
HCO3 BLD-SCNC: 24.2 MMOL/L (ref 22–26)
HCT VFR BLD AUTO: 28.3 % (ref 41.1–50.3)
HGB BLD-MCNC: 9.3 G/DL (ref 13.6–17.2)
MAGNESIUM SERPL-MCNC: 3 MG/DL (ref 1.8–2.4)
MCH RBC QN AUTO: 29.1 PG (ref 26.1–32.9)
MCHC RBC AUTO-ENTMCNC: 32.9 G/DL (ref 31.4–35)
MCV RBC AUTO: 88.4 FL (ref 79.6–97.8)
P-R INTERVAL, ECG05: 188 MS
PCO2 BLD: 40.8 MMHG (ref 35–45)
PH BLD: 7.38 [PH] (ref 7.35–7.45)
PLATELET # BLD AUTO: 135 K/UL (ref 150–450)
PMV BLD AUTO: 9 FL (ref 10.8–14.1)
PO2 BLD: 388 MMHG (ref 80–105)
POTASSIUM BLD-SCNC: 5 MMOL/L (ref 3.5–5.1)
POTASSIUM SERPL-SCNC: 4.5 MMOL/L (ref 3.5–5.1)
Q-T INTERVAL, ECG07: 406 MS
QRS DURATION, ECG06: 84 MS
QTC CALCULATION (BEZET), ECG08: 468 MS
RBC # BLD AUTO: 3.2 M/UL (ref 4.23–5.67)
SAO2 % BLD: 100 % (ref 95–98)
SODIUM BLD-SCNC: 138 MMOL/L (ref 138–146)
SODIUM SERPL-SCNC: 151 MMOL/L (ref 136–145)
VENTRICULAR RATE, ECG03: 80 BPM
WBC # BLD AUTO: 7.7 K/UL (ref 4.3–11.1)

## 2017-06-16 PROCEDURE — 74011636637 HC RX REV CODE- 636/637: Performed by: THORACIC SURGERY (CARDIOTHORACIC VASCULAR SURGERY)

## 2017-06-16 PROCEDURE — 74011250637 HC RX REV CODE- 250/637: Performed by: NURSE PRACTITIONER

## 2017-06-16 PROCEDURE — 71010 XR CHEST SNGL V: CPT

## 2017-06-16 PROCEDURE — 82962 GLUCOSE BLOOD TEST: CPT

## 2017-06-16 PROCEDURE — 99232 SBSQ HOSP IP/OBS MODERATE 35: CPT | Performed by: INTERNAL MEDICINE

## 2017-06-16 PROCEDURE — 65660000004 HC RM CVT STEPDOWN

## 2017-06-16 PROCEDURE — 74011250636 HC RX REV CODE- 250/636: Performed by: NURSE PRACTITIONER

## 2017-06-16 PROCEDURE — 74011250636 HC RX REV CODE- 250/636: Performed by: THORACIC SURGERY (CARDIOTHORACIC VASCULAR SURGERY)

## 2017-06-16 PROCEDURE — 85027 COMPLETE CBC AUTOMATED: CPT | Performed by: THORACIC SURGERY (CARDIOTHORACIC VASCULAR SURGERY)

## 2017-06-16 PROCEDURE — 83735 ASSAY OF MAGNESIUM: CPT | Performed by: THORACIC SURGERY (CARDIOTHORACIC VASCULAR SURGERY)

## 2017-06-16 PROCEDURE — 80048 BASIC METABOLIC PNL TOTAL CA: CPT | Performed by: THORACIC SURGERY (CARDIOTHORACIC VASCULAR SURGERY)

## 2017-06-16 PROCEDURE — 36600 WITHDRAWAL OF ARTERIAL BLOOD: CPT

## 2017-06-16 PROCEDURE — 74011250637 HC RX REV CODE- 250/637: Performed by: THORACIC SURGERY (CARDIOTHORACIC VASCULAR SURGERY)

## 2017-06-16 PROCEDURE — 93005 ELECTROCARDIOGRAM TRACING: CPT | Performed by: THORACIC SURGERY (CARDIOTHORACIC VASCULAR SURGERY)

## 2017-06-16 RX ORDER — MUPIROCIN 20 MG/G
OINTMENT TOPICAL 2 TIMES DAILY
Status: DISCONTINUED | OUTPATIENT
Start: 2017-06-16 | End: 2017-06-19 | Stop reason: HOSPADM

## 2017-06-16 RX ORDER — POTASSIUM CHLORIDE 20 MEQ/1
40 TABLET, EXTENDED RELEASE ORAL
Status: DISCONTINUED | OUTPATIENT
Start: 2017-06-16 | End: 2017-06-19 | Stop reason: HOSPADM

## 2017-06-16 RX ORDER — ASPIRIN 81 MG/1
81 TABLET ORAL DAILY
Status: DISCONTINUED | OUTPATIENT
Start: 2017-06-17 | End: 2017-06-19 | Stop reason: HOSPADM

## 2017-06-16 RX ORDER — LANOLIN ALCOHOL/MO/W.PET/CERES
1 CREAM (GRAM) TOPICAL
Status: DISCONTINUED | OUTPATIENT
Start: 2017-06-17 | End: 2017-06-19 | Stop reason: HOSPADM

## 2017-06-16 RX ORDER — FAMOTIDINE 20 MG/1
20 TABLET, FILM COATED ORAL DAILY
Status: DISCONTINUED | OUTPATIENT
Start: 2017-06-16 | End: 2017-06-19 | Stop reason: HOSPADM

## 2017-06-16 RX ORDER — SODIUM CHLORIDE 0.9 % (FLUSH) 0.9 %
5-10 SYRINGE (ML) INJECTION EVERY 8 HOURS
Status: DISCONTINUED | OUTPATIENT
Start: 2017-06-16 | End: 2017-06-19 | Stop reason: HOSPADM

## 2017-06-16 RX ORDER — POTASSIUM CHLORIDE 20 MEQ/1
20 TABLET, EXTENDED RELEASE ORAL
Status: DISCONTINUED | OUTPATIENT
Start: 2017-06-16 | End: 2017-06-19 | Stop reason: HOSPADM

## 2017-06-16 RX ORDER — MAG HYDROX/ALUMINUM HYD/SIMETH 200-200-20
30 SUSPENSION, ORAL (FINAL DOSE FORM) ORAL
Status: DISCONTINUED | OUTPATIENT
Start: 2017-06-16 | End: 2017-06-19 | Stop reason: HOSPADM

## 2017-06-16 RX ORDER — OXYCODONE AND ACETAMINOPHEN 5; 325 MG/1; MG/1
1 TABLET ORAL
Status: DISCONTINUED | OUTPATIENT
Start: 2017-06-16 | End: 2017-06-19 | Stop reason: HOSPADM

## 2017-06-16 RX ORDER — ONDANSETRON 2 MG/ML
4 INJECTION INTRAMUSCULAR; INTRAVENOUS
Status: DISCONTINUED | OUTPATIENT
Start: 2017-06-16 | End: 2017-06-19 | Stop reason: HOSPADM

## 2017-06-16 RX ORDER — FAMOTIDINE 20 MG/1
20 TABLET, FILM COATED ORAL 2 TIMES DAILY
Status: DISCONTINUED | OUTPATIENT
Start: 2017-06-16 | End: 2017-06-16 | Stop reason: SDUPTHER

## 2017-06-16 RX ORDER — FUROSEMIDE 40 MG/1
40 TABLET ORAL DAILY
Status: DISCONTINUED | OUTPATIENT
Start: 2017-06-17 | End: 2017-06-19 | Stop reason: HOSPADM

## 2017-06-16 RX ORDER — ACETAMINOPHEN 325 MG/1
650 TABLET ORAL
Status: DISCONTINUED | OUTPATIENT
Start: 2017-06-16 | End: 2017-06-19 | Stop reason: HOSPADM

## 2017-06-16 RX ORDER — AMOXICILLIN 250 MG
2 CAPSULE ORAL 2 TIMES DAILY
Status: DISCONTINUED | OUTPATIENT
Start: 2017-06-16 | End: 2017-06-19 | Stop reason: HOSPADM

## 2017-06-16 RX ORDER — SODIUM CHLORIDE 0.9 % (FLUSH) 0.9 %
5-10 SYRINGE (ML) INJECTION AS NEEDED
Status: DISCONTINUED | OUTPATIENT
Start: 2017-06-16 | End: 2017-06-19 | Stop reason: HOSPADM

## 2017-06-16 RX ORDER — METOPROLOL TARTRATE 25 MG/1
25 TABLET, FILM COATED ORAL EVERY 12 HOURS
Status: DISCONTINUED | OUTPATIENT
Start: 2017-06-16 | End: 2017-06-19 | Stop reason: HOSPADM

## 2017-06-16 RX ORDER — BISACODYL 5 MG
10 TABLET, DELAYED RELEASE (ENTERIC COATED) ORAL DAILY PRN
Status: DISCONTINUED | OUTPATIENT
Start: 2017-06-16 | End: 2017-06-19 | Stop reason: HOSPADM

## 2017-06-16 RX ORDER — LANOLIN ALCOHOL/MO/W.PET/CERES
400 CREAM (GRAM) TOPICAL
Status: DISCONTINUED | OUTPATIENT
Start: 2017-06-16 | End: 2017-06-19 | Stop reason: HOSPADM

## 2017-06-16 RX ORDER — POTASSIUM CHLORIDE 20 MEQ/1
20 TABLET, EXTENDED RELEASE ORAL DAILY
Status: DISCONTINUED | OUTPATIENT
Start: 2017-06-17 | End: 2017-06-19 | Stop reason: HOSPADM

## 2017-06-16 RX ORDER — FUROSEMIDE 40 MG/1
40 TABLET ORAL DAILY
Status: DISCONTINUED | OUTPATIENT
Start: 2017-06-17 | End: 2017-06-16 | Stop reason: SDUPTHER

## 2017-06-16 RX ADMIN — Medication 10 ML: at 05:58

## 2017-06-16 RX ADMIN — METOPROLOL TARTRATE 25 MG: 25 TABLET, FILM COATED ORAL at 08:55

## 2017-06-16 RX ADMIN — MUPIROCIN: 20 OINTMENT TOPICAL at 08:56

## 2017-06-16 RX ADMIN — FAMOTIDINE 20 MG: 20 TABLET ORAL at 13:01

## 2017-06-16 RX ADMIN — Medication 10 ML: at 15:09

## 2017-06-16 RX ADMIN — INSULIN HUMAN 5 UNITS: 100 INJECTION, SOLUTION PARENTERAL at 13:00

## 2017-06-16 RX ADMIN — ASPIRIN 81 MG 81 MG: 81 TABLET ORAL at 08:55

## 2017-06-16 RX ADMIN — Medication 10 ML: at 21:02

## 2017-06-16 RX ADMIN — ACETAMINOPHEN 650 MG: 325 TABLET, FILM COATED ORAL at 20:58

## 2017-06-16 RX ADMIN — CEFAZOLIN 2 G: 1 INJECTION, POWDER, FOR SOLUTION INTRAMUSCULAR; INTRAVENOUS; PARENTERAL at 00:02

## 2017-06-16 RX ADMIN — CEFAZOLIN 2 G: 1 INJECTION, POWDER, FOR SOLUTION INTRAMUSCULAR; INTRAVENOUS; PARENTERAL at 17:35

## 2017-06-16 RX ADMIN — Medication 10 ML: at 17:35

## 2017-06-16 RX ADMIN — MUPIROCIN: 20 OINTMENT TOPICAL at 17:35

## 2017-06-16 RX ADMIN — INSULIN HUMAN 10 UNITS: 100 INJECTION, SOLUTION PARENTERAL at 21:02

## 2017-06-16 RX ADMIN — AMIODARONE HYDROCHLORIDE 400 MG: 200 TABLET ORAL at 20:58

## 2017-06-16 RX ADMIN — ATORVASTATIN CALCIUM 40 MG: 40 TABLET, FILM COATED ORAL at 17:20

## 2017-06-16 RX ADMIN — AMIODARONE HYDROCHLORIDE 400 MG: 200 TABLET ORAL at 08:55

## 2017-06-16 RX ADMIN — STANDARDIZED SENNA CONCENTRATE AND DOCUSATE SODIUM 2 TABLET: 8.6; 5 TABLET, FILM COATED ORAL at 17:20

## 2017-06-16 RX ADMIN — CEFAZOLIN 2 G: 1 INJECTION, POWDER, FOR SOLUTION INTRAMUSCULAR; INTRAVENOUS; PARENTERAL at 08:54

## 2017-06-16 RX ADMIN — ONDANSETRON 4 MG: 2 INJECTION INTRAMUSCULAR; INTRAVENOUS at 09:47

## 2017-06-16 RX ADMIN — TRAMADOL HYDROCHLORIDE 50 MG: 50 TABLET, FILM COATED ORAL at 17:20

## 2017-06-16 RX ADMIN — METOPROLOL TARTRATE 25 MG: 25 TABLET, FILM COATED ORAL at 20:58

## 2017-06-16 RX ADMIN — INSULIN HUMAN 5 UNITS: 100 INJECTION, SOLUTION PARENTERAL at 17:21

## 2017-06-16 RX ADMIN — TRAMADOL HYDROCHLORIDE 50 MG: 50 TABLET, FILM COATED ORAL at 10:50

## 2017-06-16 RX ADMIN — ONDANSETRON 4 MG: 2 INJECTION INTRAMUSCULAR; INTRAVENOUS at 17:33

## 2017-06-16 RX ADMIN — TRAMADOL HYDROCHLORIDE 50 MG: 50 TABLET, FILM COATED ORAL at 04:11

## 2017-06-16 NOTE — PROGRESS NOTES
Bedside shift report received from Dilshad Lopez, [address] [place] [address] (offgoing nurse). Report given to April Dodge RN. Vital signs stable. No complaints noted. Patient in stable condition.

## 2017-06-16 NOTE — PROGRESS NOTES
R IJ cordis, MCT, pleural celeste, and bernardo catheter removed with no complications. Occlusive dressing placed of CT sites. Sterile dressing placed over R IJ site. Will continue to monitor.

## 2017-06-16 NOTE — PROGRESS NOTES
Dual skin assessment completed with RN. Allevyn pulled back and sacrum visualized. Sacrum benign. No redness or breakdown noted.

## 2017-06-16 NOTE — PROGRESS NOTES
Left wrist arterial line and right swan smith catheter removed. No complications noted. Hemostasis achieved and gauze dressing applied. Will continue to monitor.

## 2017-06-16 NOTE — PROGRESS NOTES
CV Progress Note    Admit Date: 6/14/2017    POD: 1 CABG x 2    Subjective:     Patient present conditions: Awake, Alert and Cooperative. No complaints. Extubated last night. Just off dobutamine (really just needed volume). CI 2.9    Review of Systems   Cardiac: Vital signs stable. Lines in  Respiratory: Chest x-ray clear. Minimal chest tube drainage. extubated  Neuro: Moves all four extremities. Incision: Dry. GI: Taking liquids. Objective:     Vitals:  Blood pressure 127/68, pulse 70, temperature 98 °F (36.7 °C), resp. rate 15, height 5' 6\" (1.676 m), weight 190 lb 7.6 oz (86.4 kg), SpO2 97 %. I/O:     06/14 1901 - 06/16 0700  In: 2696.6 [I.V.:2306.6]  Out: 7913 [Urine:3246; Drains:155]    Heart: No Murmur. Lung: Working with IS. Neuro: No deficit. Cooperative. Incisions: Dry.     ECG/Telemetry: Unchanged from Pre-Op    Labs:  Recent Results (from the past 12 hour(s))   GLUCOSE, POC    Collection Time: 06/15/17  9:24 PM   Result Value Ref Range    Glucose (POC) 100 65 - 100 mg/dL   GLUCOSE, POC    Collection Time: 06/15/17 11:01 PM   Result Value Ref Range    Glucose (POC) 101 (H) 65 - 100 mg/dL   GLUCOSE, POC    Collection Time: 06/16/17 12:09 AM   Result Value Ref Range    Glucose (POC) 102 (H) 65 - 100 mg/dL   GLUCOSE, POC    Collection Time: 06/16/17  1:55 AM   Result Value Ref Range    Glucose (POC) 95 65 - 132 mg/dL   METABOLIC PANEL, BASIC    Collection Time: 06/16/17  3:00 AM   Result Value Ref Range    Sodium 151 (H) 136 - 145 mmol/L    Potassium 4.5 3.5 - 5.1 mmol/L    Chloride 119 (H) 98 - 107 mmol/L    CO2 25 21 - 32 mmol/L    Anion gap 7 7 - 16 mmol/L    Glucose 100 65 - 100 mg/dL    BUN 16 8 - 23 MG/DL    Creatinine 1.46 0.8 - 1.5 MG/DL    GFR est AA >60 >60 ml/min/1.73m2    GFR est non-AA 50 (L) >60 ml/min/1.73m2    Calcium 7.8 (L) 8.3 - 10.4 MG/DL   MAGNESIUM    Collection Time: 06/16/17  3:00 AM   Result Value Ref Range    Magnesium 3.0 (H) 1.8 - 2.4 mg/dL   CBC W/O DIFF Collection Time: 06/16/17  3:00 AM   Result Value Ref Range    WBC 7.7 4.3 - 11.1 K/uL    RBC 3.20 (L) 4.23 - 5.67 M/uL    HGB 9.3 (L) 13.6 - 17.2 g/dL    HCT 28.3 (L) 41.1 - 50.3 %    MCV 88.4 79.6 - 97.8 FL    MCH 29.1 26.1 - 32.9 PG    MCHC 32.9 31.4 - 35.0 g/dL    RDW 14.6 11.9 - 14.6 %    PLATELET 419 (L) 906 - 450 K/uL    MPV 9.0 (L) 10.8 - 14.1 FL   GLUCOSE, POC    Collection Time: 06/16/17  3:09 AM   Result Value Ref Range    Glucose (POC) 104 (H) 65 - 100 mg/dL   GLUCOSE, POC    Collection Time: 06/16/17  4:14 AM   Result Value Ref Range    Glucose (POC) 101 (H) 65 - 100 mg/dL   GLUCOSE, POC    Collection Time: 06/16/17  5:55 AM   Result Value Ref Range    Glucose (POC) 124 (H) 65 - 100 mg/dL   EKG, 12 LEAD, SUBSEQUENT    Collection Time: 06/16/17  6:13 AM   Result Value Ref Range    Ventricular Rate 80 BPM    Atrial Rate 80 BPM    P-R Interval 188 ms    QRS Duration 84 ms    Q-T Interval 406 ms    QTC Calculation (Bezet) 468 ms    Calculated P Axis 52 degrees    Calculated R Axis 30 degrees    Calculated T Axis 48 degrees    Diagnosis       Normal sinus rhythm  Normal ECG  When compared with ECG of 15-LIDIA-2017 12:50,  No significant change was found  Confirmed by Paradise Marcano MD (), Fortino Lee ([id number]) on 6/16/2017 8:01:24 AM     GLUCOSE, POC    Collection Time: 06/16/17  7:16 AM   Result Value Ref Range    Glucose (POC) 131 (H) 65 - 100 mg/dL       Assessment:     HD Stable, sinus, neuro in tact. Minimal CT output. Good UO. Cr up a little from baseline of 1.2-1.3      Plan/Recommendations/Medical Decision Making:   Dc lines, tubes, drains. Mobilize  Start asa, statin, beta blocker  Wait on lasix until tomorrow  Monitor renal function  Transfer to stepdown        See orders    Shari Bailey.  Stacia May MD

## 2017-06-16 NOTE — PROGRESS NOTES
TRANSFER - OUT REPORT:    Verbal report given to Gabriella Amaro RN(name) on Flor Card  being transferred to 2227(unit) for routine progression of care       Report consisted of patients Situation, Background, Assessment and   Recommendations(SBAR). Information from the following report(s) SBAR, Kardex, OR Summary, Procedure Summary, Intake/Output, MAR, Recent Results, Med Rec Status and Cardiac Rhythm NSR was reviewed with the receiving nurse. Lines:   Peripheral IV 06/14/17 Left Wrist (Active)   Site Assessment Clean, dry, & intact 6/16/2017 11:00 AM   Phlebitis Assessment 0 6/16/2017 11:00 AM   Infiltration Assessment 0 6/16/2017 11:00 AM   Dressing Status Clean, dry, & intact 6/16/2017 11:00 AM   Dressing Type Transparent 6/16/2017 11:00 AM   Hub Color/Line Status Pink;Flushed;Patent 6/16/2017 11:00 AM   Action Taken Open ports on tubing capped 6/16/2017  3:00 AM   Alcohol Cap Used No 6/15/2017  4:19 AM        Opportunity for questions and clarification was provided.       Patient transported with:   O2 @ 2 liters  Registered Nurse

## 2017-06-16 NOTE — PROGRESS NOTES
TRANSFER - IN REPORT:    Verbal report received from Danielle RN (name) on Geovanny Victoria  being received from (unit) for routine progression of care      Report consisted of patients Situation, Background, Assessment and   Recommendations(SBAR). Information from the following report(s) SBAR, Procedure Summary, MAR, Med Rec Status and Cardiac Rhythm NSR was reviewed with the receiving nurse. Opportunity for questions and clarification was provided. Assessment completed upon patients arrival to unit and care assumed.

## 2017-06-16 NOTE — PROGRESS NOTES
Jose Juarez  Admission Date: 6/14/2017             Daily Progress Note: 6/16/2017    The patient's chart is reviewed and the patient is discussed with the staff. Patient seen s/p CABG x 2 on 6/15. Had critical left main disease. Subjective: The patient was weaned off the ventilator overnight. Now satting 100% on high flow O2. No new issues.      Current Facility-Administered Medications   Medication Dose Route Frequency    dextrose 5% - 0.45% NaCl with KCl 20 mEq/L infusion  50 mL/hr IntraVENous CONTINUOUS    sodium chloride (NS) flush 5-10 mL  5-10 mL IntraVENous Q8H    sodium chloride (NS) flush 5-10 mL  5-10 mL IntraVENous PRN    traMADol (ULTRAM) tablet 50 mg  50 mg Oral Q6H PRN    oxyCODONE-acetaminophen (PERCOCET 10)  mg per tablet 1 Tab  1 Tab Oral Q4H PRN    morphine injection 3-5 mg  3-5 mg IntraVENous Q1H PRN    naloxone (NARCAN) injection 0.4 mg  0.4 mg IntraVENous PRN    mupirocin (BACTROBAN) 2 % ointment   Both Nostrils BID    ceFAZolin in 0.9% NS (ANCEF) IVPB soln 2 g  2 g IntraVENous Q8H    sodium bicarbonate 8.4 % (1 mEq/mL) injection 50 mEq  50 mEq IntraVENous PRN    DOBUTamine (DOBUTREX) 500 mg/250 mL (2,000 mcg/mL) infusion  2.5-10 mcg/kg/min IntraVENous TITRATE    EPINEPHrine (ADRENALIN) 4,000 mcg in 0.9% sodium chloride 250 mL infusion  0.05-0.1 mcg/kg/min IntraVENous TITRATE    nitroglycerin (Tridil) 200 mcg/ml infusion   mcg/min IntraVENous TITRATE    PHENYLephrine (NEOSYNEPHRINE) 30,000 mcg in 0.9% sodium chloride 250 mL infusion   mcg/min IntraVENous TITRATE    lidocaine (PF) (XYLOCAINE) 100 mg/5 mL (2 %) injection syringe  mg   mg IntraVENous ONCE PRN    amiodarone (CORDARONE) tablet 400 mg  400 mg Oral Q12H    metoprolol tartrate (LOPRESSOR) tablet 12.5 mg  12.5 mg Oral BID    atorvastatin (LIPITOR) tablet 40 mg  40 mg Oral QPM    ondansetron (ZOFRAN) injection 4 mg  4 mg IntraVENous Q4H PRN    insulin regular (NOVOLIN R, HUMULIN R) 100 Units in 0.9% sodium chloride 100 mL infusion  1 Units/hr IntraVENous TITRATE    dextrose (D50W) injection syrg 12.5 g  25 mL IntraVENous PRN    aspirin chewable tablet 81 mg  81 mg Oral DAILY    magnesium sulfate 1 g/100 ml IVPB (premix or compounded)  1 g IntraVENous PRN    potassium chloride 10 mEq in 50 ml IVPB  10 mEq IntraVENous PRN    midazolam (VERSED) injection 1 mg  1 mg IntraVENous Q1H PRN    propofol (DIPRIVAN) infusion  5-50 mcg/kg/min IntraVENous CONTINUOUS    meperidine (DEMEROL) injection 25 mg  25 mg IntraVENous Q1H PRN    chlorhexidine (PERIDEX) 0.12 % mouthwash 10 mL  10 mL Oral BID    albumin human 5% (BUMINATE) solution    CONTINUOUS    ceFAZolin (ANCEF) 1 g in sodium chloride irrigation 0.9 % 1,000 mL solution    PRN    papaverine injection    PRN    vancomycin (VANCOCIN) injection    PRN    niCARdipine (CARDENE) 25 mg in 0.9% sodium chloride 250 mL infusion  5-15 mg/hr IntraVENous TITRATE    opium-belladonna (B&O 16-A SUPPRETTE) 16.2-60 mg suppository 1 Suppository  1 Suppository Rectal Q8H PRN       Review of Systems  Constitutional: negative for fever, chills, sweats  Cardiovascular: negative for chest pain, palpitations, syncope, edema  Gastrointestinal: negative for dysphagia, reflux, vomiting, diarrhea, abdominal pain, or melena  Neurologic: negative for focal weakness, numbness, headache    Objective:     Vitals:    06/16/17 0545 06/16/17 0600 06/16/17 0615 06/16/17 0700   BP: 139/54 156/66 150/52 145/62   Pulse: 79 82 81 78   Resp: 14 16 17 13   Temp:  98.1 °F (36.7 °C)     SpO2: 99% 99% 99% 99%   Weight:       Height:         Intake and Output:   06/14 1901 - 06/16 0700  In: 2696.6 [I.V.:2306.6]  Out: 3609 [Urine:3189; Drains:155]       Physical Exam:   Constitution:  the patient is well developed and in no acute distress  EENMT:  Sclera clear, pupils equal, oral mucosa moist  Respiratory: Audible suction from chest tubes.  Otherwise clear.   Cardiovascular:  RRR without M,G,R  Gastrointestinal: soft and non-tender; with positive bowel sounds. Musculoskeletal: warm without cyanosis. There is trace lower leg edema. Skin:  no jaundice or rashes, surgical wounds   Neurologic: no gross neuro deficits     Psychiatric:  alert and oriented x ppt    CHEST XRAY:   4/16/17  1. Postsurgical changes. Status post extubation.     2. Bibasilar densities, likely atelectasis. LAB  No lab exists for component: [place]   Recent Labs      06/16/17   0300  06/15/17   2000  06/15/17   1600  06/15/17   1110  06/14/17   1809   WBC  7.7   --    --   9.5  7.3   HGB  9.3*  10.1*  10.2*  9.0*  13.7   HCT  28.3*  30.0*  30.6*  27.1*  39.6*   PLT  135*   --    --   156  259   INR   --    --    --   1.5*  1.0     Recent Labs      06/16/17   0300  06/15/17   2000  06/15/17   1600  06/15/17   1215   06/14/17   1809   NA  151*   --    --   145   --   141   K  4.5  4.1  4.6  4.6   --   3.8   CL  119*   --    --   114*   --   104   CO2  25   --    --   23   --   26   GLU  100   --    --   103*   --   142*   BUN  16   --    --   13   --   13   CREA  1.46   --    --   1.18   --   1.31   MG  3.0*  3.2*  3.2*  4.0*   < >   --    CA  7.8*   --    --   8.1*   --   8.4   ALB   --    --    --    --    --   3.3   SGOT   --    --    --    --    --   24    < > = values in this interval not displayed.      Recent Labs      06/15/17   1819  06/15/17   1750  06/15/17   1651   PH  7.32*  7.29*  7.26*   PCO2  48*  44  48*   PO2  95  98  87   HCO3  24  21*  21*       Assessment:  (Medical Decision Making)     Hospital Problems  Date Reviewed: 6/15/2017          Codes Class Noted POA    Hypernatremia ICD-10-CM: E87.0  ICD-9-CM: 276.0  6/16/2017 Unknown        Atelectasis ICD-10-CM: J98.11  ICD-9-CM: 518.0  6/16/2017 Unknown        * (Principal)CAD (coronary artery disease) (Chronic) ICD-10-CM: I25.10  ICD-9-CM: 414.00  6/15/2017 Yes        S/P CABG x 2 ICD-10-CM: Z95.1  ICD-9-CM: V45.81 6/15/2017 No    Overview Signed 6/15/2017 12:25 PM by Lazaro Esteves NP     6/15/17 Dr. Mando Ramires (CABG X 2)/ LIMA to LAD, RSVG to OM     L Greater saphenous VEIN HARVEST             Hypoxia ICD-10-CM: R09.02  ICD-9-CM: 799.02  6/15/2017 No        Unstable angina ([facility] [address] [address] ICD-10-CM: I20.0  ICD-9-CM: 411.1  6/14/2017 Unknown            Patient successfully weaned off vent. Na climbing this morning. Currently on D5 1/2. Plan:  (Medical Decision Making)   -continue to wean o2 as tolerated  -monitor Na. Can change to D5W if needed.   -mobilize and IS when able.        Qing Hayes MD

## 2017-06-16 NOTE — PROGRESS NOTES
6/16/2017 7:33 AM    Admit Date: 6/14/2017    Admit Diagnosis: Atherosclerosis of native coronary artery of native heart w*      Subjective:    Patient sp cabg. Doing well.     Objective:      Visit Vitals    /62    Pulse 78    Temp 98.1 °F (36.7 °C)    Resp 13    Ht 5' 6\" (1.676 m)    Wt 86.4 kg (190 lb 7.6 oz)    SpO2 99%    BMI 30.74 kg/m2       ROS:  General ROS: negative for - chills  Hematological and Lymphatic ROS: negative for - blood clots or jaundice  Respiratory ROS: no cough, shortness of breath, or wheezing  Cardiovascular ROS: no chest pain or dyspnea on exertion  Gastrointestinal ROS: no abdominal pain, change in bowel habits, or black or bloody stools  Neurological ROS: no TIA or stroke symptoms    Physical Exam:    Physical Examination: General appearance - alert, well appearing, and in no distress  Mental status - alert, oriented to person, place, and time  Eyes - pupils equal and reactive, extraocular eye movements intact  Neck/lymph - supple, no significant adenopathy  Chest/CV - clear to auscultation, no wheezes, rales or rhonchi, symmetric air entry  Heart - normal rate, regular rhythm, normal S1, S2, no murmurs, rubs, clicks or gallops  Abdomen/GI - soft, nontender, nondistended, no masses or organomegaly  Musculoskeletal - no joint tenderness, deformity or swelling  Extremities - peripheral pulses normal, no pedal edema, no clubbing or cyanosis  Skin - normal coloration and turgor, no rashes, no suspicious skin lesions noted    Current Facility-Administered Medications   Medication Dose Route Frequency    dextrose 5% - 0.45% NaCl with KCl 20 mEq/L infusion  50 mL/hr IntraVENous CONTINUOUS    sodium chloride (NS) flush 5-10 mL  5-10 mL IntraVENous Q8H    sodium chloride (NS) flush 5-10 mL  5-10 mL IntraVENous PRN    traMADol (ULTRAM) tablet 50 mg  50 mg Oral Q6H PRN    oxyCODONE-acetaminophen (PERCOCET 10)  mg per tablet 1 Tab  1 Tab Oral Q4H PRN    morphine injection 3-5 mg  3-5 mg IntraVENous Q1H PRN    naloxone (NARCAN) injection 0.4 mg  0.4 mg IntraVENous PRN    mupirocin (BACTROBAN) 2 % ointment   Both Nostrils BID    ceFAZolin in 0.9% NS (ANCEF) IVPB soln 2 g  2 g IntraVENous Q8H    sodium bicarbonate 8.4 % (1 mEq/mL) injection 50 mEq  50 mEq IntraVENous PRN    DOBUTamine (DOBUTREX) 500 mg/250 mL (2,000 mcg/mL) infusion  2.5-10 mcg/kg/min IntraVENous TITRATE    EPINEPHrine (ADRENALIN) 4,000 mcg in 0.9% sodium chloride 250 mL infusion  0.05-0.1 mcg/kg/min IntraVENous TITRATE    nitroglycerin (Tridil) 200 mcg/ml infusion   mcg/min IntraVENous TITRATE    PHENYLephrine (NEOSYNEPHRINE) 30,000 mcg in 0.9% sodium chloride 250 mL infusion   mcg/min IntraVENous TITRATE    lidocaine (PF) (XYLOCAINE) 100 mg/5 mL (2 %) injection syringe  mg   mg IntraVENous ONCE PRN    amiodarone (CORDARONE) tablet 400 mg  400 mg Oral Q12H    metoprolol tartrate (LOPRESSOR) tablet 12.5 mg  12.5 mg Oral BID    atorvastatin (LIPITOR) tablet 40 mg  40 mg Oral QPM    ondansetron (ZOFRAN) injection 4 mg  4 mg IntraVENous Q4H PRN    insulin regular (NOVOLIN R, HUMULIN R) 100 Units in 0.9% sodium chloride 100 mL infusion  1 Units/hr IntraVENous TITRATE    dextrose (D50W) injection syrg 12.5 g  25 mL IntraVENous PRN    aspirin chewable tablet 81 mg  81 mg Oral DAILY    magnesium sulfate 1 g/100 ml IVPB (premix or compounded)  1 g IntraVENous PRN    potassium chloride 10 mEq in 50 ml IVPB  10 mEq IntraVENous PRN    midazolam (VERSED) injection 1 mg  1 mg IntraVENous Q1H PRN    propofol (DIPRIVAN) infusion  5-50 mcg/kg/min IntraVENous CONTINUOUS    meperidine (DEMEROL) injection 25 mg  25 mg IntraVENous Q1H PRN    chlorhexidine (PERIDEX) 0.12 % mouthwash 10 mL  10 mL Oral BID    albumin human 5% (BUMINATE) solution    CONTINUOUS    ceFAZolin (ANCEF) 1 g in sodium chloride irrigation 0.9 % 1,000 mL solution    PRN    papaverine injection    PRN    vancomycin (VANCOCIN) injection    PRN    niCARdipine (CARDENE) 25 mg in 0.9% sodium chloride 250 mL infusion  5-15 mg/hr IntraVENous TITRATE    opium-belladonna (B&O 16-A SUPPRETTE) 16.2-60 mg suppository 1 Suppository  1 Suppository Rectal Q8H PRN       Data Review:   @LABRCNT(Na,K,BUN,CREA,WBC,HGB,HCT,PLT,INR,TRP,TCHOL*,Triglyceride*,LDL*,LDLCPOC HDL*,HDL])@    TELEMETRY: nsr    Assessment/Plan:     Principal Problem:    CAD (coronary artery disease) (6/15/2017) The current medical regimen is effective;  continue present plan and medications. Active Problems:    Unstable angina (HCC) (6/14/2017)      S/P CABG x 2 (6/15/2017) The current medical regimen is effective;  continue present plan and medications.         Overview: 6/15/17 Dr. Mario Gongora (CABG X 2)/ LIMA to LAD, RSVG to OM             L Greater saphenous VEIN HARVEST      Hypoxia (6/15/2017)      Hypernatremia (6/16/2017) monitor      Atelectasis (6/16/2017)          Elvis Burleson MD

## 2017-06-17 ENCOUNTER — APPOINTMENT (OUTPATIENT)
Dept: GENERAL RADIOLOGY | Age: 77
DRG: 234 | End: 2017-06-17
Attending: THORACIC SURGERY (CARDIOTHORACIC VASCULAR SURGERY)
Payer: MEDICARE

## 2017-06-17 LAB
ANION GAP BLD CALC-SCNC: 8 MMOL/L (ref 7–16)
BUN SERPL-MCNC: 22 MG/DL (ref 8–23)
CALCIUM SERPL-MCNC: 8.5 MG/DL (ref 8.3–10.4)
CHLORIDE SERPL-SCNC: 111 MMOL/L (ref 98–107)
CO2 SERPL-SCNC: 25 MMOL/L (ref 21–32)
CREAT SERPL-MCNC: 1.28 MG/DL (ref 0.8–1.5)
ERYTHROCYTE [DISTWIDTH] IN BLOOD BY AUTOMATED COUNT: 15.1 % (ref 11.9–14.6)
GLUCOSE BLD STRIP.AUTO-MCNC: 109 MG/DL (ref 65–100)
GLUCOSE BLD STRIP.AUTO-MCNC: 151 MG/DL (ref 65–100)
GLUCOSE BLD STRIP.AUTO-MCNC: 154 MG/DL (ref 65–100)
GLUCOSE BLD STRIP.AUTO-MCNC: 186 MG/DL (ref 65–100)
GLUCOSE SERPL-MCNC: 138 MG/DL (ref 65–100)
HCT VFR BLD AUTO: 30.5 % (ref 41.1–50.3)
HGB BLD-MCNC: 9.8 G/DL (ref 13.6–17.2)
MAGNESIUM SERPL-MCNC: 2.8 MG/DL (ref 1.8–2.4)
MCH RBC QN AUTO: 29.5 PG (ref 26.1–32.9)
MCHC RBC AUTO-ENTMCNC: 32.1 G/DL (ref 31.4–35)
MCV RBC AUTO: 91.9 FL (ref 79.6–97.8)
MM INDURATION POC: 0 MM (ref 0–5)
MM INDURATION POC: 0 MM (ref 0–5)
PLATELET # BLD AUTO: 139 K/UL (ref 150–450)
PMV BLD AUTO: 9.5 FL (ref 10.8–14.1)
POTASSIUM SERPL-SCNC: 4.8 MMOL/L (ref 3.5–5.1)
PPD POC: NORMAL NEGATIVE
PPD POC: NORMAL NEGATIVE
RBC # BLD AUTO: 3.32 M/UL (ref 4.23–5.67)
SODIUM SERPL-SCNC: 144 MMOL/L (ref 136–145)
WBC # BLD AUTO: 11.1 K/UL (ref 4.3–11.1)

## 2017-06-17 PROCEDURE — 74011636637 HC RX REV CODE- 636/637: Performed by: THORACIC SURGERY (CARDIOTHORACIC VASCULAR SURGERY)

## 2017-06-17 PROCEDURE — 80048 BASIC METABOLIC PNL TOTAL CA: CPT | Performed by: THORACIC SURGERY (CARDIOTHORACIC VASCULAR SURGERY)

## 2017-06-17 PROCEDURE — 97161 PT EVAL LOW COMPLEX 20 MIN: CPT

## 2017-06-17 PROCEDURE — 82962 GLUCOSE BLOOD TEST: CPT

## 2017-06-17 PROCEDURE — 74011250637 HC RX REV CODE- 250/637: Performed by: NURSE PRACTITIONER

## 2017-06-17 PROCEDURE — 74011000250 HC RX REV CODE- 250: Performed by: NURSE PRACTITIONER

## 2017-06-17 PROCEDURE — 71020 XR CHEST PA LAT: CPT

## 2017-06-17 PROCEDURE — 97530 THERAPEUTIC ACTIVITIES: CPT

## 2017-06-17 PROCEDURE — 65660000004 HC RM CVT STEPDOWN

## 2017-06-17 PROCEDURE — 77010033678 HC OXYGEN DAILY

## 2017-06-17 PROCEDURE — 74011250636 HC RX REV CODE- 250/636: Performed by: THORACIC SURGERY (CARDIOTHORACIC VASCULAR SURGERY)

## 2017-06-17 PROCEDURE — 85027 COMPLETE CBC AUTOMATED: CPT | Performed by: THORACIC SURGERY (CARDIOTHORACIC VASCULAR SURGERY)

## 2017-06-17 PROCEDURE — 99232 SBSQ HOSP IP/OBS MODERATE 35: CPT | Performed by: INTERNAL MEDICINE

## 2017-06-17 PROCEDURE — 94640 AIRWAY INHALATION TREATMENT: CPT

## 2017-06-17 PROCEDURE — 36415 COLL VENOUS BLD VENIPUNCTURE: CPT | Performed by: THORACIC SURGERY (CARDIOTHORACIC VASCULAR SURGERY)

## 2017-06-17 PROCEDURE — 77030012891

## 2017-06-17 PROCEDURE — 74011250637 HC RX REV CODE- 250/637: Performed by: THORACIC SURGERY (CARDIOTHORACIC VASCULAR SURGERY)

## 2017-06-17 PROCEDURE — 97116 GAIT TRAINING THERAPY: CPT

## 2017-06-17 PROCEDURE — 83735 ASSAY OF MAGNESIUM: CPT | Performed by: THORACIC SURGERY (CARDIOTHORACIC VASCULAR SURGERY)

## 2017-06-17 PROCEDURE — 94760 N-INVAS EAR/PLS OXIMETRY 1: CPT

## 2017-06-17 RX ORDER — ALBUTEROL SULFATE 0.83 MG/ML
2.5 SOLUTION RESPIRATORY (INHALATION)
Status: DISCONTINUED | OUTPATIENT
Start: 2017-06-17 | End: 2017-06-18

## 2017-06-17 RX ADMIN — FERROUS SULFATE TAB 325 MG (65 MG ELEMENTAL FE) 325 MG: 325 (65 FE) TAB at 09:11

## 2017-06-17 RX ADMIN — Medication 10 ML: at 20:41

## 2017-06-17 RX ADMIN — BISACODYL 10 MG: 5 TABLET, COATED ORAL at 20:38

## 2017-06-17 RX ADMIN — STANDARDIZED SENNA CONCENTRATE AND DOCUSATE SODIUM 2 TABLET: 8.6; 5 TABLET, FILM COATED ORAL at 17:07

## 2017-06-17 RX ADMIN — MUPIROCIN: 20 OINTMENT TOPICAL at 09:10

## 2017-06-17 RX ADMIN — FAMOTIDINE 20 MG: 20 TABLET ORAL at 09:11

## 2017-06-17 RX ADMIN — Medication 10 ML: at 02:08

## 2017-06-17 RX ADMIN — Medication 10 ML: at 13:12

## 2017-06-17 RX ADMIN — INSULIN HUMAN 5 UNITS: 100 INJECTION, SOLUTION PARENTERAL at 13:00

## 2017-06-17 RX ADMIN — INSULIN HUMAN 5 UNITS: 100 INJECTION, SOLUTION PARENTERAL at 09:11

## 2017-06-17 RX ADMIN — ALBUTEROL SULFATE 2.5 MG: 2.5 SOLUTION RESPIRATORY (INHALATION) at 20:11

## 2017-06-17 RX ADMIN — STANDARDIZED SENNA CONCENTRATE AND DOCUSATE SODIUM 2 TABLET: 8.6; 5 TABLET, FILM COATED ORAL at 09:12

## 2017-06-17 RX ADMIN — INSULIN HUMAN 5 UNITS: 100 INJECTION, SOLUTION PARENTERAL at 17:07

## 2017-06-17 RX ADMIN — ALBUTEROL SULFATE 2.5 MG: 2.5 SOLUTION RESPIRATORY (INHALATION) at 15:27

## 2017-06-17 RX ADMIN — METOPROLOL TARTRATE 25 MG: 25 TABLET, FILM COATED ORAL at 09:11

## 2017-06-17 RX ADMIN — FUROSEMIDE 40 MG: 40 TABLET ORAL at 09:12

## 2017-06-17 RX ADMIN — MUPIROCIN: 20 OINTMENT TOPICAL at 17:07

## 2017-06-17 RX ADMIN — AMIODARONE HYDROCHLORIDE 400 MG: 200 TABLET ORAL at 09:12

## 2017-06-17 RX ADMIN — CEFAZOLIN 2 G: 1 INJECTION, POWDER, FOR SOLUTION INTRAMUSCULAR; INTRAVENOUS; PARENTERAL at 02:03

## 2017-06-17 RX ADMIN — POTASSIUM CHLORIDE 20 MEQ: 20 TABLET, EXTENDED RELEASE ORAL at 09:11

## 2017-06-17 RX ADMIN — ALBUTEROL SULFATE 2.5 MG: 2.5 SOLUTION RESPIRATORY (INHALATION) at 11:36

## 2017-06-17 RX ADMIN — ASPIRIN 81 MG: 81 TABLET, COATED ORAL at 09:12

## 2017-06-17 RX ADMIN — TRAMADOL HYDROCHLORIDE 50 MG: 50 TABLET, FILM COATED ORAL at 09:11

## 2017-06-17 RX ADMIN — ATORVASTATIN CALCIUM 40 MG: 40 TABLET, FILM COATED ORAL at 17:06

## 2017-06-17 RX ADMIN — METOPROLOL TARTRATE 25 MG: 25 TABLET, FILM COATED ORAL at 20:38

## 2017-06-17 RX ADMIN — AMIODARONE HYDROCHLORIDE 400 MG: 200 TABLET ORAL at 20:38

## 2017-06-17 NOTE — PROGRESS NOTES
Problem: Mobility Impaired (Adult and Pediatric)  Goal: *Acute Goals and Plan of Care (Insert Text)    LTG:  (1.)Mr. Cabezas will move from supine to sit and sit to supine , scoot up and down and roll side to side in bed with SUPERVISION within 7 day(s). (2.)Mr. Cabezas will transfer from bed to chair and chair to bed with INDEPENDENT using the least restrictive device within 7 day(s). (3.)Mr. Cabezas will ambulate with SUPERVISION for 250+ feet with the least restrictive device within 7 day(s). ________________________________________________________________________________________________      PHYSICAL THERAPY: INITIAL ASSESSMENT, TREATMENT DAY: DAY OF ASSESSMENT, AM 6/17/2017  INPATIENT: Hospital Day: 4  Payor: [employer] / Plan: Patrice Lagos / Product Type: [facility] [employer] Medicare /      NAME/AGE/GENDER: Kerrie Good is a 68 y.o. male        PRIMARY DIAGNOSIS: Atherosclerosis of native coronary artery of native heart with unstable angina pectoris ([facility] [I25.110] CAD (coronary artery disease) CAD (coronary artery disease)  Procedure(s) (LRB):  CORONARY ARTERY BYPASS GRAFT (CABG X 2)/ LIMA  (N/A)  VEIN HARVEST (Left)  ESOPHAGEAL TRANS ECHOCARDIOGRAM (N/A)  2 Days Post-Op  ICD-10: Treatment Diagnosis:       · Generalized Muscle Weakness (M62.81)  · Difficulty in walking, Not elsewhere classified (R26.2)   Precaution/Allergies:  Review of patient's allergies indicates no known allergies. ASSESSMENT:      Mr. Shelly Meza presents in sitting at arrival. His impairments include decreased functional mobility, decreased balance, decreased strength, decreased safety awareness, increased risk for falls. Pt would benefit from further PT while in house to address these impairments to help improve to prior level of independence. Pt was able to stand and ambulate with Terri not using RW. Will probably need RW a few visits, but needs to wean off prior to discharge.        PM note: pt in supine at arrival finishing with RT. Improved balance and consciousness with session. He was ab le to ambulate and used RW this session. Pt did well enough to attempt ambulation with no AD next visit and assess from there. Progressing towards goals. Cont with PT. This section established at most recent assessment   PROBLEM LIST (Impairments causing functional limitations):  1. Decreased Strength  2. Decreased ADL/Functional Activities  3. Decreased Transfer Abilities  4. Decreased Ambulation Ability/Technique  5. Decreased Balance  6. Decreased Activity Tolerance    INTERVENTIONS PLANNED: (Benefits and precautions of physical therapy have been discussed with the patient.)  1. Balance Exercise  2. Bed Mobility  3. Gait Training  4. Therapeutic Activites  5. Transfer Training      TREATMENT PLAN: Frequency/Duration: twice daily for duration of hospital stay  Rehabilitation Potential For Stated Goals: Kike Adams REHABILITATION/EQUIPMENT: (at time of discharge pending progress): Continue Skilled Therapy. HISTORY:   History of Present Injury/Illness (Reason for Referral):  Date of Procedure: 6/15/2017   Preoperative Diagnosis: Atherosclerosis of native coronary artery of native heart with unstable angina pectoris ([facility]) [I25.110]  Postoperative Diagnosis: Atherosclerosis of native coronary artery of native heart with unstable angina pectoris ([facility] [I25.110]   Procedure(s):  LYSIS OF ADHESIONS  CORONARY ARTERY BYPASS GRAFT (CABG X 2)/ JEAN-BAPTISTE   LIMA to LAD, RSVG to OM     L Greater saphenous VEIN HARVEST  ESOPHAGEAL TRANS ECHOCARDIOGRAM  Past Medical History/Comorbidities:   Mr. 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 He also has no past medical history of Arrhythmia; Arthritis; Asthma; Autoimmune disease ([facility] Cancer ([facility] Chronic kidney disease; Chronic obstructive pulmonary disease ([facility] Diabetes ([facility] Difficult intubation; GERD (gastroesophageal reflux disease); Heart failure ([facility] Liver disease; Malignant hyperthermia due to anesthesia; Nausea & vomiting; Pseudocholinesterase deficiency; Psychiatric disorder; PUD (peptic ulcer disease); Seizures ([facility] Sleep apnea; Stroke [facility]); Thromboembolus ([facility] or Thyroid disease. Mr. Rachell Toussaint  has a past surgical history that includes hernia repair; other surgical; and carotid endarterectomy (Right, 2012). Social History/Living Environment:   Home Environment: Private residence  One/Two Story Residence: One story  Living Alone: No  Support Systems: Spouse/Significant Other/Partner, Family member(s)  Patient Expects to be Discharged to[de-identified] Private residence  Current DME Used/Available at Home: None  Prior Level of Function/Work/Activity:  Completely independent, no DME used. Number of Personal Factors/Comorbidities that affect the Plan of Care: 1-2: MODERATE COMPLEXITY   EXAMINATION:   Most Recent Physical Functioning:   Gross Assessment:  AROM: Generally decreased, functional  Strength: Generally decreased, functional               Posture:  Posture (WDL): Exceptions to WDL  Posture Assessment: Trunk flexion  Balance:  Sitting: Intact  Standing: Impaired  Standing - Static: Good  Standing - Dynamic : Fair Bed Mobility:  Rolling: Minimum assistance  Supine to Sit: Minimum assistance  Scooting: Minimum assistance  Wheelchair Mobility:     Transfers:  Sit to Stand: Contact guard assistance  Stand to Sit: Contact guard assistance  Gait:     Base of Support: Widened  Speed/Sherin: Shuffled;Pace decreased (<100 feet/min)  Gait Abnormalities: Shuffling gait; Decreased step clearance  Distance (ft): 100 Feet (ft)  Assistive Device: Walker, rolling  Ambulation - Level of Assistance: Contact guard assistance       Body Structures Involved:  1. Heart  2. Lungs  3. Bones  4. Joints  5. Muscles  6. Ligaments Body Functions Affected:  1. Mental  2. Cardio  3. Respiratory  4. Neuromusculoskeletal  5. Movement Related Activities and Participation Affected:  1. Learning and Applying Knowledge  2. General Tasks and Demands  3. Communication  4. Mobility  5. Self Care  6. Domestic Life   Number of elements that affect the Plan of Care: 3: MODERATE COMPLEXITY   CLINICAL PRESENTATION:   Presentation: Evolving clinical presentation with changing clinical characteristics: MODERATE COMPLEXITY   CLINICAL DECISION MAKIN [facility] Mobility Inpatient Short Form  How much difficulty does the patient currently have. .. Unable A Lot A Little None   1. Turning over in bed (including adjusting bedclothes, sheets and blankets)? [ ] 1   [ ] 2   [X] 3   [ ] 4   2. Sitting down on and standing up from a chair with arms ( e.g., wheelchair, bedside commode, etc.)   [ ] 1   [ ] 2   [X] 3   [ ] 4   3. Moving from lying on back to sitting on the side of the bed? [ ] 1   [ ] 2   [X] 3   [ ] 4   How much help from another person does the patient currently need. .. Total A Lot A Little None   4. Moving to and from a bed to a chair (including a wheelchair)? [ ] 1   [ ] 2   [X] 3   [ ] 4   5. Need to walk in hospital room? [ ] 1   [ ] 2   [X] 3   [ ] 4   6. Climbing 3-5 steps with a railing? [ ] 1   [ ] 2   [X] 3   [ ] 4   © , Trustees of [address] [place] [address], under license to [employer]. All rights reserved    Score:  Initial: 18 Most Recent: X (Date: -- )     Interpretation of Tool:  Represents activities that are increasingly more difficult (i.e. Bed mobility, Transfers, Gait).        Score 24 23 22-20 19-15 14-10 9-7 6       Modifier CH CI CJ CK CL CM CN · Mobility - Walking and Moving Around:               - CURRENT STATUS:    CK - 40%-59% impaired, limited or restricted               - GOAL STATUS:           CK - 40%-59% impaired, limited or restricted               - D/C STATUS:                       ---------------To be determined---------------  Payor: [employer] / Plan: Madelin Armando / Product Type: Managed Care Medicare /       Medical Necessity:     · Skilled intervention continues to be required due to decreased function. Reason for Services/Other Comments:  · Patient continues to require skilled intervention due to medical complications and patient unable to attend/participate in therapy as expected. Use of outcome tool(s) and clinical judgement create a POC that gives a: Questionable prediction of patient's progress: MODERATE COMPLEXITY                 TREATMENT:   (In addition to Assessment/Re-Assessment sessions the following treatments were rendered)   Pre-treatment Symptoms/Complaints:  OK  Pain: Initial:   Pain Intensity 1: 0  Post Session:  same      Therapeutic Activity: (    10min):  Therapeutic activities including Bed transfers, Chair transfers and Ambulation on level ground to improve mobility, strength, balance and coordination. Required minimal   to promote static and dynamic balance in standing, promote coordination of bilateral, lower extremity(s) and promote motor control of bilateral, lower extremity(s). Gait Training ( 10min):  Gait training to improve and/or restore physical functioning as related to mobility and strength. Ambulated 100 Feet (ft) with minimal assistance/contact guard assist and moderate verbal cues related to their stance phase, stride length, push off and heel striketo promote proper body alignment, promote proper body posture, promote proper body mechanics and promote proper body breathing techniques.   .    Assistive Device: Walker, rolling  Ambulation - Level of Assistance: Contact guard assistance  Distance (ft): 100 Feet (ft)             Braces/Orthotics/Lines/Etc:   · O2 Device: Nasal cannula  Treatment/Session Assessment:    · Response to Treatment:  See above  · Interdisciplinary Collaboration:  · Physical Therapist  · Registered Nurse  · After treatment position/precautions:  · Up in chair  · Call light within reach  · RN notified  · Compliance with Program/Exercises: Will assess as treatment progresses. · Recommendations/Intent for next treatment session: \"Next visit will focus on advancements to more challenging activities and reduction in assistance provided\".   Total Treatment Duration:  PT Patient Time In/Time Out  Time In: 1500  Time Out: 605 Angel Zhu DPT

## 2017-06-17 NOTE — PROGRESS NOTES
Patient Spo2 86% on room air sitting in recliner. Oxygen replaced at 1 liter nasal canula with immediate SPO2 increase 92%.

## 2017-06-17 NOTE — PROGRESS NOTES
Bedside shift report given to Minnie Hagan RN (oncoming nurse) by Jt Petty RN (offgoing nurse). Bedside shift report included the following information: SBAR, Kardex, ED Summary, MAR, and Recent Results.

## 2017-06-17 NOTE — PROGRESS NOTES
Bedside shift report received from Minnie Hagan [facility] (offgoing nurse). Report given to Jt Petty RN. Vital signs stable. No complaints noted. Patient in stable condition.

## 2017-06-17 NOTE — PROGRESS NOTES
[facility] CARDIOLOGY PROGRESS NOTE           6/17/2017 11:52 AM    Admit Date: 6/14/2017      Subjective:   No complaints     ROS:  Cardiovascular:  As noted above    Objective:      Vitals:    06/17/17 0409 06/17/17 0822 06/17/17 1117 06/17/17 1138   BP:  181/71 140/58    Pulse:  61 (!) 57    Resp:  18 18    Temp:  97.3 °F (36.3 °C) 97.4 °F (36.3 °C)    SpO2:  94% 95% 96%   Weight: 83.4 kg (183 lb 12.8 oz)      Height: 5' 6\" (1.676 m)          Physical Exam   Constitutional: He is oriented to person, place, and time. He appears well-developed. Eyes: Pupils are equal, round, and reactive to light. Neck: Normal range of motion. Cardiovascular:   No murmur heard. Pulmonary/Chest: Effort normal.   Abdominal: Soft. Musculoskeletal: Normal range of motion. Neurological: He is alert and oriented to person, place, and time. Skin: Skin is warm. Data Review:   Recent Labs      06/17/17   0520  06/16/17   0300   06/15/17   1110  06/14/17   1809   NA  144  151*   < >   --   141   K  4.8  4.5   < >   --   3.8   MG  2.8*  3.0*   < >   --    --    BUN  22  16   < >   --   13   CREA  1.28  1.46   < >   --   1.31   GLU  138*  100   < >   --   142*   WBC  11.1  7.7   --   9.5  7.3   HGB  9.8*  9.3*   < >  9.0*  13.7   HCT  30.5*  28.3*   < >  27.1*  39.6*   PLT  139*  135*   --   156  259   INR   --    --    --   1.5*  1.0   CHOL   --    --    --    --   156   LDLC   --    --    --    --   41.2   HDL   --    --    --    --   49    < > = values in this interval not displayed.        Intake/Output Summary (Last 24 hours) at 06/17/17 1152  Last data filed at 06/17/17 2238   Gross per 24 hour   Intake              665 ml   Output              900 ml   Net             -235 ml     Current Facility-Administered Medications   Medication Dose Route Frequency    albuterol (PROVENTIL VENTOLIN) nebulizer solution 2.5 mg  2.5 mg Nebulization QID RT    metoprolol tartrate (LOPRESSOR) tablet 25 mg  25 mg Oral Q12H    senna-docusate (PERICOLACE) 8.6-50 mg per tablet 2 Tab  2 Tab Oral BID    sodium chloride (NS) flush 5-10 mL  5-10 mL IntraVENous Q8H    sodium chloride (NS) flush 5-10 mL  5-10 mL IntraVENous PRN    furosemide (LASIX) tablet 40 mg  40 mg Oral DAILY    alum-mag hydroxide-simeth (MYLANTA) oral suspension 30 mL  30 mL Oral Q4H PRN    acetaminophen (TYLENOL) tablet 650 mg  650 mg Oral Q4H PRN    insulin regular (NOVOLIN R, HUMULIN R) injection   SubCUTAneous AC&HS    mupirocin (BACTROBAN) 2 % ointment   Both Nostrils BID    aspirin delayed-release tablet 81 mg  81 mg Oral DAILY    magnesium oxide (MAG-OX) tablet 400 mg  400 mg Oral TID PRN    magnesium oxide (MAG-OX) tablet 400 mg  400 mg Oral QID PRN    potassium chloride (K-DUR, KLOR-CON) SR tablet 20 mEq  20 mEq Oral DAILY    potassium chloride (K-DUR, KLOR-CON) SR tablet 20 mEq  20 mEq Oral BID PRN    potassium chloride (K-DUR, KLOR-CON) SR tablet 40 mEq  40 mEq Oral BID PRN    oxyCODONE-acetaminophen (PERCOCET) 5-325 mg per tablet 1 Tab  1 Tab Oral Q4H PRN    ferrous sulfate tablet 325 mg  1 Tab Oral DAILY WITH BREAKFAST    famotidine (PEPCID) tablet 20 mg  20 mg Oral DAILY    ondansetron (ZOFRAN) injection 4 mg  4 mg IntraVENous Q4H PRN    bisacodyl (DULCOLAX) tablet 10 mg  10 mg Oral DAILY PRN    traMADol (ULTRAM) tablet 50 mg  50 mg Oral Q6H PRN    amiodarone (CORDARONE) tablet 400 mg  400 mg Oral Q12H    atorvastatin (LIPITOR) tablet 40 mg  40 mg Oral QPM         Assessment/Plan:     1. Coronary artery disease symptoms currently stable coronary artery bypass grafting with LIMA to LAD and SVG to OM. Patient had nonobstructive disease in right coronary artery. Normal left ventricular systolic function  2. Hypoxia improved pulmonary following  3. Hypernatremia sodium 151 6/16/2017 within normal limits 6/17/2017  4. Thrombocytopenia platelets have dropped since admission but are now stable continue to evaluate. Patient does not appear to be on heparin at this time. 5. Antiarrhythmic drug therapy patient on amiodarone 400 twice a day. Plan for decreased dose and discontinuation following discharge.  In sinus rhythm 6/17      Rahel Hickey MD  6/17/2017 11:52 AM

## 2017-06-17 NOTE — PROGRESS NOTES
Ry Kaur  Admission Date: 6/14/2017             Daily Progress Note: 6/17/2017    The patient's chart is reviewed and the patient is discussed with the staff. 67 yo male with a history of HTN, HL, carotid artery disease s/p CEA. and  CAD. Patient presented with exertional dyspnea improved with rest. [facility] critical left main coronary disease with preserved LVEF. He was admitted to the hospital and placed on heparin drip. He is now s/p CABG x 2 on 6/15/17 per Dr. Ralph Bruno. Subjective:     Currently on o2 at 1 lpm via NC with o2 sat 96%. Occasional cough which is minimally productive. Using IS but only able to draw 250 ml. Incisional pain controlled with pain mediations. Denies palpitations.       Current Facility-Administered Medications   Medication Dose Route Frequency    metoprolol tartrate (LOPRESSOR) tablet 25 mg  25 mg Oral Q12H    senna-docusate (PERICOLACE) 8.6-50 mg per tablet 2 Tab  2 Tab Oral BID    sodium chloride (NS) flush 5-10 mL  5-10 mL IntraVENous Q8H    sodium chloride (NS) flush 5-10 mL  5-10 mL IntraVENous PRN    furosemide (LASIX) tablet 40 mg  40 mg Oral DAILY    alum-mag hydroxide-simeth (MYLANTA) oral suspension 30 mL  30 mL Oral Q4H PRN    acetaminophen (TYLENOL) tablet 650 mg  650 mg Oral Q4H PRN    insulin regular (NOVOLIN R, HUMULIN R) injection   SubCUTAneous AC&HS    mupirocin (BACTROBAN) 2 % ointment   Both Nostrils BID    aspirin delayed-release tablet 81 mg  81 mg Oral DAILY    magnesium oxide (MAG-OX) tablet 400 mg  400 mg Oral TID PRN    magnesium oxide (MAG-OX) tablet 400 mg  400 mg Oral QID PRN    potassium chloride (K-DUR, KLOR-CON) SR tablet 20 mEq  20 mEq Oral DAILY    potassium chloride (K-DUR, KLOR-CON) SR tablet 20 mEq  20 mEq Oral BID PRN    potassium chloride (K-DUR, KLOR-CON) SR tablet 40 mEq  40 mEq Oral BID PRN    oxyCODONE-acetaminophen (PERCOCET) 5-325 mg per tablet 1 Tab  1 Tab Oral Q4H PRN    ferrous sulfate tablet 325 mg  1 Tab Oral DAILY WITH BREAKFAST    famotidine (PEPCID) tablet 20 mg  20 mg Oral DAILY    ondansetron (ZOFRAN) injection 4 mg  4 mg IntraVENous Q4H PRN    bisacodyl (DULCOLAX) tablet 10 mg  10 mg Oral DAILY PRN    traMADol (ULTRAM) tablet 50 mg  50 mg Oral Q6H PRN    amiodarone (CORDARONE) tablet 400 mg  400 mg Oral Q12H    atorvastatin (LIPITOR) tablet 40 mg  40 mg Oral QPM       Review of Systems  Constitutional: negative for fever, chills, sweats  Cardiovascular: negative for chest pain, palpitations, syncope, edema  Gastrointestinal:  negative for dysphagia, reflux, vomiting, diarrhea, abdominal pain, or melena  Neurologic:  negative for focal weakness, numbness, headache    Objective:     Vitals:    06/16/17 2058 06/16/17 2232 06/17/17 0346 06/17/17 0409   BP: 154/67 139/65 150/66    Pulse: 68 68 65    Resp:  16 18    Temp:  97.5 °F (36.4 °C) 98.1 °F (36.7 °C)    SpO2:  97% 96%    Weight:    183 lb 12.8 oz (83.4 kg)   Height:    5' 6\" (1.676 m)     Intake and Output:   06/15 1901 - 06/17 0700  In: [address] [P[address]O.:1645]  Out: 1702 [KTMWF:9338; Drains:25]       Physical Exam:   Constitution:  the patient is well developed and in no acute distress  EENMT:  Sclera clear, pupils equal, oral mucosa moist  Respiratory: diminished bilaterally, O2 at 1 lpm  Cardiovascular:  RRR without M,G,R  Gastrointestinal: soft and non-tender; with positive bowel sounds. Musculoskeletal: warm without cyanosis. There is 1+ lower leg edema.   Skin:  no jaundice or rashes, midline sternal incision without redness, swelling, or drainage   Neurologic: no gross neuro deficits     Psychiatric:  alert and oriented x 3    CXR:   6/16          LAB  Recent Labs      06/17/17   0526  06/16/17   2034  06/16/17   1640  06/16/17   1225  06/16/17   0716   GLUCPOC  154*  201*  190*  169*  131*      Recent Labs      06/17/17   0520  06/16/17   0300  06/15/17   2000  06/15/17   1600  06/15/17   1110  06/14/17   1809   WBC  11.1  7.7 --    --   9.5  7.3   HGB  9.8*  9.3*  10.1*  10.2*  9.0*  13.7   HCT  30.5*  28.3*  30.0*  30.6*  27.1*  39.6*   PLT  139*  135*   --    --   156  259   INR   --    --    --    --   1.5*  1.0     Recent Labs      06/17/17   0520  06/16/17   0300  06/15/17   2000   06/15/17   1215  06/14/17   1809   NA  144  151*   --    --   145  141   K  4.8  4.5  4.1   < >  4.6  3.8   CL  111*  119*   --    --   114*  104   CO2  25  25   --    --   23  26   GLU  138*  100   --    --   103*  142*   BUN  22  16   --    --   13  13   CREA  1.28  1.46   --    --   1.18  1.31   MG  2.8*  3.0*  3.2*   < >  4.0*   --    CA  8.5  7.8*   --    --   8.1*  8.4   ALB   --    --    --    --    --   3.3   TBILI   --    --    --    --    --   0.3   ALT   --    --    --    --    --   35   SGOT   --    --    --    --    --   24    < > = values in this interval not displayed. Recent Labs      06/15/17   1819  06/15/17   1750  06/15/17   1651   PH  7.32*  7.29*  7.26*   PCO2  48*  44  48*   PO2  95  98  87   HCO3  24  21*  21*     No results for input(s): LCAD, LAC in the last 72 hours.       Assessment:  (Medical Decision Making)     Hospital Problems  Date Reviewed: 6/17/2017          Codes Class Noted POA    Hypernatremia ICD-10-CM: E87.0  ICD-9-CM: 276.0  6/16/2017 No    Na+ 144      Atelectasis ICD-10-CM: J98.11  ICD-9-CM: 518.0  6/16/2017 No    Add albuterol IPPB      * (Principal)CAD (coronary artery disease) (Chronic) ICD-10-CM: I25.10  ICD-9-CM: 414.00  6/15/2017 Yes        S/P CABG x 2 ICD-10-CM: Z95.1  ICD-9-CM: V45.81  6/15/2017 No     6/15/17 Dr. Terell Thompson (CABG X 2)/ JEAN-BAPTISTE to LAD, RSVG to OM     L Greater saphenous VEIN HARVEST         Hypoxia ICD-10-CM: R09.02  ICD-9-CM: 799.02  6/15/2017 No    Remains on O2 at 1 lpm      Unstable angina ([facility] [address] ICD-10-CM: I20.0  ICD-9-CM: 411.1  6/14/2017 Yes         BS range 154-201    Plan:  (Medical Decision Making)     --add Albuterol IPPB  --continue to wean o2 as tolerated  --mobilize    More than 50% of the time documented was spent in face-to-face contact with the patient and in the care of the patient on the floor/unit where the patient is located. Jorje Finch NP     Lungs:  Decreased BS  Heart:  RRR with no Murmur/Rubs/Gallops    Additional Comments: Now on RA with sat 90% after first IPPB treatment- continue. OOB encouraged. I have spoken with and examined the patient. I agree with the above assessment and plan as documented.     Paul Lofton MD

## 2017-06-17 NOTE — PROGRESS NOTES
Pacing wires pulled per Dr. Africa Hayes. Patient instructed to 1 hour bedrest with every 15 min blood pressure checks for 1 hour. Patient voices understanding.

## 2017-06-17 NOTE — PROGRESS NOTES
Problem: Mobility Impaired (Adult and Pediatric)  Goal: *Acute Goals and Plan of Care (Insert Text)    LTG:  (1.)Mr. Cabezas will move from supine to sit and sit to supine , scoot up and down and roll side to side in bed with SUPERVISION within 7 day(s). (2.)Mr. Cabezas will transfer from bed to chair and chair to bed with INDEPENDENT using the least restrictive device within 7 day(s). (3.)Mr. Cabezas will ambulate with SUPERVISION for 250+ feet with the least restrictive device within 7 day(s). ________________________________________________________________________________________________      PHYSICAL THERAPY: INITIAL ASSESSMENT, TREATMENT DAY: DAY OF ASSESSMENT, AM 6/17/2017  INPATIENT: Hospital Day: 4  Payor: Beau De Paz / Plan: Shira Goodman / Product Type: NOMER[employer]IL[employer] Care Medicare /      NAME/AGE/GENDER: Miladis Walker is a 68 y.o. male        PRIMARY DIAGNOSIS: Atherosclerosis of native coronary artery of native heart with unstable angina pectoris ([facility] [address] [I25.110] CAD (coronary artery disease) CAD (coronary artery disease)  Procedure(s) (LRB):  CORONARY ARTERY BYPASS GRAFT (CABG X 2)/ LIMA  (N/A)  VEIN HARVEST (Left)  ESOPHAGEAL TRANS ECHOCARDIOGRAM (N/A)  2 Days Post-Op  ICD-10: Treatment Diagnosis:       · Generalized Muscle Weakness (M62.81)  · Difficulty in walking, Not elsewhere classified (R26.2)   Precaution/Allergies:  Review of patient's allergies indicates no known allergies. ASSESSMENT:      Mr. Taco Mcnamara presents in sitting at arrival. His impairments include decreased functional mobility, decreased balance, decreased strength, decreased safety awareness, increased risk for falls. Pt would benefit from further PT while in house to address these impairments to help improve to prior level of independence. Pt was able to stand and ambulate with Terri not using RW. Will probably need RW a few visits, but needs to wean off prior to discharge.              This section established at most recent assessment   PROBLEM LIST (Impairments causing functional limitations):  1. Decreased Strength  2. Decreased ADL/Functional Activities  3. Decreased Transfer Abilities  4. Decreased Ambulation Ability/Technique  5. Decreased Balance  6. Decreased Activity Tolerance    INTERVENTIONS PLANNED: (Benefits and precautions of physical therapy have been discussed with the patient.)  1. Balance Exercise  2. Bed Mobility  3. Gait Training  4. Therapeutic Activites  5. Transfer Training      TREATMENT PLAN: Frequency/Duration: twice daily for duration of hospital stay  Rehabilitation Potential For Stated Goals: [facility] [place] REHABILITATION/EQUIPMENT: (at time of discharge pending progress): Continue Skilled Therapy. HISTORY:   History of Present Injury/Illness (Reason for Referral):  Date of Procedure: 6/15/2017   Preoperative Diagnosis: Atherosclerosis of native coronary artery of native heart with unstable angina pectoris ([facility]) [I25.110]  Postoperative Diagnosis: Atherosclerosis of native coronary artery of native heart with unstable angina pectoris ([facility] [address] [I25.110]   Procedure(s):  LYSIS OF ADHESIONS  CORONARY ARTERY BYPASS GRAFT (CABG X 2)/ JEAN-BAPTISTE   LIMA to LAD, RSVG to OM     L Greater saphenous VEIN HARVEST  ESOPHAGEAL TRANS ECHOCARDIOGRAM  Past Medical History/Comorbidities:   Mr. 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 He also has no past medical history of Arrhythmia; Arthritis; Asthma; Autoimmune disease ([address] Cancer ([address]  Chronic kidney disease; Chronic obstructive pulmonary disease ([address] Diabetes ([facility] [address] Difficult intubation; GERD (gastroesophageal reflux disease); Heart failure ([facility] [address] Liver disease; Malignant hyperthermia due to anesthesia; Nausea & vomiting; Pseudocholinesterase deficiency; Psychiatric disorder; PUD (peptic ulcer disease); Seizures ([facility] [address] Sleep apnea; Stroke [facility]); Thromboembolus ([facility] [address] or Thyroid disease. Mr. Kami Victor  has a past surgical history that includes hernia repair; other surgical; and carotid endarterectomy (Right, 2012). Social History/Living Environment:   Home Environment: Private residence  One/Two Story Residence: One story  Living Alone: No  Support Systems: Spouse/Significant Other/Partner, Family member(s)  Patient Expects to be Discharged to[de-identified] Private residence  Current DME Used/Available at Home: None  Prior Level of Function/Work/Activity:  Completely independent, no DME used. Number of Personal Factors/Comorbidities that affect the Plan of Care: 1-2: MODERATE COMPLEXITY   EXAMINATION:   Most Recent Physical Functioning:   Gross Assessment:  AROM: Generally decreased, functional  Strength: Generally decreased, functional               Posture:  Posture (WDL): Exceptions to WDL  Posture Assessment: Trunk flexion  Balance:  Sitting: Intact  Standing: Impaired  Standing - Static: Good  Standing - Dynamic : Fair Bed Mobility:     Wheelchair Mobility:     Transfers:  Sit to Stand: Contact guard assistance  Stand to Sit: Contact guard assistance  Gait:     Base of Support: Widened  Speed/Sherin: Shuffled  Gait Abnormalities: Shuffling gait; Decreased step clearance  Distance (ft): 20 Feet (ft)  Ambulation - Level of Assistance: Minimal assistance       Body Structures Involved:  1. Heart  2. Lungs  3. Bones  4. Joints  5. Muscles  6. Ligaments Body Functions Affected:  1. Mental  2. Cardio  3. Respiratory  4. Neuromusculoskeletal  5. Movement Related Activities and Participation Affected:  1. Learning and Applying Knowledge  2.  General Tasks and Demands  3. Communication  4. Mobility  5. Self Care  6. Domestic Life   Number of elements that affect the Plan of Care: 3: MODERATE COMPLEXITY   CLINICAL PRESENTATION:   Presentation: Evolving clinical presentation with changing clinical characteristics: MODERATE COMPLEXITY   CLINICAL DECISION MAKIN [facility] Inpatient Short Form  How much difficulty does the patient currently have. .. Unable A Lot A Little None   1. Turning over in bed (including adjusting bedclothes, sheets and blankets)? [ ] 1   [ ] 2   [X] 3   [ ] 4   2. Sitting down on and standing up from a chair with arms ( e.g., wheelchair, bedside commode, etc.)   [ ] 1   [ ] 2   [X] 3   [ ] 4   3. Moving from lying on back to sitting on the side of the bed? [ ] 1   [ ] 2   [X] 3   [ ] 4   How much help from another person does the patient currently need. .. Total A Lot A Little None   4. Moving to and from a bed to a chair (including a wheelchair)? [ ] 1   [ ] 2   [X] 3   [ ] 4   5. Need to walk in hospital room? [ ] 1   [ ] 2   [X] 3   [ ] 4   6. Climbing 3-5 steps with a railing? [ ] 1   [ ] 2   [X] 3   [ ] 4   © , Trustees of [address] [place] [address] [address] [address], under license to [employer]. All rights reserved    Score:  Initial: 18 Most Recent: X (Date: -- )     Interpretation of Tool:  Represents activities that are increasingly more difficult (i.e. Bed mobility, Transfers, Gait).        Score 24 23 22-20 19-15 14-10 9-7 6       Modifier CH CI CJ CK CL CM CN         · Mobility - Walking and Moving Around:               - CURRENT STATUS:    CK - 40%-59% impaired, limited or restricted               - GOAL STATUS:           CK - 40%-59% impaired, limited or restricted               - D/C STATUS:                       ---------------To be determined---------------  Payor: JOSE MEDICARE / Plan: Keara Pelayo / Product Type: Managed Care Medicare /       Medical Necessity: · Skilled intervention continues to be required due to decreased function. Reason for Services/Other Comments:  · Patient continues to require skilled intervention due to medical complications and patient unable to attend/participate in therapy as expected. Use of outcome tool(s) and clinical judgement create a POC that gives a: Questionable prediction of patient's progress: MODERATE COMPLEXITY                 TREATMENT:   (In addition to Assessment/Re-Assessment sessions the following treatments were rendered)   Pre-treatment Symptoms/Complaints:  OK  Pain: Initial:   Pain Intensity 1: 0  Post Session:  same      Therapeutic Activity: (    10min):  Therapeutic activities including Bed transfers, Chair transfers and Ambulation on level ground to improve mobility, strength, balance and coordination. Required minimal   to promote static and dynamic balance in standing, promote coordination of bilateral, lower extremity(s) and promote motor control of bilateral, lower extremity(s). Braces/Orthotics/Lines/Etc:   · O2 Device: Nasal cannula  Treatment/Session Assessment:    · Response to Treatment:  See above  · Interdisciplinary Collaboration:  · Physical Therapist  · Registered Nurse  · After treatment position/precautions:  · Up in chair  · Call light within reach  · RN notified  · Compliance with Program/Exercises: Will assess as treatment progresses. · Recommendations/Intent for next treatment session: \"Next visit will focus on advancements to more challenging activities and reduction in assistance provided\".   Total Treatment Duration:  PT Patient Time In/Time Out  Time In: 1155  Time Out: 15 Gopal Ferro DPT

## 2017-06-17 NOTE — PROGRESS NOTES
Subjective:   No complaint    Objective:     Patient Vitals for the past 8 hrs:   BP Temp Pulse Resp SpO2 Height Weight   17 1138 - - - - 96 % - -   17 1117 140/58 97.4 °F (36.3 °C) (!) 57 18 95 % - -   17 0822 181/71 97.3 °F (36.3 °C) 61 18 94 % - -   17 0409 - - - - - 5' 6\" (1.676 m) 183 lb 12.8 oz (83.4 kg)     Temp (24hrs), Av.7 °F (36.5 °C), Min:97.3 °F (36.3 °C), Max:98.4 °F (36.9 °C)        Heart:  regular rate and rhythm, S1, S2 normal, no murmur, click, rub or gallop  Lung:  clear to auscultation bilaterally   Incisions: Clean, dry, and intact    Labs:  Recent Results (from the past 24 hour(s))   GLUCOSE, POC    Collection Time: 17 12:25 PM   Result Value Ref Range    Glucose (POC) 169 (H) 65 - 100 mg/dL   GLUCOSE, POC    Collection Time: 17  4:40 PM   Result Value Ref Range    Glucose (POC) 190 (H) 65 - 100 mg/dL   GLUCOSE, POC    Collection Time: 17  8:34 PM   Result Value Ref Range    Glucose (POC) 201 (H) 65 - 100 mg/dL   PLEASE READ & DOCUMENT PPD TEST IN 24 HRS    Collection Time: 17  9:09 PM   Result Value Ref Range    PPD Neg Negative    mm Induration 0 mm   METABOLIC PANEL, BASIC    Collection Time: 17  5:20 AM   Result Value Ref Range    Sodium 144 136 - 145 mmol/L    Potassium 4.8 3.5 - 5.1 mmol/L    Chloride 111 (H) 98 - 107 mmol/L    CO2 25 21 - 32 mmol/L    Anion gap 8 7 - 16 mmol/L    Glucose 138 (H) 65 - 100 mg/dL    BUN 22 8 - 23 MG/DL    Creatinine 1.28 0.8 - 1.5 MG/DL    GFR est AA >60 >60 ml/min/1.73m2    GFR est non-AA 58 (L) >60 ml/min/1.73m2    Calcium 8.5 8.3 - 10.4 MG/DL   MAGNESIUM    Collection Time: 17  5:20 AM   Result Value Ref Range    Magnesium 2.8 (H) 1.8 - 2.4 mg/dL   CBC W/O DIFF    Collection Time: 17  5:20 AM   Result Value Ref Range    WBC 11.1 4.3 - 11.1 K/uL    RBC 3.32 (L) 4.23 - 5.67 M/uL    HGB 9.8 (L) 13.6 - 17.2 g/dL    HCT 30.5 (L) 41.1 - 50.3 %    MCV 91.9 79.6 - 97.8 FL    MCH 29.5 26.1 - 32.9 PG    MCHC 32.1 31.4 - 35.0 g/dL    RDW 15.1 (H) 11.9 - 14.6 %    PLATELET 082 (L) 126 - 450 K/uL    MPV 9.5 (L) 10.8 - 14.1 FL   GLUCOSE, POC    Collection Time: 06/17/17  5:26 AM   Result Value Ref Range    Glucose (POC) 154 (H) 65 - 100 mg/dL   GLUCOSE, POC    Collection Time: 06/17/17 11:19 AM   Result Value Ref Range    Glucose (POC) 186 (H) 65 - 100 mg/dL       Assessment:     Principal Problem:    CAD (coronary artery disease) (6/15/2017)    Active Problems:    Unstable angina ([address] (6/14/2017)      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 Kalia Zavala (CABG X 2)/ LIMA to LAD, RSVG to OM             L Greater saphenous VEIN HARVEST      Hypoxia (6/15/2017)      Hypernatremia (6/16/2017)      Atelectasis (6/16/2017)        Plan/Recommendations/Medical Decision Making:     Discontinue:  pacing wires    See orders    Doing well, ambulate, rehab

## 2017-06-18 LAB
ABO + RH BLD: NORMAL
ANION GAP BLD CALC-SCNC: 8 MMOL/L (ref 7–16)
BLD PROD TYP BPU: NORMAL
BLOOD GROUP ANTIBODIES SERPL: NORMAL
BPU ID: NORMAL
BUN SERPL-MCNC: 29 MG/DL (ref 8–23)
CALCIUM SERPL-MCNC: 8.5 MG/DL (ref 8.3–10.4)
CHLORIDE SERPL-SCNC: 108 MMOL/L (ref 98–107)
CO2 SERPL-SCNC: 27 MMOL/L (ref 21–32)
CREAT SERPL-MCNC: 1.33 MG/DL (ref 0.8–1.5)
CROSSMATCH RESULT,%XM: NORMAL
ERYTHROCYTE [DISTWIDTH] IN BLOOD BY AUTOMATED COUNT: 14.8 % (ref 11.9–14.6)
GLUCOSE BLD STRIP.AUTO-MCNC: 134 MG/DL (ref 65–100)
GLUCOSE BLD STRIP.AUTO-MCNC: 136 MG/DL (ref 65–100)
GLUCOSE BLD STRIP.AUTO-MCNC: 142 MG/DL (ref 65–100)
GLUCOSE BLD STRIP.AUTO-MCNC: 154 MG/DL (ref 65–100)
GLUCOSE SERPL-MCNC: 120 MG/DL (ref 65–100)
HCT VFR BLD AUTO: 31.7 % (ref 41.1–50.3)
HGB BLD-MCNC: 10.2 G/DL (ref 13.6–17.2)
MAGNESIUM SERPL-MCNC: 2.5 MG/DL (ref 1.8–2.4)
MCH RBC QN AUTO: 29.4 PG (ref 26.1–32.9)
MCHC RBC AUTO-ENTMCNC: 32.2 G/DL (ref 31.4–35)
MCV RBC AUTO: 91.4 FL (ref 79.6–97.8)
MM INDURATION POC: 0 MM (ref 0–5)
PLATELET # BLD AUTO: 156 K/UL (ref 150–450)
PMV BLD AUTO: 9.8 FL (ref 10.8–14.1)
POTASSIUM SERPL-SCNC: 4.3 MMOL/L (ref 3.5–5.1)
PPD POC: NEGATIVE NEGATIVE
RBC # BLD AUTO: 3.47 M/UL (ref 4.23–5.67)
SODIUM SERPL-SCNC: 143 MMOL/L (ref 136–145)
SPECIMEN EXP DATE BLD: NORMAL
STATUS OF UNIT,%ST: NORMAL
UNIT DIVISION, %UDIV: 0
WBC # BLD AUTO: 14.2 K/UL (ref 4.3–11.1)

## 2017-06-18 PROCEDURE — 82962 GLUCOSE BLOOD TEST: CPT

## 2017-06-18 PROCEDURE — 83735 ASSAY OF MAGNESIUM: CPT | Performed by: THORACIC SURGERY (CARDIOTHORACIC VASCULAR SURGERY)

## 2017-06-18 PROCEDURE — 94640 AIRWAY INHALATION TREATMENT: CPT

## 2017-06-18 PROCEDURE — 99232 SBSQ HOSP IP/OBS MODERATE 35: CPT | Performed by: INTERNAL MEDICINE

## 2017-06-18 PROCEDURE — 74011250637 HC RX REV CODE- 250/637: Performed by: THORACIC SURGERY (CARDIOTHORACIC VASCULAR SURGERY)

## 2017-06-18 PROCEDURE — 74011250636 HC RX REV CODE- 250/636: Performed by: NURSE PRACTITIONER

## 2017-06-18 PROCEDURE — 97110 THERAPEUTIC EXERCISES: CPT

## 2017-06-18 PROCEDURE — 74011250636 HC RX REV CODE- 250/636: Performed by: INTERNAL MEDICINE

## 2017-06-18 PROCEDURE — 74011250637 HC RX REV CODE- 250/637: Performed by: NURSE PRACTITIONER

## 2017-06-18 PROCEDURE — 94760 N-INVAS EAR/PLS OXIMETRY 1: CPT

## 2017-06-18 PROCEDURE — 74011000250 HC RX REV CODE- 250: Performed by: INTERNAL MEDICINE

## 2017-06-18 PROCEDURE — 74011636637 HC RX REV CODE- 636/637: Performed by: THORACIC SURGERY (CARDIOTHORACIC VASCULAR SURGERY)

## 2017-06-18 PROCEDURE — 80048 BASIC METABOLIC PNL TOTAL CA: CPT | Performed by: THORACIC SURGERY (CARDIOTHORACIC VASCULAR SURGERY)

## 2017-06-18 PROCEDURE — 97530 THERAPEUTIC ACTIVITIES: CPT

## 2017-06-18 PROCEDURE — 65660000004 HC RM CVT STEPDOWN

## 2017-06-18 PROCEDURE — 36415 COLL VENOUS BLD VENIPUNCTURE: CPT | Performed by: THORACIC SURGERY (CARDIOTHORACIC VASCULAR SURGERY)

## 2017-06-18 PROCEDURE — 77010033678 HC OXYGEN DAILY

## 2017-06-18 PROCEDURE — 85027 COMPLETE CBC AUTOMATED: CPT | Performed by: THORACIC SURGERY (CARDIOTHORACIC VASCULAR SURGERY)

## 2017-06-18 RX ORDER — ALBUTEROL SULFATE 0.83 MG/ML
2.5 SOLUTION RESPIRATORY (INHALATION)
Status: DISCONTINUED | OUTPATIENT
Start: 2017-06-18 | End: 2017-06-19 | Stop reason: HOSPADM

## 2017-06-18 RX ORDER — AMIODARONE HYDROCHLORIDE 200 MG/1
200 TABLET ORAL EVERY 12 HOURS
Status: DISCONTINUED | OUTPATIENT
Start: 2017-06-18 | End: 2017-06-19 | Stop reason: HOSPADM

## 2017-06-18 RX ORDER — BUDESONIDE 0.5 MG/2ML
500 INHALANT ORAL
Status: DISCONTINUED | OUTPATIENT
Start: 2017-06-18 | End: 2017-06-19 | Stop reason: HOSPADM

## 2017-06-18 RX ADMIN — METHYLPREDNISOLONE SODIUM SUCCINATE 40 MG: 40 INJECTION, POWDER, FOR SOLUTION INTRAMUSCULAR; INTRAVENOUS at 20:23

## 2017-06-18 RX ADMIN — BUDESONIDE 500 MCG: 0.5 SUSPENSION RESPIRATORY (INHALATION) at 20:52

## 2017-06-18 RX ADMIN — ALBUTEROL SULFATE 2.5 MG: 2.5 SOLUTION RESPIRATORY (INHALATION) at 20:52

## 2017-06-18 RX ADMIN — ALBUTEROL SULFATE 2.5 MG: 2.5 SOLUTION RESPIRATORY (INHALATION) at 23:39

## 2017-06-18 RX ADMIN — AMIODARONE HYDROCHLORIDE 400 MG: 200 TABLET ORAL at 08:27

## 2017-06-18 RX ADMIN — POTASSIUM CHLORIDE 20 MEQ: 20 TABLET, EXTENDED RELEASE ORAL at 08:27

## 2017-06-18 RX ADMIN — AMIODARONE HYDROCHLORIDE 200 MG: 200 TABLET ORAL at 20:21

## 2017-06-18 RX ADMIN — Medication 10 ML: at 20:22

## 2017-06-18 RX ADMIN — Medication 10 ML: at 17:14

## 2017-06-18 RX ADMIN — Medication 10 ML: at 02:04

## 2017-06-18 RX ADMIN — METOPROLOL TARTRATE 25 MG: 25 TABLET, FILM COATED ORAL at 08:26

## 2017-06-18 RX ADMIN — ONDANSETRON 4 MG: 2 INJECTION INTRAMUSCULAR; INTRAVENOUS at 02:01

## 2017-06-18 RX ADMIN — ACETAMINOPHEN 650 MG: 325 TABLET, FILM COATED ORAL at 20:21

## 2017-06-18 RX ADMIN — FERROUS SULFATE TAB 325 MG (65 MG ELEMENTAL FE) 325 MG: 325 (65 FE) TAB at 08:26

## 2017-06-18 RX ADMIN — MUPIROCIN: 20 OINTMENT TOPICAL at 08:35

## 2017-06-18 RX ADMIN — INSULIN HUMAN 5 UNITS: 100 INJECTION, SOLUTION PARENTERAL at 11:30

## 2017-06-18 RX ADMIN — MUPIROCIN: 20 OINTMENT TOPICAL at 17:13

## 2017-06-18 RX ADMIN — FUROSEMIDE 40 MG: 40 TABLET ORAL at 08:27

## 2017-06-18 RX ADMIN — BUDESONIDE 500 MCG: 0.5 SUSPENSION RESPIRATORY (INHALATION) at 08:35

## 2017-06-18 RX ADMIN — ALBUTEROL SULFATE 2.5 MG: 2.5 SOLUTION RESPIRATORY (INHALATION) at 08:35

## 2017-06-18 RX ADMIN — METHYLPREDNISOLONE SODIUM SUCCINATE 40 MG: 40 INJECTION, POWDER, FOR SOLUTION INTRAMUSCULAR; INTRAVENOUS at 08:31

## 2017-06-18 RX ADMIN — ASPIRIN 81 MG: 81 TABLET, COATED ORAL at 08:27

## 2017-06-18 RX ADMIN — FAMOTIDINE 20 MG: 20 TABLET ORAL at 08:26

## 2017-06-18 RX ADMIN — METOPROLOL TARTRATE 25 MG: 25 TABLET, FILM COATED ORAL at 20:21

## 2017-06-18 RX ADMIN — ATORVASTATIN CALCIUM 40 MG: 40 TABLET, FILM COATED ORAL at 20:23

## 2017-06-18 RX ADMIN — ALBUTEROL SULFATE 2.5 MG: 2.5 SOLUTION RESPIRATORY (INHALATION) at 11:13

## 2017-06-18 NOTE — PROGRESS NOTES
Problem: Mobility Impaired (Adult and Pediatric)  Goal: *Acute Goals and Plan of Care (Insert Text)    LTG:  (1.)Mr. Cabezas will move from supine to sit and sit to supine , scoot up and down and roll side to side in bed with SUPERVISION within 7 day(s). (2.)Mr. Cabezas will transfer from bed to chair and chair to bed with INDEPENDENT using the least restrictive device within 7 day(s). (3.)Mr. Cabezas will ambulate with SUPERVISION for 250+ feet with the least restrictive device within 7 day(s). ________________________________________________________________________________________________      PHYSICAL THERAPY: Daily Note, Treatment Day: 1st and PM 6/18/2017  INPATIENT: Hospital Day: 5  Payor: Viktor Barrett / Plan: Yesika Burgos / Product Type: [facility] [employer] /      NAME/AGE/GENDER: Jovan Hong is a 68 y.o. male        PRIMARY DIAGNOSIS: Atherosclerosis of native coronary artery of native heart with unstable angina pectoris ([facility] [I25.110] CAD (coronary artery disease) CAD (coronary artery disease)  Procedure(s) (LRB):  CORONARY ARTERY BYPASS GRAFT (CABG X 2)/ LIMA  (N/A)  VEIN HARVEST (Left)  ESOPHAGEAL TRANS ECHOCARDIOGRAM (N/A)  3 Days Post-Op  ICD-10: Treatment Diagnosis:       · Generalized Muscle Weakness (M62.81)  · Difficulty in walking, Not elsewhere classified (R26.2)   Precaution/Allergies:  Review of patient's allergies indicates no known allergies. ASSESSMENT:      Mr. Denver Ouch presents sitting up in bedside chair agreeable to treatment. He increased his ambulation distance with no assistive device and on room air. His O2 sats ranged between 92-96% throughout ambulation. He returned to room and was instructed in seated exercises. He demonstrated good technique with all exercises. Great progress with mobility this treatment. Will continue PT efforts. PM NOTE: Patient presents sitting up in bedside chair agreeable to treatment.  He ascended/descended 13 steps with verbal cues for safety. He did well with ambulation this afternoon. Gave patient a home exercise program consisting of standing and seated exercises. He demonstrated good technique with seated exercises. Next treatment will advance to standing exercises. Overall good progress toward goals. Will continue PT efforts. This section established at most recent assessment   PROBLEM LIST (Impairments causing functional limitations):  1. Decreased Strength  2. Decreased ADL/Functional Activities  3. Decreased Transfer Abilities  4. Decreased Ambulation Ability/Technique  5. Decreased Balance  6. Decreased Activity Tolerance    INTERVENTIONS PLANNED: (Benefits and precautions of physical therapy have been discussed with the patient.)  1. Balance Exercise  2. Bed Mobility  3. Gait Training  4. Therapeutic Activites  5. Transfer Training      TREATMENT PLAN: Frequency/Duration: twice daily for duration of hospital stay  Rehabilitation Potential For Stated Goals: [facility]QUIPMENT: (at time of discharge pending progress): Continue Skilled Therapy. HISTORY:   History of Present Injury/Illness (Reason for Referral):  Date of Procedure: 6/15/2017   Preoperative Diagnosis: Atherosclerosis of native coronary artery of native heart with unstable angina pectoris ([facility]) [I25.110]  Postoperative Diagnosis: Atherosclerosis of native coronary artery of native heart with unstable angina pectoris ([facility] [I25.110]   Procedure(s):  LYSIS OF ADHESIONS  CORONARY ARTERY BYPASS GRAFT (CABG X 2)/ JEAN-BAPTISTE   LIMA to LAD, RSVG to OM     L Greater saphenous VEIN HARVEST  ESOPHAGEAL TRANS ECHOCARDIOGRAM  Past Medical History/Comorbidities:   Mr. 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 He also has no past medical history of Arrhythmia; Arthritis; Asthma; Autoimmune disease ([facility] Cancer ([facility] Chronic kidney disease; Chronic obstructive pulmonary disease ([facility] Diabetes ([facility] Difficult intubation; GERD (gastroesophageal reflux disease); Heart failure ([facility] Liver disease; Malignant hyperthermia due to anesthesia; Nausea & vomiting; Pseudocholinesterase deficiency; Psychiatric disorder; PUD (peptic ulcer disease); Seizures ([facility] Sleep apnea; Stroke [facility]); Thromboembolus ([facility] or Thyroid disease. Mr. Zak Zuniga  has a past surgical history that includes hernia repair; other surgical; and carotid endarterectomy (Right, 2012). Social History/Living Environment:   Home Environment: Private residence  One/Two Story Residence: One story  Living Alone: No  Support Systems: Spouse/Significant Other/Partner, Family member(s)  Patient Expects to be Discharged to[de-identified] Private residence  Current DME Used/Available at Home: None  Prior Level of Function/Work/Activity:  Completely independent, no DME used. Number of Personal Factors/Comorbidities that affect the Plan of Care: 1-2: MODERATE COMPLEXITY   EXAMINATION:   Most Recent Physical Functioning:   Gross Assessment:                  Posture:  Posture (WDL): Exceptions to WDL  Posture Assessment:  Forward head, Rounded shoulders  Balance:  Sitting: Intact  Standing: Impaired  Standing - Static: Good  Standing - Dynamic : Fair Bed Mobility:     Wheelchair Mobility:     Transfers:  Sit to Stand: Stand-by asssistance  Stand to Sit: Stand-by asssistance  Gait:     Base of Support: Widened  Speed/Sherin: Shuffled;Pace decreased (<100 feet/min)  Gait Abnormalities: Decreased step clearance  Distance (ft): 200 Feet (ft)  Assistive Device:  (Handheld assistance)  Ambulation - Level of Assistance: Stand-by asssistance  Number of Stairs Trained: 13  Stairs - Level of Assistance: Stand-by asssistance  Rail Use: Left  (last 3 steps with no hand rail)       Body Structures Involved:  1. Heart  2. Lungs  3. Bones  4. Joints  5. Muscles  6. Ligaments Body Functions Affected:  1. Mental  2. Cardio  3. Respiratory  4. Neuromusculoskeletal  5. Movement Related Activities and Participation Affected:  1. Learning and Applying Knowledge  2. General Tasks and Demands  3. Communication  4. Mobility  5. Self Care  6. Domestic Life   Number of elements that affect the Plan of Care: 3: MODERATE COMPLEXITY   CLINICAL PRESENTATION:   Presentation: Evolving clinical presentation with changing clinical characteristics: MODERATE COMPLEXITY   CLINICAL DECISION MAKIN [facility] Short Form  How much difficulty does the patient currently have. .. Unable A Lot A Little None   1. Turning over in bed (including adjusting bedclothes, sheets and blankets)? [ ] 1   [ ] 2   [X] 3   [ ] 4   2. Sitting down on and standing up from a chair with arms ( e.g., wheelchair, bedside commode, etc.)   [ ] 1   [ ] 2   [X] 3   [ ] 4   3. Moving from lying on back to sitting on the side of the bed? [ ] 1   [ ] 2   [X] 3   [ ] 4   How much help from another person does the patient currently need. .. Total A Lot A Little None   4. Moving to and from a bed to a chair (including a wheelchair)? [ ] 1   [ ] 2   [X] 3   [ ] 4   5. Need to walk in hospital room? [ ] 1   [ ] 2   [X] 3   [ ] 4   6. Climbing 3-5 steps with a railing? [ ] 1   [ ] 2   [X] 3   [ ] 4   © , Trustees of [address] [place] [address] [address] [address], under license to [employer]. All rights reserved    Score:  Initial: 18 Most Recent: X (Date: -- )     Interpretation of Tool:  Represents activities that are increasingly more difficult (i.e. Bed mobility, Transfers, Gait).        Score 24 23 22-20 19-15 14-10 9-7 6       Modifier CH CI CJ CK CL CM CN         · Mobility - Walking and Moving Around:               - CURRENT STATUS:    CK - 40%-59% impaired, limited or restricted               - GOAL STATUS:           CK - 40%-59% impaired, limited or restricted               - D/C STATUS:                       ---------------To be determined---------------  Payor: [employer] / Plan: Aaliyah Owens / Product Type: [employer] Medicare /       Medical Necessity:     · Skilled intervention continues to be required due to decreased function. Reason for Services/Other Comments:  · Patient continues to require skilled intervention due to medical complications and patient unable to attend/participate in therapy as expected. Use of outcome tool(s) and clinical judgement create a POC that gives a: Questionable prediction of patient's progress: MODERATE COMPLEXITY                 TREATMENT:   (In addition to Assessment/Re-Assessment sessions the following treatments were rendered)   Pre-treatment Symptoms/Complaints:  \"I am going home tomorrow. \"  Pain: Initial:   Pain Intensity 1: 0  Post Session:  same      Therapeutic Activity: (    13 min): Therapeutic activities including Chair transfers and Ambulation on level ground to improve mobility, strength, balance and coordination. Required minimal verbal cues   to promote static and dynamic balance in standing, promote coordination of bilateral, lower extremity(s) and promote motor control of bilateral, lower extremity(s). Therapeutic Exercise: (12 Minutes):  Exercises per grid below to improve mobility, strength and activity tolerance. Required minimal visual and verbal cues to promote proper body posture and promote proper body breathing techniques. Progressed repetitions and complexity of movement as indicated.        Date:  6-18-17 Date:   Date:     ACTIVITY/EXERCISE AM PM AM PM AM PM   Ambulation:           Distance  Device  Duration         Seated Heel Raises x20 x20 Seated Toe Raises x20 x20       Seated Long Arc Quads x20 x20       Seated Marching x20 x20       Seated Hip Abduction  x20       Glut set  x20       Shoulder shrugs  x10                                                    B = bilateral; AA = active assistive; A = active; P = passive         Braces/Orthotics/Lines/Etc:   · None  Treatment/Session Assessment:    · Response to Treatment:  Patient tolerated therapy with some fatigue but overall well. · Interdisciplinary Collaboration:  · Physical Therapy Assistant and Registered Nurse  · After treatment position/precautions:  · Up in chair  · Call light within reach  · RN notified  · Compliance with Program/Exercises: Will assess as treatment progresses. · Recommendations/Intent for next treatment session: \"Next visit will focus on advancements to more challenging activities and reduction in assistance provided\".   Total Treatment Duration:  PT Patient Time In/Time Out  Time In: 1305  Time Out: [address], PTA

## 2017-06-18 NOTE — PROGRESS NOTES
Reggie Lin  Admission Date: 6/14/2017             Daily Progress Note: 6/18/2017    The patient's chart is reviewed and the patient is discussed with the staff. 67 yo male with a history of HTN, HL, carotid artery disease s/p CEA. and  CAD. Patient presented with exertional dyspnea improved with rest. [facility] critical left main coronary disease with preserved LVEF. He was admitted to the hospital and placed on heparin drip. He is now s/p CABG x 2 on 6/15/17 per Dr. Torres Armas. Subjective: Incisional pain controlled with pain mediations. Denies palpitations.     On NC 1 LPM    Current Facility-Administered Medications   Medication Dose Route Frequency    albuterol (PROVENTIL VENTOLIN) nebulizer solution 2.5 mg  2.5 mg Nebulization QID RT    metoprolol tartrate (LOPRESSOR) tablet 25 mg  25 mg Oral Q12H    senna-docusate (PERICOLACE) 8.6-50 mg per tablet 2 Tab  2 Tab Oral BID    sodium chloride (NS) flush 5-10 mL  5-10 mL IntraVENous Q8H    sodium chloride (NS) flush 5-10 mL  5-10 mL IntraVENous PRN    furosemide (LASIX) tablet 40 mg  40 mg Oral DAILY    alum-mag hydroxide-simeth (MYLANTA) oral suspension 30 mL  30 mL Oral Q4H PRN    acetaminophen (TYLENOL) tablet 650 mg  650 mg Oral Q4H PRN    insulin regular (NOVOLIN R, HUMULIN R) injection   SubCUTAneous AC&HS    mupirocin (BACTROBAN) 2 % ointment   Both Nostrils BID    aspirin delayed-release tablet 81 mg  81 mg Oral DAILY    magnesium oxide (MAG-OX) tablet 400 mg  400 mg Oral TID PRN    magnesium oxide (MAG-OX) tablet 400 mg  400 mg Oral QID PRN    potassium chloride (K-DUR, KLOR-CON) SR tablet 20 mEq  20 mEq Oral DAILY    potassium chloride (K-DUR, KLOR-CON) SR tablet 20 mEq  20 mEq Oral BID PRN    potassium chloride (K-DUR, KLOR-CON) SR tablet 40 mEq  40 mEq Oral BID PRN    oxyCODONE-acetaminophen (PERCOCET) 5-325 mg per tablet 1 Tab  1 Tab Oral Q4H PRN    ferrous sulfate tablet 325 mg  1 Tab Oral DAILY WITH BREAKFAST    famotidine (PEPCID) tablet 20 mg  20 mg Oral DAILY    ondansetron (ZOFRAN) injection 4 mg  4 mg IntraVENous Q4H PRN    bisacodyl (DULCOLAX) tablet 10 mg  10 mg Oral DAILY PRN    traMADol (ULTRAM) tablet 50 mg  50 mg Oral Q6H PRN    amiodarone (CORDARONE) tablet 400 mg  400 mg Oral Q12H    atorvastatin (LIPITOR) tablet 40 mg  40 mg Oral QPM       Review of Systems  Constitutional: negative for fever, chills, sweats  Cardiovascular: negative for chest pain, palpitations, syncope, edema  Gastrointestinal:  negative for dysphagia, reflux, vomiting, diarrhea, abdominal pain, or melena  Neurologic:  negative for focal weakness, numbness, headache    Objective:     Vitals:    06/18/17 0101 06/18/17 0306 06/18/17 0648 06/18/17 0715   BP:  127/57 188/82 156/71   Pulse:  63 62 (!) 54   Resp:  18  18   Temp:  97.8 °F (36.6 °C)  97.4 °F (36.3 °C)   SpO2:  96%  98%   Weight: 186 lb 1.6 oz (84.4 kg)      Height: 5' 6\" (1.676 m)        Intake and Output:   06/16 1901 - 06/18 0700  In: 550 [P.O.:550]  Out: 2400 [Urine:2400]       Physical Exam:   Constitution:  the patient is well developed and in no acute distress  EENMT:  Sclera clear, pupils equal, oral mucosa moist  Respiratory: diminished bilaterally, O2 at 1 lpm  Cardiovascular:  RRR without M,G,R  Gastrointestinal: soft and non-tender; with positive bowel sounds. Musculoskeletal: warm without cyanosis. There is 1+ lower leg edema.   Skin:  no jaundice or rashes, midline sternal incision without redness, swelling, or drainage   Neurologic: no gross neuro deficits     Psychiatric:  alert and oriented x 3    CXR:         LAB  Recent Labs      06/18/17   0615  06/17/17   2037  06/17/17   1628  06/17/17   1119  06/17/17   0526   GLUCPOC  136*  109*  151*  186*  154*      Recent Labs      06/18/17   0416  06/17/17   0520  06/16/17   0300  06/15/17   2000   06/15/17   1110   WBC  14.2*  11.1  7.7   --    --   9.5   HGB  10.2*  9.8*  9.3*  10.1*   < >  9.0*   HCT 31.7*  30.5*  28.3*  30.0*   < >  27.1*   PLT  156  139*  135*   --    --   156   INR   --    --    --    --    --   1.5*    < > = values in this interval not displayed. Recent Labs      06/18/17   0416  06/17/17   0520  06/16/17   0300   NA  143  144  151*   K  4.3  4.8  4.5   CL  108*  111*  119*   CO2  27  25  25   GLU  120*  138*  100   BUN  29*  22  16   CREA  1.33  1.28  1.46   MG  2.5*  2.8*  3.0*   CA  8.5  8.5  7.8*     Recent Labs      06/15/17   1819  06/15/17   1750  06/15/17   1651   PH  7.32*  7.29*  7.26*   PCO2  48*  44  48*   PO2  95  98  87   HCO3  24  21*  21*     No results for input(s): LCAD, LAC in the last 72 hours. Assessment:  (Medical Decision Making)     Hospital Problems  Date Reviewed: 6/17/2017          Codes Class Noted POA    Hypernatremia ICD-10-CM: E87.0  ICD-9-CM: 276.0  6/16/2017 No    Na+ 143      Atelectasis ICD-10-CM: J98.11  ICD-9-CM: 518.0  6/16/2017 No     albuterol IPPB  Add steroids       * (Principal)CAD (coronary artery disease) (Chronic) ICD-10-CM: I25.10  ICD-9-CM: 414.00  6/15/2017 Yes        S/P CABG x 2 ICD-10-CM: Z95.1  ICD-9-CM: V45.81  6/15/2017 No     6/15/17 Dr. Ignacio Duncan (CABG X 2)/ LIMA to LAD, RSVG to OM     L Greater saphenous VEIN HARVEST         Hypoxia ICD-10-CM: R09.02  ICD-9-CM: 799.02  6/15/2017 No    Remains on O2 at 1 lpm      Unstable angina ([facility] ICD-10-CM: I20.0  ICD-9-CM: 411.1  6/14/2017 Yes              Plan:  (Medical Decision Making)     --cont  Albuterol IPPB and add steroids  --continue to wean o2 as tolerated  --mobilize    More than 50% of the time documented was spent in face-to-face contact with the patient and in the care of the patient on the floor/unit where the patient is located.     Walter Inman MD

## 2017-06-18 NOTE — PROGRESS NOTES
[facility] CARDIOLOGY PROGRESS NOTE           6/18/2017 11:52 AM    Admit Date: 6/14/2017      Subjective:   No complaints     ROS:  Cardiovascular:  As noted above    Objective:      Vitals:    06/18/17 0306 06/18/17 0648 06/18/17 0715 06/18/17 0836   BP: 127/57 188/82 156/71    Pulse: 63 62 (!) 54    Resp: 18  18    Temp: 97.8 °F (36.6 °C)  97.4 °F (36.3 °C)    SpO2: 96%  98% 96%   Weight:       Height:           Physical Exam   Constitutional: He is oriented to person, place, and time. He appears well-developed. Eyes: Pupils are equal, round, and reactive to light. Neck: Normal range of motion. Cardiovascular:   No murmur heard. Pulmonary/Chest: Effort normal.   Abdominal: Soft. Musculoskeletal: Normal range of motion. Neurological: He is alert and oriented to person, place, and time. Skin: Skin is warm. Data Review:   Recent Labs      06/18/17   0416  06/17/17   0520   06/15/17   1110   NA  143  144   < >   --    K  4.3  4.8   < >   --    MG  2.5*  2.8*   < >   --    BUN  29*  22   < >   --    CREA  1.33  1.28   < >   --    GLU  120*  138*   < >   --    WBC  14.2*  11.1   < >  9.5   HGB  10.2*  9.8*   < >  9.0*   HCT  31.7*  30.5*   < >  27.1*   PLT  156  139*   < >  156   INR   --    --    --   1.5*    < > = values in this interval not displayed.        Intake/Output Summary (Last 24 hours) at 06/18/17 0849  Last data filed at 06/18/17 0842   Gross per 24 hour   Intake              420 ml   Output             1500 ml   Net            -1080 ml     Current Facility-Administered Medications   Medication Dose Route Frequency    albuterol (PROVENTIL VENTOLIN) nebulizer solution 2.5 mg  2.5 mg Nebulization Q4H RT    budesonide (PULMICORT) 500 mcg/2 ml nebulizer suspension  500 mcg Nebulization BID RT    methylPREDNISolone (PF) (SOLU-MEDROL) injection 40 mg  40 mg IntraVENous Q12H    metoprolol tartrate (LOPRESSOR) tablet 25 mg  25 mg Oral Q12H    senna-docusate (PERICOLACE) 8.6-50 mg per tablet 2 Tab  2 Tab Oral BID    sodium chloride (NS) flush 5-10 mL  5-10 mL IntraVENous Q8H    sodium chloride (NS) flush 5-10 mL  5-10 mL IntraVENous PRN    furosemide (LASIX) tablet 40 mg  40 mg Oral DAILY    alum-mag hydroxide-simeth (MYLANTA) oral suspension 30 mL  30 mL Oral Q4H PRN    acetaminophen (TYLENOL) tablet 650 mg  650 mg Oral Q4H PRN    insulin regular (NOVOLIN R, HUMULIN R) injection   SubCUTAneous AC&HS    mupirocin (BACTROBAN) 2 % ointment   Both Nostrils BID    aspirin delayed-release tablet 81 mg  81 mg Oral DAILY    magnesium oxide (MAG-OX) tablet 400 mg  400 mg Oral TID PRN    magnesium oxide (MAG-OX) tablet 400 mg  400 mg Oral QID PRN    potassium chloride (K-DUR, KLOR-CON) SR tablet 20 mEq  20 mEq Oral DAILY    potassium chloride (K-DUR, KLOR-CON) SR tablet 20 mEq  20 mEq Oral BID PRN    potassium chloride (K-DUR, KLOR-CON) SR tablet 40 mEq  40 mEq Oral BID PRN    oxyCODONE-acetaminophen (PERCOCET) 5-325 mg per tablet 1 Tab  1 Tab Oral Q4H PRN    ferrous sulfate tablet 325 mg  1 Tab Oral DAILY WITH BREAKFAST    famotidine (PEPCID) tablet 20 mg  20 mg Oral DAILY    ondansetron (ZOFRAN) injection 4 mg  4 mg IntraVENous Q4H PRN    bisacodyl (DULCOLAX) tablet 10 mg  10 mg Oral DAILY PRN    traMADol (ULTRAM) tablet 50 mg  50 mg Oral Q6H PRN    amiodarone (CORDARONE) tablet 400 mg  400 mg Oral Q12H    atorvastatin (LIPITOR) tablet 40 mg  40 mg Oral QPM         Assessment/Plan:     1. Coronary artery disease symptoms currently stable coronary artery bypass grafting with LIMA to LAD and SVG to OM. Patient had nonobstructive disease in right coronary artery. Normal left ventricular systolic function  2. Hypoxia improved pulmonary following. Only on 1 L O2 6/18. On lasix po taper will auto DC. Will likely not need at discharge and can be reassessed at follow up. 3. Hypernatremia sodium 151 6/16/2017 within normal limits 6/17/2017  4.  Antiarrhythmic drug therapy patient on amiodarone 400 twice a day. Plan for decreased dose and discontinuation following discharge.  In sinus rhythm 6/18      Soham Grigsby MD  6/18/2017 11:52 AM

## 2017-06-18 NOTE — PROGRESS NOTES
Bedside shift report given to Belkys Chavarria RN (oncoming nurse) by Rachel Mcduffie RN (offgoing nurse). Bedside shift report included the following information: SBAR, Kardex, ED Summary, MAR, and Recent Results.

## 2017-06-18 NOTE — PROGRESS NOTES
Problem: Mobility Impaired (Adult and Pediatric)  Goal: *Acute Goals and Plan of Care (Insert Text)    LTG:  (1.)Mr. Cabezas will move from supine to sit and sit to supine , scoot up and down and roll side to side in bed with SUPERVISION within 7 day(s). (2.)Mr. Cabezas will transfer from bed to chair and chair to bed with INDEPENDENT using the least restrictive device within 7 day(s). (3.)Mr. Cabezas will ambulate with SUPERVISION for 250+ feet with the least restrictive device within 7 day(s). ________________________________________________________________________________________________      PHYSICAL THERAPY: Daily Note, Treatment Day: 1st and AM 6/18/2017  INPATIENT: Hospital Day: 5  Payor: Remer Kocher / Plan: Aaliyah Owens / Product Type: [employer] /      NAME/AGE/GENDER: Merissa Angel is a 68 y.o. male        PRIMARY DIAGNOSIS: Atherosclerosis of native coronary artery of native heart with unstable angina pectoris ([facility] [I25.110] CAD (coronary artery disease) CAD (coronary artery disease)  Procedure(s) (LRB):  CORONARY ARTERY BYPASS GRAFT (CABG X 2)/ LIMA  (N/A)  VEIN HARVEST (Left)  ESOPHAGEAL TRANS ECHOCARDIOGRAM (N/A)  3 Days Post-Op  ICD-10: Treatment Diagnosis:       · Generalized Muscle Weakness (M62.81)  · Difficulty in walking, Not elsewhere classified (R26.2)   Precaution/Allergies:  Review of patient's allergies indicates no known allergies. ASSESSMENT:      Mr. Bruna Perez presents sitting up in bedside chair agreeable to treatment. He increased his ambulation distance with no assistive device and on room air. His O2 sats ranged between 92-96% throughout ambulation. He returned to room and was instructed in seated exercises. He demonstrated good technique with all exercises. Great progress with mobility this treatment. Will continue PT efforts.           This section established at most recent assessment   PROBLEM LIST (Impairments causing functional limitations):  1. Decreased Strength  2. Decreased ADL/Functional Activities  3. Decreased Transfer Abilities  4. Decreased Ambulation Ability/Technique  5. Decreased Balance  6. Decreased Activity Tolerance    INTERVENTIONS PLANNED: (Benefits and precautions of physical therapy have been discussed with the patient.)  1. Balance Exercise  2. Bed Mobility  3. Gait Training  4. Therapeutic Activites  5. Transfer Training      TREATMENT PLAN: Frequency/Duration: twice daily for duration of hospital stay  Rehabilitation Potential For Stated Goals: Cheko Teresa REHABILITATION/EQUIPMENT: (at time of discharge pending progress): Continue Skilled Therapy. HISTORY:   History of Present Injury/Illness (Reason for Referral):  Date of Procedure: 6/15/2017   Preoperative Diagnosis: Atherosclerosis of native coronary artery of native heart with unstable angina pectoris ([facility]) [I25.110]  Postoperative Diagnosis: Atherosclerosis of native coronary artery of native heart with unstable angina pectoris ([address] [I25.110]   Procedure(s):  LYSIS OF ADHESIONS  CORONARY ARTERY BYPASS GRAFT (CABG X 2)/ JEAN-BAPTISTE   LIMA to LAD, RSVG to OM     L Greater saphenous VEIN HARVEST  ESOPHAGEAL TRANS ECHOCARDIOGRAM  Past Medical History/Comorbidities:   Mr. 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 He also has no past medical history of Arrhythmia; Arthritis; Asthma; Autoimmune disease ([address] Cancer ([address] Chronic kidney disease; Chronic obstructive pulmonary disease ([address] Diabetes ([address] Difficult intubation; GERD (gastroesophageal reflux disease);  Heart failure ([facility] Liver disease; Malignant hyperthermia due to anesthesia; Nausea & vomiting; Pseudocholinesterase deficiency; Psychiatric disorder; PUD (peptic ulcer disease); Seizures ([facility] Sleep apnea; Stroke [facility]); Thromboembolus ([facility] or Thyroid disease. Mr. Phylicia Mirza  has a past surgical history that includes hernia repair; other surgical; and carotid endarterectomy (Right, 2012). Social History/Living Environment:   Home Environment: Private residence  One/Two Story Residence: One story  Living Alone: No  Support Systems: Spouse/Significant Other/Partner, Family member(s)  Patient Expects to be Discharged to[de-identified] Private residence  Current DME Used/Available at Home: None  Prior Level of Function/Work/Activity:  Completely independent, no DME used. Number of Personal Factors/Comorbidities that affect the Plan of Care: 1-2: MODERATE COMPLEXITY   EXAMINATION:   Most Recent Physical Functioning:   Gross Assessment:                  Posture:  Posture (WDL): Exceptions to WDL  Posture Assessment: Forward head, Rounded shoulders  Balance:  Sitting: Intact  Standing: Impaired  Standing - Static: Good  Standing - Dynamic : Fair Bed Mobility:     Wheelchair Mobility:     Transfers:  Sit to Stand: Contact guard assistance  Stand to Sit: Stand-by asssistance  Gait:     Base of Support: Widened  Speed/Sherin: Shuffled;Pace decreased (<100 feet/min)  Gait Abnormalities: Decreased step clearance  Distance (ft): 250 Feet (ft)  Assistive Device:  (Handheld assistance)  Ambulation - Level of Assistance: Contact guard assistance       Body Structures Involved:  1. Heart  2. Lungs  3. Bones  4. Joints  5. Muscles  6. Ligaments Body Functions Affected:  1. Mental  2. Cardio  3. Respiratory  4. Neuromusculoskeletal  5. Movement Related Activities and Participation Affected:  1. Learning and Applying Knowledge  2. General Tasks and Demands  3. Communication  4. Mobility  5. Self Care  6.  Domestic Life   Number of elements that affect the Plan of Care: 3: MODERATE COMPLEXITY   CLINICAL PRESENTATION:   Presentation: Evolving clinical presentation with changing clinical characteristics: MODERATE COMPLEXITY   CLINICAL DECISION MAKIN [facility] Mob[facility] Inpatient Short Form  How much difficulty does the patient currently have. .. Unable A Lot A Little None   1. Turning over in bed (including adjusting bedclothes, sheets and blankets)? [ ] 1   [ ] 2   [X] 3   [ ] 4   2. Sitting down on and standing up from a chair with arms ( e.g., wheelchair, bedside commode, etc.)   [ ] 1   [ ] 2   [X] 3   [ ] 4   3. Moving from lying on back to sitting on the side of the bed? [ ] 1   [ ] 2   [X] 3   [ ] 4   How much help from another person does the patient currently need. .. Total A Lot A Little None   4. Moving to and from a bed to a chair (including a wheelchair)? [ ] 1   [ ] 2   [X] 3   [ ] 4   5. Need to walk in hospital room? [ ] 1   [ ] 2   [X] 3   [ ] 4   6. Climbing 3-5 steps with a railing? [ ] 1   [ ] 2   [X] 3   [ ] 4   © , Trustees of [address] [place] [address], under license to [employer] All rights reserved    Score:  Initial: 18 Most Recent: X (Date: -- )     Interpretation of Tool:  Represents activities that are increasingly more difficult (i.e. Bed mobility, Transfers, Gait). Score 24 23 22-20 19-15 14-10 9-7 6       Modifier CH CI CJ CK CL CM CN         · Mobility - Walking and Moving Around:               - CURRENT STATUS:    CK - 40%-59% impaired, limited or restricted               - GOAL STATUS:           CK - 40%-59% impaired, limited or restricted               - D/C STATUS:                       ---------------To be determined---------------  Payor: [employer]SE [employer]ICARE / Plan: Arlin Rodrigues / Product Type: Managed Care Medicare /       Medical Necessity:     · Skilled intervention continues to be required due to decreased function.   Reason for Services/Other Comments:  · Patient continues to require skilled intervention due to medical complications and patient unable to attend/participate in therapy as expected. Use of outcome tool(s) and clinical judgement create a POC that gives a: Questionable prediction of patient's progress: MODERATE COMPLEXITY                 TREATMENT:   (In addition to Assessment/Re-Assessment sessions the following treatments were rendered)   Pre-treatment Symptoms/Complaints:  \"I am ready. \"  Pain: Initial:   Pain Intensity 1: 0  Post Session:  same      Therapeutic Activity: (    13 min): Therapeutic activities including Bed transfers, Chair transfers and Ambulation on level ground to improve mobility, strength, balance and coordination. Required minimal   to promote static and dynamic balance in standing, promote coordination of bilateral, lower extremity(s) and promote motor control of bilateral, lower extremity(s). Therapeutic Exercise: (12 Minutes):  Exercises per grid below to improve mobility, strength and activity tolerance. Required minimal visual and verbal cues to promote proper body posture and promote proper body breathing techniques. Progressed repetitions and complexity of movement as indicated. Date:  6-18-17 Date:   Date:     ACTIVITY/EXERCISE AM PM AM PM AM PM   Ambulation:           Distance  Device  Duration         Seated Heel Raises x20        Seated Toe Raises x20        Seated Long Arc Quads x20        Seated Marching x20        Seated Hip Abduction                  B = bilateral; AA = active assistive; A = active; P = passive         Braces/Orthotics/Lines/Etc:   · None  Treatment/Session Assessment:    · Response to Treatment:  Patient tolerated therapy with some fatigue but overall well.   · Interdisciplinary Collaboration:  · Physical Therapy Assistant and Registered Nurse  · After treatment position/precautions:  · Up in chair  · Call light within reach  · RN notified  · Compliance with Program/Exercises: Will assess as treatment progresses. · Recommendations/Intent for next treatment session: \"Next visit will focus on advancements to more challenging activities and reduction in assistance provided\".   Total Treatment Duration:  PT Patient Time In/Time Out  Time In: 0845  Time Out: [facility]

## 2017-06-18 NOTE — PROGRESS NOTES
Subjective:   No complaint    Objective:     Patient Vitals for the past 8 hrs:   BP Temp Pulse Resp SpO2   17 0836 - - - - 96 %   17 0715 156/71 97.4 °F (36.3 °C) (!) 54 18 98 %   17 0648 188/82 - 62 - -   17 0306 127/57 97.8 °F (36.6 °C) 63 18 96 %     Temp (24hrs), Av.7 °F (36.5 °C), Min:97.4 °F (36.3 °C), Max:98.2 °F (36.8 °C)        Heart:  regular rate and rhythm, S1, S2 normal, no murmur, click, rub or gallop  Lung:  clear to auscultation bilaterally   Incisions: Clean, dry, and intact    Labs:  Recent Results (from the past 24 hour(s))   GLUCOSE, POC    Collection Time: 17 11:19 AM   Result Value Ref Range    Glucose (POC) 186 (H) 65 - 100 mg/dL   GLUCOSE, POC    Collection Time: 17  4:28 PM   Result Value Ref Range    Glucose (POC) 151 (H) 65 - 100 mg/dL   GLUCOSE, POC    Collection Time: 17  8:37 PM   Result Value Ref Range    Glucose (POC) 109 (H) 65 - 100 mg/dL   PLEASE READ & DOCUMENT PPD TEST IN 48 HRS    Collection Time: 17  9:00 PM   Result Value Ref Range    PPD Neg Negative    mm Induration 0 mm   METABOLIC PANEL, BASIC    Collection Time: 17  4:16 AM   Result Value Ref Range    Sodium 143 136 - 145 mmol/L    Potassium 4.3 3.5 - 5.1 mmol/L    Chloride 108 (H) 98 - 107 mmol/L    CO2 27 21 - 32 mmol/L    Anion gap 8 7 - 16 mmol/L    Glucose 120 (H) 65 - 100 mg/dL    BUN 29 (H) 8 - 23 MG/DL    Creatinine 1.33 0.8 - 1.5 MG/DL    GFR est AA >60 >60 ml/min/1.73m2    GFR est non-AA 56 (L) >60 ml/min/1.73m2    Calcium 8.5 8.3 - 10.4 MG/DL   MAGNESIUM    Collection Time: 17  4:16 AM   Result Value Ref Range    Magnesium 2.5 (H) 1.8 - 2.4 mg/dL   CBC W/O DIFF    Collection Time: 17  4:16 AM   Result Value Ref Range    WBC 14.2 (H) 4.3 - 11.1 K/uL    RBC 3.47 (L) 4.23 - 5.67 M/uL    HGB 10.2 (L) 13.6 - 17.2 g/dL    HCT 31.7 (L) 41.1 - 50.3 %    MCV 91.4 79.6 - 97.8 FL    MCH 29.4 26.1 - 32.9 PG    MCHC 32.2 31.4 - 35.0 g/dL    RDW 14.8 (H) 11.9 - 14.6 %    PLATELET 656 772 - 237 K/uL    MPV 9.8 (L) 10.8 - 14.1 FL   GLUCOSE, POC    Collection Time: 06/18/17  6:15 AM   Result Value Ref Range    Glucose (POC) 136 (H) 65 - 100 mg/dL       Assessment:     Principal Problem:    CAD (coronary artery disease) (6/15/2017)    Active Problems:    Unstable angina ([address] (6/14/2017)      S/P CABG x 2 (6/15/2017)      Overview: 6/15/17 Dr. Oumar Packer (CABG X 2)/ LIMA to LAD, RSVG to OM             L Greater saphenous VEIN HARVEST      Hypoxia (6/15/2017)      Hypernatremia (6/16/2017)      Atelectasis (6/16/2017)        Plan/Recommendations/Medical Decision Making:     Doing well, home in am    See orders

## 2017-06-19 ENCOUNTER — HOME HEALTH ADMISSION (OUTPATIENT)
Dept: HOME HEALTH SERVICES | Facility: HOME HEALTH | Age: 77
End: 2017-06-19
Payer: MEDICARE

## 2017-06-19 VITALS
RESPIRATION RATE: 18 BRPM | BODY MASS INDEX: 29.65 KG/M2 | TEMPERATURE: 97.9 F | HEIGHT: 66 IN | HEART RATE: 68 BPM | WEIGHT: 184.5 LBS | OXYGEN SATURATION: 94 % | DIASTOLIC BLOOD PRESSURE: 86 MMHG | SYSTOLIC BLOOD PRESSURE: 188 MMHG

## 2017-06-19 PROBLEM — R09.02 HYPOXIA: Status: RESOLVED | Noted: 2017-06-15 | Resolved: 2017-06-19

## 2017-06-19 PROBLEM — E11.9 DIABETES MELLITUS, NEW ONSET (HCC): Chronic | Status: ACTIVE | Noted: 2017-06-19

## 2017-06-19 PROBLEM — E87.0 HYPERNATREMIA: Status: RESOLVED | Noted: 2017-06-16 | Resolved: 2017-06-19

## 2017-06-19 LAB
ANION GAP BLD CALC-SCNC: 11 MMOL/L (ref 7–16)
BUN SERPL-MCNC: 32 MG/DL (ref 8–23)
CALCIUM SERPL-MCNC: 8.7 MG/DL (ref 8.3–10.4)
CHLORIDE SERPL-SCNC: 108 MMOL/L (ref 98–107)
CO2 SERPL-SCNC: 25 MMOL/L (ref 21–32)
CREAT SERPL-MCNC: 1.17 MG/DL (ref 0.8–1.5)
ERYTHROCYTE [DISTWIDTH] IN BLOOD BY AUTOMATED COUNT: 14.5 % (ref 11.9–14.6)
GLUCOSE BLD STRIP.AUTO-MCNC: 147 MG/DL (ref 65–100)
GLUCOSE SERPL-MCNC: 155 MG/DL (ref 65–100)
HCT VFR BLD AUTO: 31 % (ref 41.1–50.3)
HGB BLD-MCNC: 10.1 G/DL (ref 13.6–17.2)
MAGNESIUM SERPL-MCNC: 2.4 MG/DL (ref 1.8–2.4)
MCH RBC QN AUTO: 29.1 PG (ref 26.1–32.9)
MCHC RBC AUTO-ENTMCNC: 32.6 G/DL (ref 31.4–35)
MCV RBC AUTO: 89.3 FL (ref 79.6–97.8)
PLATELET # BLD AUTO: 184 K/UL (ref 150–450)
PMV BLD AUTO: 10.1 FL (ref 10.8–14.1)
POTASSIUM SERPL-SCNC: 4.3 MMOL/L (ref 3.5–5.1)
RBC # BLD AUTO: 3.47 M/UL (ref 4.23–5.67)
SODIUM SERPL-SCNC: 144 MMOL/L (ref 136–145)
WBC # BLD AUTO: 13.8 K/UL (ref 4.3–11.1)

## 2017-06-19 PROCEDURE — 74011250637 HC RX REV CODE- 250/637: Performed by: NURSE PRACTITIONER

## 2017-06-19 PROCEDURE — 97530 THERAPEUTIC ACTIVITIES: CPT

## 2017-06-19 PROCEDURE — 36415 COLL VENOUS BLD VENIPUNCTURE: CPT | Performed by: THORACIC SURGERY (CARDIOTHORACIC VASCULAR SURGERY)

## 2017-06-19 PROCEDURE — 74011250637 HC RX REV CODE- 250/637: Performed by: THORACIC SURGERY (CARDIOTHORACIC VASCULAR SURGERY)

## 2017-06-19 PROCEDURE — 99232 SBSQ HOSP IP/OBS MODERATE 35: CPT | Performed by: INTERNAL MEDICINE

## 2017-06-19 PROCEDURE — 80048 BASIC METABOLIC PNL TOTAL CA: CPT | Performed by: THORACIC SURGERY (CARDIOTHORACIC VASCULAR SURGERY)

## 2017-06-19 PROCEDURE — 82962 GLUCOSE BLOOD TEST: CPT

## 2017-06-19 PROCEDURE — 94640 AIRWAY INHALATION TREATMENT: CPT

## 2017-06-19 PROCEDURE — 85027 COMPLETE CBC AUTOMATED: CPT | Performed by: THORACIC SURGERY (CARDIOTHORACIC VASCULAR SURGERY)

## 2017-06-19 PROCEDURE — 74011000250 HC RX REV CODE- 250: Performed by: INTERNAL MEDICINE

## 2017-06-19 PROCEDURE — 94760 N-INVAS EAR/PLS OXIMETRY 1: CPT

## 2017-06-19 PROCEDURE — 83735 ASSAY OF MAGNESIUM: CPT | Performed by: THORACIC SURGERY (CARDIOTHORACIC VASCULAR SURGERY)

## 2017-06-19 RX ORDER — LISINOPRIL AND HYDROCHLOROTHIAZIDE 12.5; 2 MG/1; MG/1
1 TABLET ORAL DAILY
Status: DISCONTINUED | OUTPATIENT
Start: 2017-06-19 | End: 2017-06-19 | Stop reason: HOSPADM

## 2017-06-19 RX ORDER — METOPROLOL SUCCINATE 50 MG/1
50 TABLET, EXTENDED RELEASE ORAL DAILY
Qty: 30 TAB | Refills: 5 | Status: SHIPPED | OUTPATIENT
Start: 2017-06-19 | End: 2017-08-30

## 2017-06-19 RX ORDER — TRAMADOL HYDROCHLORIDE 50 MG/1
50 TABLET ORAL
Qty: 30 TAB | Refills: 1 | Status: SHIPPED | OUTPATIENT
Start: 2017-06-19 | End: 2017-07-25

## 2017-06-19 RX ORDER — LANOLIN ALCOHOL/MO/W.PET/CERES
325 CREAM (GRAM) TOPICAL
Qty: 30 TAB | Refills: 0 | Status: SHIPPED | OUTPATIENT
Start: 2017-06-19 | End: 2018-05-31

## 2017-06-19 RX ORDER — ACETAMINOPHEN 325 MG/1
TABLET ORAL
Qty: 30 TAB | Refills: 0 | Status: SHIPPED
Start: 2017-06-19 | End: 2017-07-25

## 2017-06-19 RX ORDER — POTASSIUM CHLORIDE 20 MEQ/1
20 TABLET, EXTENDED RELEASE ORAL DAILY
Qty: 6 TAB | Refills: 0 | Status: SHIPPED | OUTPATIENT
Start: 2017-06-19 | End: 2017-06-25

## 2017-06-19 RX ORDER — FUROSEMIDE 40 MG/1
40 TABLET ORAL DAILY
Qty: 6 TAB | Refills: 0 | Status: SHIPPED | OUTPATIENT
Start: 2017-06-19 | End: 2017-06-23 | Stop reason: ALTCHOICE

## 2017-06-19 RX ORDER — AMOXICILLIN 250 MG
2 CAPSULE ORAL 2 TIMES DAILY
Qty: 30 TAB | Refills: 0 | Status: SHIPPED | OUTPATIENT
Start: 2017-06-19 | End: 2017-07-25

## 2017-06-19 RX ORDER — METFORMIN HYDROCHLORIDE 500 MG/1
500 TABLET ORAL 2 TIMES DAILY WITH MEALS
Qty: 60 TAB | Refills: 1 | Status: SHIPPED | OUTPATIENT
Start: 2017-06-19 | End: 2017-08-30

## 2017-06-19 RX ADMIN — AMIODARONE HYDROCHLORIDE 200 MG: 200 TABLET ORAL at 08:31

## 2017-06-19 RX ADMIN — POTASSIUM CHLORIDE 20 MEQ: 20 TABLET, EXTENDED RELEASE ORAL at 08:31

## 2017-06-19 RX ADMIN — ASPIRIN 81 MG: 81 TABLET, COATED ORAL at 08:31

## 2017-06-19 RX ADMIN — Medication 10 ML: at 04:43

## 2017-06-19 RX ADMIN — MUPIROCIN: 20 OINTMENT TOPICAL at 08:32

## 2017-06-19 RX ADMIN — FERROUS SULFATE TAB 325 MG (65 MG ELEMENTAL FE) 325 MG: 325 (65 FE) TAB at 08:31

## 2017-06-19 RX ADMIN — STANDARDIZED SENNA CONCENTRATE AND DOCUSATE SODIUM 2 TABLET: 8.6; 5 TABLET, FILM COATED ORAL at 08:31

## 2017-06-19 RX ADMIN — METOPROLOL TARTRATE 25 MG: 25 TABLET, FILM COATED ORAL at 04:43

## 2017-06-19 RX ADMIN — FUROSEMIDE 40 MG: 40 TABLET ORAL at 08:31

## 2017-06-19 RX ADMIN — ALBUTEROL SULFATE 2.5 MG: 2.5 SOLUTION RESPIRATORY (INHALATION) at 07:01

## 2017-06-19 RX ADMIN — BUDESONIDE 500 MCG: 0.5 SUSPENSION RESPIRATORY (INHALATION) at 07:01

## 2017-06-19 RX ADMIN — FAMOTIDINE 20 MG: 20 TABLET ORAL at 08:31

## 2017-06-19 RX ADMIN — ALBUTEROL SULFATE 2.5 MG: 2.5 SOLUTION RESPIRATORY (INHALATION) at 04:37

## 2017-06-19 NOTE — PROGRESS NOTES
Problem: Mobility Impaired (Adult and Pediatric)  Goal: *Acute Goals and Plan of Care (Insert Text)    LTG:  (1.)Mr. Cabezas will move from supine to sit and sit to supine , scoot up and down and roll side to side in bed with SUPERVISION within 7 day(s). (2.)Mr. Cabezas will transfer from bed to chair and chair to bed with INDEPENDENT using the least restrictive device within 7 day(s). (3.)Mr. Cabezas will ambulate with SUPERVISION for 250+ feet with the least restrictive device within 7 day(s). ________________________________________________________________________________________________      PHYSICAL THERAPY: Daily Note, Treatment Day: 2nd and AM 6/19/2017  INPATIENT: Hospital Day: 6  Payor: Sammie Gilliland / Plan: Morgan Garvin / Product Type: [employer] Medicare /      NAME/AGE/GENDER: Kirill Garza is a 68 y.o. 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 ASSESSMENT:      Mr. Phylicia Mirza presents sitting up in bedside chair. He is agreeable to treatment. He increased his ambulation distance this treatment with Supervision. Great progress with mobility this morning. Patient is to be discharged from facility. This section established at most recent assessment   PROBLEM LIST (Impairments causing functional limitations):  1. Decreased Strength  2. Decreased ADL/Functional Activities  3. Decreased Transfer Abilities  4.  Decreased Ambulation Ability/Technique  5. Decreased Balance  6. Decreased Activity Tolerance    INTERVENTIONS PLANNED: (Benefits and precautions of physical therapy have been discussed with the patient.)  1. Balance Exercise  2. Bed Mobility  3. Gait Training  4. Therapeutic Activites  5. Transfer Training      TREATMENT PLAN: Frequency/Duration: twice daily for duration of hospital stay  Rehabilitation Potential For Stated Goals: Teagan Plata REHABILITATION/EQUIPMENT: (at time of discharge pending progress): Continue Skilled Therapy. HISTORY:   History of Present Injury/Illness (Reason for Referral):  Date of Procedure: 6/15/2017   Preoperative Diagnosis: Atherosclerosis of native coronary artery of native heart with unstable angina pectoris ([facility]) [I25.110]  Postoperative Diagnosis: Atherosclerosis of native coronary artery of native heart with unstable angina pectoris ([address] [I25.110]   Procedure(s):  LYSIS OF ADHESIONS  CORONARY ARTERY BYPASS GRAFT (CABG X 2)/ JEAN-BAPTISTE   LIMA to LAD, RSVG to OM     L Greater saphenous VEIN HARVEST  ESOPHAGEAL TRANS ECHOCARDIOGRAM  Past Medical History/Comorbidities:   Mr. 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 He also has no past medical history of Arrhythmia; Arthritis; Asthma; Autoimmune disease ([address] Cancer ([address] Chronic kidney disease; Chronic obstructive pulmonary disease ([address] Diabetes ([address] Difficult intubation; GERD (gastroesophageal reflux disease); Heart failure ([address] Liver disease; Malignant hyperthermia due to anesthesia;  Nausea & vomiting; Pseudocholinesterase deficiency; Psychiatric disorder; PUD (peptic ulcer disease); Seizures ([facility] Sleep apnea; Stroke [facility]); Thromboembolus ([facility] or Thyroid disease. Mr. Denver Ouch  has a past surgical history that includes hernia repair; other surgical; and carotid endarterectomy (Right, 2012). Social History/Living Environment:   Home Environment: Private residence  One/Two Story Residence: One story  Living Alone: No  Support Systems: Spouse/Significant Other/Partner, Family member(s)  Patient Expects to be Discharged to[de-identified] Private residence  Current DME Used/Available at Home: None  Prior Level of Function/Work/Activity:  Completely independent, no DME used. Number of Personal Factors/Comorbidities that affect the Plan of Care: 1-2: MODERATE COMPLEXITY   EXAMINATION:   Most Recent Physical Functioning:   Gross Assessment:                  Posture:  Posture (WDL): Exceptions to WDL  Posture Assessment: Forward head, Rounded shoulders  Balance:  Sitting: Intact  Standing: Intact  Standing - Static: Good  Standing - Dynamic : Good Bed Mobility:     Wheelchair Mobility:     Transfers:  Sit to Stand: Supervision  Stand to Sit: Supervision  Gait:     Base of Support: Widened  Speed/Sherin: Pace decreased (<100 feet/min)  Gait Abnormalities: Decreased step clearance  Distance (ft): 500 Feet (ft)  Ambulation - Level of Assistance: Supervision       Body Structures Involved:  1. Heart  2. Lungs  3. Bones  4. Joints  5. Muscles  6. Ligaments Body Functions Affected:  1. Mental  2. Cardio  3. Respiratory  4. Neuromusculoskeletal  5. Movement Related Activities and Participation Affected:  1. Learning and Applying Knowledge  2. General Tasks and Demands  3. Communication  4. Mobility  5. Self Care  6.  Domestic Life   Number of elements that affect the Plan of Care: 3: MODERATE COMPLEXITY   CLINICAL PRESENTATION:   Presentation: Evolving clinical presentation with changing clinical characteristics: MODERATE COMPLEXITY CLINICAL DECISION MAKIN[address] [place] [id number] AM-PAC 6 Clicks   Basic Mobility Inpatient Short Form  How much difficulty does the patient currently have. .. Unable A Lot A Little None   1. Turning over in bed (including adjusting bedclothes, sheets and blankets)? [ ] 1   [ ] 2   [X] 3   [ ] 4   2. Sitting down on and standing up from a chair with arms ( e.g., wheelchair, bedside commode, etc.)   [ ] 1   [ ] 2   [X] 3   [ ] 4   3. Moving from lying on back to sitting on the side of the bed? [ ] 1   [ ] 2   [X] 3   [ ] 4   How much help from another person does the patient currently need. .. Total A Lot A Little None   4. Moving to and from a bed to a chair (including a wheelchair)? [ ] 1   [ ] 2   [X] 3   [ ] 4   5. Need to walk in hospital room? [ ] 1   [ ] 2   [X] 3   [ ] 4   6. Climbing 3-5 steps with a railing? [ ] 1   [ ] 2   [X] 3   [ ] 4   © 2007, Trustees of [address] [place] [address] [address] [address], under license to [employer]. All rights reserved    Score:  Initial: 18 Most Recent: X (Date: -- )     Interpretation of Tool:  Represents activities that are increasingly more difficult (i.e. Bed mobility, Transfers, Gait). Score 24 23 22-20 19-15 14-10 9-7 6       Modifier CH CI CJ CK CL CM CN         · Mobility - Walking and Moving Around:               - CURRENT STATUS:    CK - 40%-59% impaired, limited or restricted               - GOAL STATUS:           CK - 40%-59% impaired, limited or restricted               - D/C STATUS:                       ---------------To be determined---------------  Payor: [employer] MEDICARE / Plan: Thelma Cobb / Product Type: Managed Care Medicare /       Medical Necessity:     · Skilled intervention continues to be required due to decreased function. Reason for Services/Other Comments:  · Patient continues to require skilled intervention due to medical complications and patient unable to attend/participate in therapy as expected.    Use of outcome tool(s) and clinical judgement create a POC that gives a: Questionable prediction of patient's progress: MODERATE COMPLEXITY                 TREATMENT:   (In addition to Assessment/Re-Assessment sessions the following treatments were rendered)   Pre-treatment Symptoms/Complaints:  \"I am leaving. \"  Pain: Initial:   Pain Intensity 1: 0  Post Session:  same      Therapeutic Activity: (    10 min): Therapeutic activities including Chair transfers and Ambulation on level ground to improve mobility, strength, balance and coordination. Required minimal verbal cues   to promote static and dynamic balance in standing, promote coordination of bilateral, lower extremity(s) and promote motor control of bilateral, lower extremity(s). Therapeutic Exercise: ( ):  Exercises per grid below to improve mobility, strength and activity tolerance. Required minimal visual and verbal cues to promote proper body posture and promote proper body breathing techniques. Progressed repetitions and complexity of movement as indicated. Date:  6-18-17 Date:   Date:     ACTIVITY/EXERCISE AM PM AM PM AM PM   Ambulation:           Distance  Device  Duration         Seated Heel Raises x20 x20       Seated Toe Raises x20 x20       Seated Long Arc Quads x20 x20       Seated Marching x20 x20       Seated Hip Abduction  x20       Glut set  x20       Shoulder shrugs  x10                                                    B = bilateral; AA = active assistive; A = active; P = passive         Braces/Orthotics/Lines/Etc:   · None  Treatment/Session Assessment:    · Response to Treatment:  Patient tolerated therapy with some fatigue but overall well. · Interdisciplinary Collaboration:  · Physical Therapy Assistant and Registered Nurse  · After treatment position/precautions:  · Up in chair  · Call light within reach  · RN notified  · Compliance with Program/Exercises: Will assess as treatment progresses.   · Recommendations/Intent for next treatment session: \"Next visit will focus on advancements to more challenging activities and reduction in assistance provided\".   Total Treatment Duration:  PT Patient Time In/Time Out  Time In: 1005  Time Out: 1144 [facility]

## 2017-06-19 NOTE — PROGRESS NOTES
6/19/2017 7:10 AM    Admit Date: 6/14/2017    Admit Diagnosis: Atherosclerosis of native coronary artery of native heart w*      Subjective:    Patient sp cabg. Maintaining nsr.      Objective:      Visit Vitals    /83    Pulse 61    Temp 98 °F (36.7 °C)    Resp 18    Ht 5' 6\" (1.676 m)    Wt 83.7 kg (184 lb 8 oz)    SpO2 95%    BMI 29.78 kg/m2       ROS:  General ROS: negative for - chills  Hematological and Lymphatic ROS: negative for - blood clots or jaundice  Respiratory ROS: no cough, shortness of breath, or wheezing  Cardiovascular ROS: no chest pain or dyspnea on exertion  Gastrointestinal ROS: no abdominal pain, change in bowel habits, or black or bloody stools  Neurological ROS: no TIA or stroke symptoms    Physical Exam:    Physical Examination: General appearance - alert, well appearing, and in no distress  Mental status - alert, oriented to person, place, and time  Eyes - pupils equal and reactive, extraocular eye movements intact  Neck/lymph - supple, no significant adenopathy  Chest/CV - clear to auscultation, no wheezes, rales or rhonchi, symmetric air entry  Heart - normal rate, regular rhythm, normal S1, S2, no murmurs, rubs, clicks or gallops  Abdomen/GI - soft, nontender, nondistended, no masses or organomegaly  Musculoskeletal - no joint tenderness, deformity or swelling  Extremities - peripheral pulses normal, no pedal edema, no clubbing or cyanosis  Skin - normal coloration and turgor, no rashes, no suspicious skin lesions noted    Current Facility-Administered Medications   Medication Dose Route Frequency    albuterol (PROVENTIL VENTOLIN) nebulizer solution 2.5 mg  2.5 mg Nebulization Q4H RT    budesonide (PULMICORT) 500 mcg/2 ml nebulizer suspension  500 mcg Nebulization BID RT    amiodarone (CORDARONE) tablet 200 mg  200 mg Oral Q12H    metoprolol tartrate (LOPRESSOR) tablet 25 mg  25 mg Oral Q12H    senna-docusate (PERICOLACE) 8.6-50 mg per tablet 2 Tab  2 Tab Oral BID  sodium chloride (NS) flush 5-10 mL  5-10 mL IntraVENous Q8H    sodium chloride (NS) flush 5-10 mL  5-10 mL IntraVENous PRN    furosemide (LASIX) tablet 40 mg  40 mg Oral DAILY    alum-mag hydroxide-simeth (MYLANTA) oral suspension 30 mL  30 mL Oral Q4H PRN    acetaminophen (TYLENOL) tablet 650 mg  650 mg Oral Q4H PRN    insulin regular (NOVOLIN R, HUMULIN R) injection   SubCUTAneous AC&HS    mupirocin (BACTROBAN) 2 % ointment   Both Nostrils BID    aspirin delayed-release tablet 81 mg  81 mg Oral DAILY    magnesium oxide (MAG-OX) tablet 400 mg  400 mg Oral TID PRN    magnesium oxide (MAG-OX) tablet 400 mg  400 mg Oral QID PRN    potassium chloride (K-DUR, KLOR-CON) SR tablet 20 mEq  20 mEq Oral DAILY    potassium chloride (K-DUR, KLOR-CON) SR tablet 20 mEq  20 mEq Oral BID PRN    potassium chloride (K-DUR, KLOR-CON) SR tablet 40 mEq  40 mEq Oral BID PRN    oxyCODONE-acetaminophen (PERCOCET) 5-325 mg per tablet 1 Tab  1 Tab Oral Q4H PRN    ferrous sulfate tablet 325 mg  1 Tab Oral DAILY WITH BREAKFAST    famotidine (PEPCID) tablet 20 mg  20 mg Oral DAILY    ondansetron (ZOFRAN) injection 4 mg  4 mg IntraVENous Q4H PRN    bisacodyl (DULCOLAX) tablet 10 mg  10 mg Oral DAILY PRN    traMADol (ULTRAM) tablet 50 mg  50 mg Oral Q6H PRN    atorvastatin (LIPITOR) tablet 40 mg  40 mg Oral QPM       Data Review:   @LABRCNT(Na,K,BUN,CREA,WBC,HGB,HCT,PLT,INR,TRP,TCHOL*,Triglyceride*,LDL*,LDLCPOC HDL*,HDL])@    TELEMETRY: nsr    Assessment/Plan:     Principal Problem:    CAD (coronary artery disease) (6/15/2017) The current medical regimen is effective;  continue present plan and medications. Active Problems:    Unstable angina (HCC) (6/14/2017)The current medical regimen is effective;  continue present plan and medications.         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 Walter Stevenson (CABG X 2)/ LIMA to LAD, RSVG to OM             L Greater saphenous VEIN HARVEST      Hypoxia (6/15/2017)      Hypernatremia (6/16/2017)      Atelectasis (6/16/2017)    Suspect home today    Call if needed      Maximino Grande MD

## 2017-06-19 NOTE — PROGRESS NOTES
Subjective:   No complaint  Feels ready to go home    Objective:     Patient Vitals for the past 8 hrs:   BP Temp Pulse Resp SpO2 Weight   17 0733 188/86 97.9 °F (36.6 °C) 68 18 94 % -   17 0702 - - - - 95 % -   17 0516 172/83 - 61 - - -   17 0443 186/86 - 68 - - -   17 0438 - - - - 95 % -   17 0403 - - - - - 184 lb 8 oz (83.7 kg)   17 0318 169/83 98 °F (36.7 °C) (!) 58 18 95 % -     Temp (24hrs), Av.9 °F (36.6 °C), Min:96.3 °F (35.7 °C), Max:98.8 °F (37.1 °C)        Heart:  regular rate and rhythm, S1, S2 normal, no murmur, click, rub or gallop  Lung:  clear to auscultation bilaterally   Incisions: Clean, dry, and intact    Labs:  Recent Results (from the past 24 hour(s))   GLUCOSE, POC    Collection Time: 17 11:22 AM   Result Value Ref Range    Glucose (POC) 154 (H) 65 - 100 mg/dL   GLUCOSE, POC    Collection Time: 17  4:17 PM   Result Value Ref Range    Glucose (POC) 134 (H) 65 - 100 mg/dL   GLUCOSE, POC    Collection Time: 17  8:37 PM   Result Value Ref Range    Glucose (POC) 142 (H) 65 - 100 mg/dL   PLEASE READ & DOCUMENT PPD TEST IN 72 HRS    Collection Time: 17  9:00 PM   Result Value Ref Range    PPD negative Negative    mm Induration 0 mm   METABOLIC PANEL, BASIC    Collection Time: 17  4:40 AM   Result Value Ref Range    Sodium 144 136 - 145 mmol/L    Potassium 4.3 3.5 - 5.1 mmol/L    Chloride 108 (H) 98 - 107 mmol/L    CO2 25 21 - 32 mmol/L    Anion gap 11 7 - 16 mmol/L    Glucose 155 (H) 65 - 100 mg/dL    BUN 32 (H) 8 - 23 MG/DL    Creatinine 1.17 0.8 - 1.5 MG/DL    GFR est AA >60 >60 ml/min/1.73m2    GFR est non-AA >60 >60 ml/min/1.73m2    Calcium 8.7 8.3 - 10.4 MG/DL   MAGNESIUM    Collection Time: 17  4:40 AM   Result Value Ref Range    Magnesium 2.4 1.8 - 2.4 mg/dL   CBC W/O DIFF    Collection Time: 17  4:40 AM   Result Value Ref Range    WBC 13.8 (H) 4.3 - 11.1 K/uL    RBC 3.47 (L) 4.23 - 5.67 M/uL    HGB 10.1 (L) 13.6 - 17.2 g/dL    HCT 31.0 (L) 41.1 - 50.3 %    MCV 89.3 79.6 - 97.8 FL    MCH 29.1 26.1 - 32.9 PG    MCHC 32.6 31.4 - 35.0 g/dL    RDW 14.5 11.9 - 14.6 %    PLATELET 913 142 - 942 K/uL    MPV 10.1 (L) 10.8 - 14.1 FL   GLUCOSE, POC    Collection Time: 06/19/17  5:26 AM   Result Value Ref Range    Glucose (POC) 147 (H) 65 - 100 mg/dL       Assessment:     Principal Problem:    CAD (coronary artery disease) (6/15/2017)    Active Problems:    Unstable angina ([address] (6/14/2017)      S/P CABG x 2 (6/15/2017)      Overview: 6/15/17 Dr. Michaela Perkins (CABG X 2)/ LIMA to LAD, RSVG to OM             L Greater saphenous VEIN HARVEST      Hypoxia (6/15/2017)      Hypernatremia (6/16/2017)      Atelectasis (6/16/2017)        Plan/Recommendations/Medical Decision Making:     Home today with increase in ACEI for better BP control

## 2017-06-19 NOTE — PROGRESS NOTES
[address]  Face to Face Encounter    Patients Name: Kirill Garza    YOB: 1940    Ordering Physician: Kasi Gutierres MD    Primary Diagnosis: Atherosclerosis of native coronary artery of native heart with unstable angina pectoris [facility]) [I25.110]    Date of Face to Face:   6/19/2017                                  Face to Face Encounter findings are related to primary reason for home care:   yes. 1. I certify that the patient needs intermittent care as follows: skilled nursing care:  skilled observation/assessment, patient education  physical therapy: strengthening    2. I certify that this patient is homebound, that is: 1) patient requires the use of a walker device, special transportation, or assistance of another to leave the home; or 2) patient's condition makes leaving the home medically contraindicated; and 3) patient has a normal inability to leave the home and leaving the home requires considerable and taxing effort. Patient may leave the home for infrequent and short duration for medical reasons, and occasional absences for non-medical reasons. Homebound status is due to the following functional limitations: Patient with strength deficits limiting the performance of all ADL's without caregiver assistance or the use of an assistive device. 3. I certify that this patient is under my care and that I, or a nurse practitioner or  [id number], or clinical nurse specialist, or certified nurse midwife, working with me, had a Face-to-Face Encounter that meets the physician Face-to-Face Encounter requirements.   The following are the clinical findings from the [address] [place] [address] encounter that support the need for skilled services and is a summary of the encounter: see hospital medical record          Epifanio Humphries LMSW  6/19/2017      THE FOLLOWING TO BE COMPLETED BY THE COMMUNITY PHYSICIAN:    I concur with the findings described above from the [facility] encounter that this patient is homebound and in need of a skilled service.     Certifying Physician: _____________________________________      Printed Certifying Physician Name: _____________________________________    Date: _________________

## 2017-06-19 NOTE — PROGRESS NOTES
Care Management Interventions  Mode of Transport at Discharge:  (spouse)  Transition of Care Consult (CM Consult): Home Health [facility])  [address] Yes  Physical Therapy Consult: Yes  Current Support Network: Lives with Spouse  Confirm Follow Up Transport: Family  Plan discussed with Pt/Family/Caregiver: Yes  Freedom of Choice Offered: Yes  Discharge Location  Discharge Placement: Home with home health

## 2017-06-19 NOTE — DISCHARGE INSTRUCTIONS
DISCHARGE SUMMARY from Nurse    The following personal items are in your possession at time of discharge:    Dental Appliances: Uppers, Partials  Visual Aid: Glasses, With patient     Home Medications: Sent home  Jewelry: Ring  Clothing: At bedside, Footwear, Pants, Shirt, Socks  Other Valuables: None             PATIENT INSTRUCTIONS:    After general anesthesia or intravenous sedation, for 24 hours or while taking prescription Narcotics:  · Limit your activities  · Do not drive and operate hazardous machinery  · Do not make important personal or business decisions  · Do  not drink alcoholic beverages  · If you have not urinated within 8 hours after discharge, please contact your surgeon on call. Report the following to your surgeon:  · Excessive pain, swelling, redness or odor of or around the surgical area  · Temperature over 100.5  · Nausea and vomiting lasting longer than 4 hours or if unable to take medications  · Any signs of decreased circulation or nerve impairment to extremity: change in color, persistent  numbness, tingling, coldness or increase pain  · Any questions    *  Please give a list of your current medications to your Primary Care Provider. *  Please update this list whenever your medications are discontinued, doses are      changed, or new medications (including over-the-counter products) are added. *  Please carry medication information at all times in case of emergency situations. These are general instructions for a healthy lifestyle:    No smoking/ No tobacco products/ Avoid exposure to second hand smoke    Surgeon General's Warning:  Quitting smoking now greatly reduces serious risk to your health.     Obesity, smoking, and sedentary lifestyle greatly increases your risk for illness    A healthy diet, regular physical exercise & weight monitoring are important for maintaining a healthy lifestyle    You may be retaining fluid if you have a history of heart failure or if you experience any of the following symptoms:  Weight gain of 3 pounds or more overnight or 5 pounds in a week, increased swelling in our hands or feet or shortness of breath while lying flat in bed. Please call your doctor as soon as you notice any of these symptoms; do not wait until your next office visit. Recognize signs and symptoms of STROKE:    F-face looks uneven    A-arms unable to move or move unevenly    S-speech slurred or non-existent    T-time-call 911 as soon as signs and symptoms begin-DO NOT go       Back to bed or wait to see if you get better-TIME IS BRAIN. Warning Signs of HEART ATTACK     Call 911 if you have these symptoms:   Chest discomfort. Most heart attacks involve discomfort in the center of the chest that lasts more than a few minutes, or that goes away and comes back. It can feel like uncomfortable pressure, squeezing, fullness, or pain.  Discomfort in other areas of the upper body. Symptoms can include pain or discomfort in one or both arms, the back, neck, jaw, or stomach.  Shortness of breath with or without chest discomfort.  Other signs may include breaking out in a cold sweat, nausea, or lightheadedness. Don't wait more than five minutes to call 911 - MINUTES MATTER! Fast action can save your life. Calling 911 is almost always the fastest way to get lifesaving treatment. Emergency Medical Services staff can begin treatment when they arrive -- up to an hour sooner than if someone gets to the hospital by car. The discharge information has been reviewed with the patient. The patient verbalized understanding. Discharge medications reviewed with the patient and appropriate educational materials and side effects teaching were provided.

## 2017-06-19 NOTE — PROGRESS NOTES
Alla Smith  Admission Date: 6/14/2017             Daily Progress Note: 6/19/2017    The patient's chart is reviewed and the patient is discussed with the staff. 67 yo male with a history of HTN, HL, carotid artery disease s/p CEA. and CAD. Patient presented with exertional dyspnea improved with rest. [facility] critical left main coronary disease with preserved LVEF. He was admitted to the hospital and placed on heparin drip. He is now s/p CABG x 2 on 6/15/17 per Dr. Yoly Dalton. Subjective:     Pt sitting on the side of the bed on RA. Pt denies complaints. He has rare cough with no sputum production. He is pulling 1000cc on IS.      Current Facility-Administered Medications   Medication Dose Route Frequency    albuterol (PROVENTIL VENTOLIN) nebulizer solution 2.5 mg  2.5 mg Nebulization Q4H RT    budesonide (PULMICORT) 500 mcg/2 ml nebulizer suspension  500 mcg Nebulization BID RT    amiodarone (CORDARONE) tablet 200 mg  200 mg Oral Q12H    metoprolol tartrate (LOPRESSOR) tablet 25 mg  25 mg Oral Q12H    senna-docusate (PERICOLACE) 8.6-50 mg per tablet 2 Tab  2 Tab Oral BID    sodium chloride (NS) flush 5-10 mL  5-10 mL IntraVENous Q8H    sodium chloride (NS) flush 5-10 mL  5-10 mL IntraVENous PRN    furosemide (LASIX) tablet 40 mg  40 mg Oral DAILY    alum-mag hydroxide-simeth (MYLANTA) oral suspension 30 mL  30 mL Oral Q4H PRN    acetaminophen (TYLENOL) tablet 650 mg  650 mg Oral Q4H PRN    insulin regular (NOVOLIN R, HUMULIN R) injection   SubCUTAneous AC&HS    mupirocin (BACTROBAN) 2 % ointment   Both Nostrils BID    aspirin delayed-release tablet 81 mg  81 mg Oral DAILY    magnesium oxide (MAG-OX) tablet 400 mg  400 mg Oral TID PRN    magnesium oxide (MAG-OX) tablet 400 mg  400 mg Oral QID PRN    potassium chloride (K-DUR, KLOR-CON) SR tablet 20 mEq  20 mEq Oral DAILY    potassium chloride (K-DUR, KLOR-CON) SR tablet 20 mEq  20 mEq Oral BID PRN    potassium chloride (K-DUR, KLOR-CON) SR tablet 40 mEq  40 mEq Oral BID PRN    oxyCODONE-acetaminophen (PERCOCET) 5-325 mg per tablet 1 Tab  1 Tab Oral Q4H PRN    ferrous sulfate tablet 325 mg  1 Tab Oral DAILY WITH BREAKFAST    famotidine (PEPCID) tablet 20 mg  20 mg Oral DAILY    ondansetron (ZOFRAN) injection 4 mg  4 mg IntraVENous Q4H PRN    bisacodyl (DULCOLAX) tablet 10 mg  10 mg Oral DAILY PRN    traMADol (ULTRAM) tablet 50 mg  50 mg Oral Q6H PRN    atorvastatin (LIPITOR) tablet 40 mg  40 mg Oral QPM       Review of Systems  +cough-rare  Constitutional: negative for fever, chills, sweats  Cardiovascular: negative for chest pain, palpitations, syncope, edema  Gastrointestinal:  negative for dysphagia, reflux, vomiting, diarrhea, abdominal pain, or melena  Neurologic:  negative for focal weakness, numbness, headache    Objective:     Vitals:    06/19/17 0403 06/19/17 0438 06/19/17 0443 06/19/17 0516   BP:   186/86 172/83   Pulse:   68 61   Resp:       Temp:       SpO2:  95%     Weight: 184 lb 8 oz (83.7 kg)      Height:         Intake and Output:   06/17 1901 - 06/19 0700  In: 5077 [P.O.:1780]  Out: 2150 [Urine:2150]       Physical Exam:   Constitution:  the patient is well developed and in no acute distress, on RA  EENMT:  Sclera clear, pupils equal, oral mucosa moist  Respiratory: fairly clear  Cardiovascular:  RRR without M,G,R  Gastrointestinal: soft and non-tender; with positive bowel sounds. Musculoskeletal: warm without cyanosis. There is trace lower leg edema.   Skin:  no jaundice or rashes, sternal incision clean, dry, intact   Neurologic: no gross neuro deficits     Psychiatric:  alert and oriented x 3      LAB  Recent Labs      06/19/17   0526  06/18/17   2037  06/18/17   1617  06/18/17   1122  06/18/17   0615   GLUCPOC  147*  142*  134*  154*  136*      Recent Labs      06/19/17   0440  06/18/17   0416  06/17/17   0520   WBC  13.8*  14.2*  11.1   HGB  10.1*  10.2*  9.8*   HCT  31.0*  31.7*  30.5*   PLT  184  156 139*     Recent Labs      06/19/17   0440  06/18/17   0416  06/17/17   0520   NA  144  143  144   K  4.3  4.3  4.8   CL  108*  108*  111*   CO2  25  27  25   GLU  155*  120*  138*   BUN  32*  29*  22   CREA  1.17  1.33  1.28   MG  2.4  2.5*  2.8*   CA  8.7  8.5  8.5     No results for input(s): PH, PCO2, PO2, HCO3 in the last 72 hours. No results for input(s): LCAD, LAC in the last 72 hours. Assessment:  (Medical Decision Making)     Hospital Problems  Date Reviewed: 6/19/2017          Codes Class Noted POA    Hypernatremia-resolved ICD-10-CM: E87.0  ICD-9-CM: 276.0  6/16/2017 No        Atelectasis-continue IS ICD-10-CM: J98.11  ICD-9-CM: 518.0  6/16/2017 No        * (Principal)CAD (coronary artery disease) (Chronic)-per primary ICD-10-CM: I25.10  ICD-9-CM: 414.00  6/15/2017 Yes        S/P CABG x 2-per primary ICD-10-CM: Z95.1  ICD-9-CM: V45.81  6/15/2017 No    Overview Signed 6/15/2017 12:25 PM by Nikita Landis NP     6/15/17 Dr. Alessandra Light (CABG X 2)/ LIMA to LAD, RSVG to OM     L Greater saphenous VEIN HARVEST             Hypoxia-now on RA ICD-10-CM: R09.02  ICD-9-CM: 799.02  6/15/2017 No        Unstable angina (HCC) ICD-10-CM: I20.0  ICD-9-CM: 411.1  6/14/2017 Yes              Plan:  (Medical Decision Making)     --on RA  --continue IS  --continue PT/OOB  --ok to discharge from our standpoint, we will sign off. Please call with questions. More than 50% of the time documented was spent in face-to-face contact with the patient and in the care of the patient on the floor/unit where the patient is located. MARCO ANTONIO Marquez     Lung: CTA b/l. No wheezing  Heart S1 and S2 audible, no murmers or rubs appreciated  Other     Will sign off. I have spoken with and examined the patient. I have reviewed the history, examination, assessment, and plan and agree with the above. Dayron Olsen MD      This note was signed electronically.

## 2017-06-19 NOTE — PROGRESS NOTES
Verbal & Bedside shift change report given to Kalen Cameron (oncoming nurse) by Jamie Maloney (offgoing nurse). Report given with SBAR, Kardex, Intake/Output, MAR and Recent Results.

## 2017-06-19 NOTE — PROGRESS NOTES
Pt's /83, Lopressor 25 mg scheduled for this morning given now. HR 68. Pt asymptomatic. Will monitor.

## 2017-06-19 NOTE — PROGRESS NOTES
Verbal & Bedside shift change report given to Jennifer Jimenez (oncoming nurse) by Naveen Crum (offgoing nurse). Report given with SBAR, Kardex, Intake/Output, MAR and Recent Results.

## 2017-06-19 NOTE — DISCHARGE SUMMARY
Physician Discharge Summary     Patient: Miladis Walker MRN: [id number]  SSN: [id number]    YOB: 1940  Age: 68 y.o.   Sex: male       Admit Date: 6/14/2017    Discharge Date: 6/19/2017      Admitting Physician: Dereck Contreras MD     Discharge Physician: Paula Quintana NP    Admission Diagnoses: Atherosclerosis of native coronary artery of native heart with unstable angina pectoris [facility]) [I25.110]    Discharge Diagnoses:   Problem List as of 6/19/2017  Date Reviewed: 6/19/2017          Codes Class Noted - Resolved    Diabetes mellitus, new onset ([facility] (Chronic) ICD-10-CM: E11.9  ICD-9-CM: 250.00  6/19/2017 - Present    Overview Signed 6/19/2017  9:20 AM by Paula Quintana NP     A1c 6.6 on 6/14/17             Atelectasis ICD-10-CM: J98.11  ICD-9-CM: 518.0  6/16/2017 - Present        * (Principal)CAD (coronary artery disease) (Chronic) ICD-10-CM: I25.10  ICD-9-CM: 414.00  6/15/2017 - Present        S/P CABG x 2 ICD-10-CM: Z95.1  ICD-9-CM: V45.81  6/15/2017 - Present    Overview Signed 6/15/2017 12:25 PM by Kelsie Chatterjee NP     6/15/17 Dr. Chevy Nj (CABG X 2)/ LIMA to LAD, RSVG to OM     L Greater saphenous VEIN HARVEST             Unstable angina (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  ICD-9-CM: 257.2  11/25/2013 - Present        Elevated prostate specific antigen (PSA) ICD-10-CM: R97.20  ICD-9-CM: 790.93  11/25/2013 - Present        Impotence of organic origin ICD-10-CM: N52.9  ICD-9-CM: 607.84  11/25/2013 - Present        Unspecified essential hypertension ICD-10-CM: I10  ICD-9-CM: 401.9  11/25/2013 - Present        Pure hypercholesterolemia ICD-10-CM: E78.00  ICD-9-CM: 272.0  11/25/2013 - Present        RESOLVED: Hypernatremia ICD-10-CM: E87.0  ICD-9-CM: 276.0  6/16/2017 - 6/19/2017        RESOLVED: Hypoxia ICD-10-CM: R09.02  ICD-9-CM: 799.02  6/15/2017 - 6/19/2017        RESOLVED: Encounter for weaning from ventilator [facility]) ICD-10-CM: Z99.11  ICD-9-CM: V46.13  6/15/2017 - 6/16/2017               Procedures for this admission: Procedure(s):  CORONARY ARTERY BYPASS GRAFT (CABG X 2)/ LIMA   VEIN HARVEST  ESOPHAGEAL TRANS ECHOCARDIOGRAM    Discharged Condition: stable    Hospital Course: The patient is a 19-year-old man with   a history of hypertension, hyperlipidemia, who presented with   increasing exertional dyspnea, thought to be an anginal   equivalent. He had an abnormal nuclear stress test and left   heart cath showed critical left main coronary artery disease. The patient was admitted to the hospital, placed on heparin   drip. Preoperative workup was obtained. Echocardiogram showed   preserved left ventricular ejection fraction. No significant   valve pathology. The patient was referred for CABG. After   thorough discussion of all the risks and proposed benefits, the   patient agreed to proceed. 6/15/17 Dr Urszula Brush performed successful 1. CABG x2 (LIMA to LAD and reverse saphenous vein graft to   obtuse marginal). 2. Left thigh open greater saphenous vein harvest.    The patient tolerated the surgery well and transitioned to stepdown POD 1. His renal function bumped post op day 1 to 1.4 but then trended back to his baseline thereafter. His recovery has been uneventful with him remaining in NSR, afebrile, and hemodynamically stable. His A1c on admission was 6.6 and he was seen by the Diabetic Educator. We elected to start the patient on Metformin 500 mg BID- he will follow up with his PCP and provide blood glucose logs at his next visit. All incisions are healing. He has done well participating in PT and IS and weaned easily off oxygen. Pain is controlled on oral medication.   He is stable for discharge today. Consults: Cardiac Rehab, Cardiology, Cardiothoracic Surgery, Diabetic Education and Pulmonary/Critical Care    Significant Diagnostic Studies: labs  HgbA1C: 6.6  microbiology  radiology  cardiac graphics: Telemetry, ECGand Echocardiogram    Treatments:   Cardiac Meds: metoprolol and Toprol XL on dc, Cooper Ta resumed on DC, furosemide, amiodarone and Potassium  Anticoagulation: ASA and heparin  Statins: zocor  IV Hydration  Antibiotics: perioperative  Analgesia  Insulin: Humalog sliding scale and Metformin initiated  Respiratory Therapy: O2 and albuterol/atropine nebulizer  Therapies: PT  Procedures: Arteriogram ([facility])  Surgery: as above    Discharge Exam:    BP Temp Pulse Resp SpO2 Weight   17 0733 188/86 97.9 °F (36.6 °C) 68 18 94 % -   17 0702 - - - - 95 % -   17 0516 172/83 - 61 - - -   17 0443 186/86 - 68 - - -   17 0438 - - - - 95 % -   17 0403 - - - - - 184 lb 8 oz (83.7 kg)   17 0318 169/83 98 °F (36.7 °C) (!) 58 18 95 % -    Temp (24hrs), Av.9 °F (36.6 °C), Min:96.3 °F (35.7 °C), Max:98.8 °F (37.1 °C)        Heart: regular rate and rhythm, S1, S2 normal, no murmur, click, rub or gallop  Lung: clear to auscultation bilaterally  Incisions: Clean, dry, and intact    Disposition: Home Health Care [facility]     Patient Instructions:   Current Discharge Medication List      START taking these medications    Details   traMADol (ULTRAM) 50 mg tablet Take 1 Tab by mouth every six (6) hours as needed. Max Daily Amount: 200 mg. Qty: 30 Tab, Refills: 1      senna-docusate (PERICOLACE) 8.6-50 mg per tablet Take 2 Tabs by mouth two (2) times a day. Qty: 30 Tab, Refills: 0      metoprolol succinate (TOPROL-XL) 50 mg XL tablet Take 1 Tab by mouth daily.   Qty: 30 Tab, Refills: 5      acetaminophen (TYLENOL) 325 mg tablet Take for pain as needed as on label  Qty: 30 Tab, Refills: 0      furosemide (LASIX) 40 mg tablet Take 1 Tab by mouth daily for 6 days.  Lyle Esters: 6 Tab, Refills: 0      ferrous sulfate 325 mg (65 mg iron) tablet Take 1 Tab by mouth daily (with breakfast). Qty: 30 Tab, Refills: 0      potassium chloride (K-DUR, KLOR-CON) 20 mEq tablet Take 1 Tab by mouth daily for 6 days. Qty: 6 Tab, Refills: 0      metFORMIN (GLUCOPHAGE) 500 mg tablet Take 1 Tab by mouth two (2) times daily (with meals). Qty: 60 Tab, Refills: 1         CONTINUE these medications which have NOT CHANGED    Details   multivitamin (ONE A DAY) tablet Take 1 Tab by mouth daily. simvastatin (ZOCOR) 40 mg tablet Take 40 mg by mouth nightly. lisinopril-hydrochlorothiazide (PRINZIDE, ZESTORETIC) 20-12.5 mg per tablet Take  by mouth daily. aspirin 81 mg chewable tablet Take 81 mg by mouth daily. Reference discharge instructions as provided by nursing for diet, labs, medications, activity, wound care and any outpatient referrals. Follow-up Appointments   Procedures    FOLLOW UP VISIT Appointment in: [address] [facility] Cardiology [facility] Cardiology [facility]     Standing Status:   Standing     Number of Occurrences:   1     Order Specific Question:   Appointment in     Answer: One Week    FOLLOW UP VISIT Appointment in: Other (Specify) Dr Maurice Up     Standing Status:   Standing     Number of Occurrences:   1     Standing Expiration Date:   6/20/2017     Order Specific Question:   Appointment in     Answer: Other (Specify)    FOLLOW UP VISIT Appointment in: Two Weeks Follow up Primary Care for new onset Diabetes     Follow up Primary Care for new onset Diabetes     Standing Status:   Standing     Number of Occurrences:   1     Standing Expiration Date:   6/20/2017     Order Specific Question:   Appointment in     Answer:    Two Weeks        Signed:  Dylon Lynn NP  6/19/2017  9:58 AM

## 2017-06-19 NOTE — PROGRESS NOTES
POD 3 plan of care discussed with pt. He watched the post op CABG discharge video. Recovery diary provided and discussed with pt. Pt. Given a chance to ask questions and verbalized understanding.

## 2017-06-19 NOTE — DIABETES MGMT
Patient  s/p CABG x2 with LIMA, seen by diabetes educator. Patient voices a positive family history of diabetes. Patient was told he was pre-diabetic by his PCP last September and patient has been working on lifestyle modification. Current HgA1c 6.6 (eA). Patient's blood glucose was 142 on admission. Patient was placed on an insulin drip post op and has since transitioned to a regimen of SSI regular insulin. Blood glucose 120-154 yesterday with patient receiving 5 units of regular and 80 mg of solumedrol. Blood glucose 155 this morning. Pt given educational material, \"Survival Skills for Diabetes Management\", which was reviewed with pt. Explained basic physiology of diabetes, as well as causes, s/s, and treatments for hypoglycemia and hyperglycemia. Described the effects of poor glycemic control on the development of long-term complications such as renal, eye, nerve, and cardiovascular disease. Described proper diabetic foot care and the importance of checking feet qd. Educated re: effects of carbohydrates on blood glucose, the \"plate method\" of healthy meal planning, basics of healthy meal plan, and Consistent Carbohydrate Diet. Also explained the relationship between hyperglycemia and infection. Discussed target goals for blood glucose and A1C. Discussed the positive impact exercise has on glycemic control when he is cleared by MD for exercise. Pt verbalizes understanding of teaching. Explained the importance of blood glucose monitoring. Recommended frequency of daily blood glucose checks and to record in log book to take to PCP appointment. Provided blood glucose meter, strips, and instructed pt in use of meter. Pt was able to return demonstrate correct use of meter. Blood glucose was 209. Plan from provider is for patient to go home on Metformin and follow up with his PCP. Discussed Metformin benefits and side effects with patient. Educated patient that Metformin is free at [facility].  Patient is open to taking Metformin. Pt would likely benefit from continued outpatient diabetes education. Patient given contact information to [facility] to pursue when he feels up to it . Pt is agreeable to plan. Patient voices no questions.

## 2017-06-20 ENCOUNTER — HOME CARE VISIT (OUTPATIENT)
Dept: SCHEDULING | Facility: HOME HEALTH | Age: 77
End: 2017-06-20
Payer: MEDICARE

## 2017-06-20 VITALS
RESPIRATION RATE: 18 BRPM | DIASTOLIC BLOOD PRESSURE: 88 MMHG | SYSTOLIC BLOOD PRESSURE: 154 MMHG | HEART RATE: 62 BPM | TEMPERATURE: 97.2 F | OXYGEN SATURATION: 98 %

## 2017-06-20 PROCEDURE — 3331090002 HH PPS REVENUE DEBIT

## 2017-06-20 PROCEDURE — G0299 HHS/HOSPICE OF RN EA 15 MIN: HCPCS

## 2017-06-20 PROCEDURE — 3331090001 HH PPS REVENUE CREDIT

## 2017-06-20 PROCEDURE — 400013 HH SOC

## 2017-06-21 PROCEDURE — 3331090002 HH PPS REVENUE DEBIT

## 2017-06-21 PROCEDURE — 3331090001 HH PPS REVENUE CREDIT

## 2017-06-22 ENCOUNTER — HOME CARE VISIT (OUTPATIENT)
Dept: SCHEDULING | Facility: HOME HEALTH | Age: 77
End: 2017-06-22
Payer: MEDICARE

## 2017-06-22 VITALS
HEART RATE: 72 BPM | RESPIRATION RATE: 17 BRPM | TEMPERATURE: 98.6 F | SYSTOLIC BLOOD PRESSURE: 124 MMHG | DIASTOLIC BLOOD PRESSURE: 78 MMHG

## 2017-06-22 VITALS
SYSTOLIC BLOOD PRESSURE: 158 MMHG | HEART RATE: 83 BPM | RESPIRATION RATE: 20 BRPM | TEMPERATURE: 97.8 F | DIASTOLIC BLOOD PRESSURE: 80 MMHG

## 2017-06-22 PROCEDURE — G0151 HHCP-SERV OF PT,EA 15 MIN: HCPCS

## 2017-06-22 PROCEDURE — 3331090002 HH PPS REVENUE DEBIT

## 2017-06-22 PROCEDURE — 3331090001 HH PPS REVENUE CREDIT

## 2017-06-22 PROCEDURE — G0299 HHS/HOSPICE OF RN EA 15 MIN: HCPCS

## 2017-06-23 ENCOUNTER — HOSPITAL ENCOUNTER (OUTPATIENT)
Dept: GENERAL RADIOLOGY | Age: 77
Discharge: HOME | End: 2017-06-23
Attending: INTERNAL MEDICINE
Payer: MEDICARE

## 2017-06-23 ENCOUNTER — HOSPITAL ENCOUNTER (OUTPATIENT)
Dept: LAB | Age: 77
Discharge: HOME | End: 2017-06-23
Attending: INTERNAL MEDICINE
Payer: MEDICARE

## 2017-06-23 DIAGNOSIS — R06.02 SHORTNESS OF BREATH: ICD-10-CM

## 2017-06-23 LAB
ANION GAP BLD CALC-SCNC: 12 MMOL/L
BASOPHILS # BLD AUTO: 0 K/UL (ref 0–0.2)
BASOPHILS # BLD: 0 % (ref 0–2)
BNP SERPL-MCNC: 191 PG/ML
BUN SERPL-MCNC: 25 MG/DL (ref 8–23)
CALCIUM SERPL-MCNC: 9.9 MG/DL (ref 8.3–10.4)
CHLORIDE SERPL-SCNC: 96 MMOL/L (ref 98–107)
CO2 SERPL-SCNC: 28 MMOL/L (ref 21–32)
CREAT SERPL-MCNC: 1.5 MG/DL (ref 0.8–1.5)
DIFFERENTIAL METHOD BLD: ABNORMAL
EOSINOPHIL # BLD: 0.3 K/UL (ref 0–0.8)
EOSINOPHIL NFR BLD: 2 % (ref 0.5–7.8)
ERYTHROCYTE [DISTWIDTH] IN BLOOD BY AUTOMATED COUNT: 14.1 % (ref 11.9–14.6)
GLUCOSE SERPL-MCNC: 122 MG/DL (ref 65–100)
HCT VFR BLD AUTO: 39.1 % (ref 41.1–50.3)
HGB BLD-MCNC: 13.2 G/DL (ref 13.6–17.2)
LYMPHOCYTES # BLD AUTO: 15 % (ref 13–44)
LYMPHOCYTES # BLD: 2.4 K/UL (ref 0.5–4.6)
MCH RBC QN AUTO: 30.1 PG (ref 26.1–32.9)
MCHC RBC AUTO-ENTMCNC: 33.8 G/DL (ref 31.4–35)
MCV RBC AUTO: 89.3 FL (ref 79.6–97.8)
MONOCYTES # BLD: 1.1 K/UL (ref 0.1–1.3)
MONOCYTES NFR BLD AUTO: 7 % (ref 4–12)
NEUTS SEG # BLD: 11.9 K/UL (ref 1.7–8.2)
NEUTS SEG NFR BLD AUTO: 76 % (ref 43–78)
PLATELET # BLD AUTO: 435 K/UL (ref 150–450)
PLATELET COMMENTS,PCOM: ABNORMAL
PMV BLD AUTO: 9 FL (ref 10.8–14.1)
POTASSIUM SERPL-SCNC: 4.2 MMOL/L (ref 3.5–5.1)
RBC # BLD AUTO: 4.38 M/UL (ref 4.23–5.67)
RBC MORPH BLD: ABNORMAL
SODIUM SERPL-SCNC: 136 MMOL/L (ref 136–145)
WBC # BLD AUTO: 15.7 K/UL (ref 4.3–11.1)
WBC MORPH BLD: ABNORMAL

## 2017-06-23 PROCEDURE — 85025 COMPLETE CBC W/AUTO DIFF WBC: CPT | Performed by: INTERNAL MEDICINE

## 2017-06-23 PROCEDURE — 36415 COLL VENOUS BLD VENIPUNCTURE: CPT | Performed by: INTERNAL MEDICINE

## 2017-06-23 PROCEDURE — 3331090001 HH PPS REVENUE CREDIT

## 2017-06-23 PROCEDURE — 71020 XR CHEST PA LAT: CPT

## 2017-06-23 PROCEDURE — 3331090002 HH PPS REVENUE DEBIT

## 2017-06-23 PROCEDURE — 83880 ASSAY OF NATRIURETIC PEPTIDE: CPT | Performed by: INTERNAL MEDICINE

## 2017-06-23 PROCEDURE — 80048 BASIC METABOLIC PNL TOTAL CA: CPT | Performed by: INTERNAL MEDICINE

## 2017-06-24 PROCEDURE — 3331090002 HH PPS REVENUE DEBIT

## 2017-06-24 PROCEDURE — 3331090001 HH PPS REVENUE CREDIT

## 2017-06-25 PROCEDURE — 3331090001 HH PPS REVENUE CREDIT

## 2017-06-25 PROCEDURE — 3331090002 HH PPS REVENUE DEBIT

## 2017-06-26 ENCOUNTER — TELEPHONE (OUTPATIENT)
Dept: HOME HEALTH SERVICES | Facility: HOME HEALTH | Age: 77
End: 2017-06-26

## 2017-06-26 PROCEDURE — 3331090001 HH PPS REVENUE CREDIT

## 2017-06-26 PROCEDURE — 3331090002 HH PPS REVENUE DEBIT

## 2017-06-27 ENCOUNTER — HOME CARE VISIT (OUTPATIENT)
Dept: SCHEDULING | Facility: HOME HEALTH | Age: 77
End: 2017-06-27
Payer: MEDICARE

## 2017-06-27 VITALS
TEMPERATURE: 98.6 F | OXYGEN SATURATION: 98 % | RESPIRATION RATE: 18 BRPM | DIASTOLIC BLOOD PRESSURE: 62 MMHG | SYSTOLIC BLOOD PRESSURE: 130 MMHG | HEART RATE: 75 BPM

## 2017-06-27 PROCEDURE — 3331090001 HH PPS REVENUE CREDIT

## 2017-06-27 PROCEDURE — G0299 HHS/HOSPICE OF RN EA 15 MIN: HCPCS

## 2017-06-27 PROCEDURE — 3331090002 HH PPS REVENUE DEBIT

## 2017-06-28 PROCEDURE — 3331090001 HH PPS REVENUE CREDIT

## 2017-06-28 PROCEDURE — 3331090002 HH PPS REVENUE DEBIT

## 2017-06-29 ENCOUNTER — HOME CARE VISIT (OUTPATIENT)
Dept: SCHEDULING | Facility: HOME HEALTH | Age: 77
End: 2017-06-29
Payer: MEDICARE

## 2017-06-29 PROCEDURE — 3331090002 HH PPS REVENUE DEBIT

## 2017-06-29 PROCEDURE — 3331090001 HH PPS REVENUE CREDIT

## 2017-06-29 PROCEDURE — G0299 HHS/HOSPICE OF RN EA 15 MIN: HCPCS

## 2017-06-30 ENCOUNTER — HOME CARE VISIT (OUTPATIENT)
Dept: HOME HEALTH SERVICES | Facility: HOME HEALTH | Age: 77
End: 2017-06-30
Payer: MEDICARE

## 2017-06-30 ENCOUNTER — HOSPITAL ENCOUNTER (OUTPATIENT)
Dept: LAB | Age: 77
Discharge: HOME | End: 2017-06-30
Payer: MEDICARE

## 2017-06-30 VITALS
TEMPERATURE: 97.1 F | DIASTOLIC BLOOD PRESSURE: 60 MMHG | RESPIRATION RATE: 18 BRPM | HEART RATE: 92 BPM | SYSTOLIC BLOOD PRESSURE: 119 MMHG

## 2017-06-30 VITALS — TEMPERATURE: 97.5 F | HEART RATE: 92 BPM | SYSTOLIC BLOOD PRESSURE: 80 MMHG | DIASTOLIC BLOOD PRESSURE: 48 MMHG

## 2017-06-30 LAB — BNP SERPL-MCNC: 103 PG/ML

## 2017-06-30 PROCEDURE — G0299 HHS/HOSPICE OF RN EA 15 MIN: HCPCS

## 2017-06-30 PROCEDURE — 83880 ASSAY OF NATRIURETIC PEPTIDE: CPT | Performed by: INTERNAL MEDICINE

## 2017-06-30 PROCEDURE — 3331090001 HH PPS REVENUE CREDIT

## 2017-06-30 PROCEDURE — 3331090002 HH PPS REVENUE DEBIT

## 2017-07-01 PROCEDURE — 3331090002 HH PPS REVENUE DEBIT

## 2017-07-01 PROCEDURE — 3331090001 HH PPS REVENUE CREDIT

## 2017-07-02 PROCEDURE — 3331090001 HH PPS REVENUE CREDIT

## 2017-07-02 PROCEDURE — 3331090002 HH PPS REVENUE DEBIT

## 2017-07-03 PROCEDURE — 3331090001 HH PPS REVENUE CREDIT

## 2017-07-03 PROCEDURE — 3331090002 HH PPS REVENUE DEBIT

## 2017-07-04 ENCOUNTER — HOME CARE VISIT (OUTPATIENT)
Dept: SCHEDULING | Facility: HOME HEALTH | Age: 77
End: 2017-07-04
Payer: MEDICARE

## 2017-07-04 VITALS
HEART RATE: 91 BPM | SYSTOLIC BLOOD PRESSURE: 160 MMHG | DIASTOLIC BLOOD PRESSURE: 80 MMHG | TEMPERATURE: 97.8 F | RESPIRATION RATE: 20 BRPM

## 2017-07-04 PROCEDURE — 3331090001 HH PPS REVENUE CREDIT

## 2017-07-04 PROCEDURE — G0299 HHS/HOSPICE OF RN EA 15 MIN: HCPCS

## 2017-07-04 PROCEDURE — 3331090002 HH PPS REVENUE DEBIT

## 2017-07-05 ENCOUNTER — TELEPHONE (OUTPATIENT)
Dept: HOME HEALTH SERVICES | Facility: HOME HEALTH | Age: 77
End: 2017-07-05

## 2017-07-05 PROCEDURE — 3331090002 HH PPS REVENUE DEBIT

## 2017-07-05 PROCEDURE — 3331090001 HH PPS REVENUE CREDIT

## 2017-07-05 NOTE — TELEPHONE ENCOUNTER
Mr. Bassem Tanner said he was doing really well. His dizziness is now gone since MD stopped the metoprolol and the fursemide. He is staying with the Prinzide. I reviewed meds. No other changes and no issues at this time. I asked him to call with any medication questions.

## 2017-07-06 PROCEDURE — 3331090002 HH PPS REVENUE DEBIT

## 2017-07-06 PROCEDURE — 3331090001 HH PPS REVENUE CREDIT

## 2017-07-07 ENCOUNTER — HOME CARE VISIT (OUTPATIENT)
Dept: SCHEDULING | Facility: HOME HEALTH | Age: 77
End: 2017-07-07
Payer: MEDICARE

## 2017-07-07 VITALS
RESPIRATION RATE: 18 BRPM | HEART RATE: 88 BPM | DIASTOLIC BLOOD PRESSURE: 72 MMHG | SYSTOLIC BLOOD PRESSURE: 146 MMHG | TEMPERATURE: 98.7 F | OXYGEN SATURATION: 99 %

## 2017-07-07 PROCEDURE — 3331090001 HH PPS REVENUE CREDIT

## 2017-07-07 PROCEDURE — 3331090002 HH PPS REVENUE DEBIT

## 2017-07-07 PROCEDURE — G0299 HHS/HOSPICE OF RN EA 15 MIN: HCPCS

## 2017-07-08 PROCEDURE — 3331090001 HH PPS REVENUE CREDIT

## 2017-07-08 PROCEDURE — 3331090002 HH PPS REVENUE DEBIT

## 2017-07-09 PROCEDURE — 3331090002 HH PPS REVENUE DEBIT

## 2017-07-09 PROCEDURE — 3331090001 HH PPS REVENUE CREDIT

## 2017-07-10 PROCEDURE — 3331090001 HH PPS REVENUE CREDIT

## 2017-07-10 PROCEDURE — 3331090002 HH PPS REVENUE DEBIT

## 2017-07-11 PROCEDURE — 3331090001 HH PPS REVENUE CREDIT

## 2017-07-11 PROCEDURE — 3331090002 HH PPS REVENUE DEBIT

## 2017-07-12 PROCEDURE — 3331090002 HH PPS REVENUE DEBIT

## 2017-07-12 PROCEDURE — 3331090001 HH PPS REVENUE CREDIT

## 2017-07-13 ENCOUNTER — HOME CARE VISIT (OUTPATIENT)
Dept: HOME HEALTH SERVICES | Facility: HOME HEALTH | Age: 77
End: 2017-07-13
Payer: MEDICARE

## 2017-07-13 PROCEDURE — 3331090002 HH PPS REVENUE DEBIT

## 2017-07-13 PROCEDURE — 3331090001 HH PPS REVENUE CREDIT

## 2017-07-14 ENCOUNTER — TELEPHONE (OUTPATIENT)
Dept: HOME HEALTH SERVICES | Facility: HOME HEALTH | Age: 77
End: 2017-07-14

## 2017-07-14 ENCOUNTER — HOME CARE VISIT (OUTPATIENT)
Dept: SCHEDULING | Facility: HOME HEALTH | Age: 77
End: 2017-07-14
Payer: MEDICARE

## 2017-07-14 VITALS
TEMPERATURE: 97.4 F | SYSTOLIC BLOOD PRESSURE: 138 MMHG | RESPIRATION RATE: 18 BRPM | HEART RATE: 80 BPM | DIASTOLIC BLOOD PRESSURE: 64 MMHG

## 2017-07-14 PROCEDURE — 3331090002 HH PPS REVENUE DEBIT

## 2017-07-14 PROCEDURE — G0299 HHS/HOSPICE OF RN EA 15 MIN: HCPCS

## 2017-07-14 PROCEDURE — 3331090001 HH PPS REVENUE CREDIT

## 2017-07-14 NOTE — TELEPHONE ENCOUNTER
Mr. Eduard March said he had good visit with Dr. Sonia Gonzalez yesterday and was released to drive and increase activity. No medication changes. I asked if he was going to cardiac rehab. He said no one had mentioned to him, so I told him about it. I told him his cardiologist usually would recommend to him when he was ready. I recommended he ask his cardiologist if MD forgot to mention to him. He sounded very . I asked him to call with any medication questions or issues.

## 2017-07-15 PROCEDURE — 3331090002 HH PPS REVENUE DEBIT

## 2017-07-15 PROCEDURE — 3331090001 HH PPS REVENUE CREDIT

## 2017-07-16 PROCEDURE — 3331090001 HH PPS REVENUE CREDIT

## 2017-07-16 PROCEDURE — 3331090002 HH PPS REVENUE DEBIT

## 2017-07-17 PROCEDURE — 3331090001 HH PPS REVENUE CREDIT

## 2017-07-17 PROCEDURE — 3331090002 HH PPS REVENUE DEBIT

## 2017-07-18 PROCEDURE — 3331090001 HH PPS REVENUE CREDIT

## 2017-07-18 PROCEDURE — 3331090002 HH PPS REVENUE DEBIT

## 2017-07-19 PROCEDURE — 3331090001 HH PPS REVENUE CREDIT

## 2017-07-19 PROCEDURE — 3331090002 HH PPS REVENUE DEBIT

## 2017-07-20 PROCEDURE — 3331090002 HH PPS REVENUE DEBIT

## 2017-07-20 PROCEDURE — 3331090001 HH PPS REVENUE CREDIT

## 2017-08-03 NOTE — ADDENDUM NOTE
Addendum  created 08/03/17 1257 by Luciana Tate MD    Anesthesia Intra Blocks edited, Child order released for a procedure order, Sign clinical note

## 2017-08-03 NOTE — ANESTHESIA PROCEDURE NOTES
AMANDA  Date/Time: 6/15/2017 7:37 AM  Ordering Provider: Linden Petty    Procedure Details: probe placement only    Site marked, Timeout performed, 07:24  Risks and benefits discussed with the patient and plans are to proceed    Procedure Note  AMANDA probe introduced and advanced to mid-esophagus without encountering resistance or difficulty. No apparent complications.   Performed by: Clemencia Mariee by: Linden Petty

## 2019-11-20 PROBLEM — I65.23 BILATERAL CAROTID ARTERY STENOSIS: Status: ACTIVE | Noted: 2019-11-20

## 2021-01-14 ENCOUNTER — HOSPITAL ENCOUNTER (OUTPATIENT)
Dept: CT IMAGING | Age: 81
Discharge: HOME | End: 2021-01-14
Attending: INTERNAL MEDICINE
Payer: MEDICARE

## 2021-01-14 DIAGNOSIS — I77.9 CAROTID ARTERY DISEASE, UNSPECIFIED LATERALITY, UNSPECIFIED TYPE (HCC): ICD-10-CM

## 2021-01-14 DIAGNOSIS — I65.23 BILATERAL CAROTID ARTERY STENOSIS: ICD-10-CM

## 2021-01-14 LAB — CREAT BLD-MCNC: 1 MG/DL (ref 0.8–1.5)

## 2021-01-14 PROCEDURE — 74011000636 HC RX REV CODE- 636: Performed by: INTERNAL MEDICINE

## 2021-01-14 PROCEDURE — 74011000258 HC RX REV CODE- 258: Performed by: INTERNAL MEDICINE

## 2021-01-14 PROCEDURE — 82565 ASSAY OF CREATININE: CPT

## 2021-01-14 PROCEDURE — 70498 CT ANGIOGRAPHY NECK: CPT

## 2021-01-14 RX ORDER — SODIUM CHLORIDE 0.9 % (FLUSH) 0.9 %
10 SYRINGE (ML) INJECTION
Status: COMPLETED | OUTPATIENT
Start: 2021-01-14 | End: 2021-01-14

## 2021-01-14 RX ADMIN — SODIUM CHLORIDE 100 ML: 900 INJECTION, SOLUTION INTRAVENOUS at 09:01

## 2021-01-14 RX ADMIN — Medication 10 ML: at 09:01

## 2021-01-14 RX ADMIN — IOPAMIDOL 50 ML: 755 INJECTION, SOLUTION INTRAVENOUS at 09:01

## 2021-02-09 ENCOUNTER — ANESTHESIA EVENT (OUTPATIENT)
Dept: SURGERY | Age: 81
DRG: 039 | End: 2021-02-09
Payer: MEDICARE

## 2021-02-09 ENCOUNTER — HOSPITAL ENCOUNTER (OUTPATIENT)
Dept: SURGERY | Age: 81
Discharge: HOME | End: 2021-02-09
Payer: MEDICARE

## 2021-02-09 VITALS
DIASTOLIC BLOOD PRESSURE: 72 MMHG | RESPIRATION RATE: 16 BRPM | HEIGHT: 66 IN | WEIGHT: 158.4 LBS | OXYGEN SATURATION: 100 % | BODY MASS INDEX: 25.46 KG/M2 | HEART RATE: 56 BPM | TEMPERATURE: 97.5 F | SYSTOLIC BLOOD PRESSURE: 165 MMHG

## 2021-02-09 LAB
ANION GAP SERPL CALC-SCNC: 4 MMOL/L (ref 7–16)
BASOPHILS # BLD: 0.1 K/UL (ref 0–0.2)
BASOPHILS NFR BLD: 1 % (ref 0–2)
BUN SERPL-MCNC: 22 MG/DL (ref 8–23)
CALCIUM SERPL-MCNC: 9.6 MG/DL (ref 8.3–10.4)
CHLORIDE SERPL-SCNC: 108 MMOL/L (ref 98–107)
CO2 SERPL-SCNC: 27 MMOL/L (ref 21–32)
CREAT SERPL-MCNC: 1.07 MG/DL (ref 0.8–1.5)
DIFFERENTIAL METHOD BLD: ABNORMAL
EOSINOPHIL # BLD: 0.2 K/UL (ref 0–0.8)
EOSINOPHIL NFR BLD: 3 % (ref 0.5–7.8)
ERYTHROCYTE [DISTWIDTH] IN BLOOD BY AUTOMATED COUNT: 12.5 % (ref 11.9–14.6)
GLUCOSE SERPL-MCNC: 96 MG/DL (ref 65–100)
HCT VFR BLD AUTO: 44 % (ref 41.1–50.3)
HGB BLD-MCNC: 14.5 G/DL (ref 13.6–17.2)
IMM GRANULOCYTES # BLD AUTO: 0 K/UL (ref 0–0.5)
IMM GRANULOCYTES NFR BLD AUTO: 0 % (ref 0–5)
LYMPHOCYTES # BLD: 2 K/UL (ref 0.5–4.6)
LYMPHOCYTES NFR BLD: 31 % (ref 13–44)
MCH RBC QN AUTO: 30.1 PG (ref 26.1–32.9)
MCHC RBC AUTO-ENTMCNC: 33 G/DL (ref 31.4–35)
MCV RBC AUTO: 91.5 FL (ref 79.6–97.8)
MONOCYTES # BLD: 0.5 K/UL (ref 0.1–1.3)
MONOCYTES NFR BLD: 8 % (ref 4–12)
NEUTS SEG # BLD: 3.7 K/UL (ref 1.7–8.2)
NEUTS SEG NFR BLD: 57 % (ref 43–78)
NRBC # BLD: 0 K/UL (ref 0–0.2)
PLATELET # BLD AUTO: 298 K/UL (ref 150–450)
PMV BLD AUTO: 9 FL (ref 9.4–12.3)
POTASSIUM SERPL-SCNC: 4.6 MMOL/L (ref 3.5–5.1)
RBC # BLD AUTO: 4.81 M/UL (ref 4.23–5.6)
SODIUM SERPL-SCNC: 139 MMOL/L (ref 138–145)
WBC # BLD AUTO: 6.5 K/UL (ref 4.3–11.1)

## 2021-02-09 PROCEDURE — 85025 COMPLETE CBC W/AUTO DIFF WBC: CPT

## 2021-02-09 PROCEDURE — 80048 BASIC METABOLIC PNL TOTAL CA: CPT

## 2021-02-09 RX ORDER — BISMUTH SUBSALICYLATE 262 MG
1 TABLET,CHEWABLE ORAL DAILY
COMMUNITY
End: 2021-11-09

## 2021-02-09 NOTE — PERIOP NOTES
Recent Results (from the past 12 hour(s))   CBC WITH AUTOMATED DIFF    Collection Time: 02/09/21 10:48 AM   Result Value Ref Range    WBC 6.5 4.3 - 11.1 K/uL    RBC 4.81 4.23 - 5.6 M/uL    HGB 14.5 13.6 - 17.2 g/dL    HCT 44.0 41.1 - 50.3 %    MCV 91.5 79.6 - 97.8 FL    MCH 30.1 26.1 - 32.9 PG    MCHC 33.0 31.4 - 35.0 g/dL    RDW 12.5 11.9 - 14.6 %    PLATELET 401 786 - 678 K/uL    MPV 9.0 (L) 9.4 - 12.3 FL    ABSOLUTE NRBC 0.00 0.0 - 0.2 K/uL    DF AUTOMATED      NEUTROPHILS 57 43 - 78 %    LYMPHOCYTES 31 13 - 44 %    MONOCYTES 8 4.0 - 12.0 %    EOSINOPHILS 3 0.5 - 7.8 %    BASOPHILS 1 0.0 - 2.0 %    IMMATURE GRANULOCYTES 0 0.0 - 5.0 %    ABS. NEUTROPHILS 3.7 1.7 - 8.2 K/UL    ABS. LYMPHOCYTES 2.0 0.5 - 4.6 K/UL    ABS. MONOCYTES 0.5 0.1 - 1.3 K/UL    ABS. EOSINOPHILS 0.2 0.0 - 0.8 K/UL    ABS. BASOPHILS 0.1 0.0 - 0.2 K/UL    ABS. IMM.  GRANS. 0.0 0.0 - 0.5 K/UL   METABOLIC PANEL, BASIC    Collection Time: 02/09/21 10:48 AM   Result Value Ref Range    Sodium 139 138 - 145 mmol/L    Potassium 4.6 3.5 - 5.1 mmol/L    Chloride 108 (H) 98 - 107 mmol/L    CO2 27 21 - 32 mmol/L    Anion gap 4 (L) 7 - 16 mmol/L    Glucose 96 65 - 100 mg/dL    BUN 22 8 - 23 MG/DL    Creatinine 1.07 0.8 - 1.5 MG/DL    GFR est AA >60 >60 ml/min/1.73m2    GFR est non-AA >60 >60 ml/min/1.73m2    Calcium 9.6 8.3 - 10.4 MG/DL

## 2021-02-09 NOTE — ANESTHESIA PREPROCEDURE EVALUATION
Anesthetic History   No history of anesthetic complications            Review of Systems / Medical History  Patient summary reviewed, nursing notes reviewed and pertinent labs reviewed    Pulmonary  Within defined limits                 Neuro/Psych   Within defined limits           Cardiovascular    Hypertension: well controlled          CAD, PAD (85% stenosis of LCA - asymptomatic), CABG ('17) and hyperlipidemia    Exercise tolerance: >4 METS  Comments: Known normal EF   GI/Hepatic/Renal  Within defined limits              Endo/Other    Diabetes (diet controlled): well controlled, type 2         Other Findings   Comments: BPH           Physical Exam    Airway  Mallampati: II  TM Distance: 4 - 6 cm  Neck ROM: decreased range of motion   Mouth opening: Normal     Cardiovascular    Rhythm: regular  Rate: normal    Murmur: Grade 3, Aortic area     Dental    Dentition: Upper partial plate     Pulmonary  Breath sounds clear to auscultation               Abdominal  GI exam deferred       Other Findings            Anesthetic Plan    ASA: 3  Anesthesia type: general    Monitoring Plan: Arterial line      Induction: Intravenous  Anesthetic plan and risks discussed with: Patient and Spouse

## 2021-02-09 NOTE — PERIOP NOTES
Patient verified name and     Order for consent  found in EHR and matches case posting; patient verified. Type 3 surgery, Walk in assessment complete. Labs per surgeon: CBC and BMP; results pending. Labs per anesthesia protocol: T/S DOS;   EKG: EKG 20    Dr Lore Maravilla assess pt and reviewed chart, no new orders received. Patient COVID test date 21 after PAT assessment completed. The testing center is located at the . [address], [address] [facility]. If appointment is needed patient provided telephone number of [phone]. Hospital approved surgical skin cleanser and instructions given per hospital policy. Patient provided with and instructed on educational handouts including Guide to Surgery, Pain Management, Hand Hygiene, Blood Transfusion Education, and [place] [facility] Brochure. Patient answered medical/surgical history questions at their best of ability. All prior to admission medications documented in [facility]. Original medication prescription bottle were visualized during patient appointment. Patient instructed to hold all vitamins 7 days prior to surgery and NSAIDS 5 days prior to surgery, patient verbalized understanding. Patient teach back successful and patient demonstrates knowledge of instructions. PLEASE CONTINUE TAKING ALL PRESCRIPTION MEDICATIONS UP TO THE DAY OF SURGERY UNLESS OTHERWISE DIRECTED BELOW. DISCONTINUE all vitamins and supplements 7 days prior to surgery. DISCONTINUE Non-Steriodal Anti-Inflammatory (NSAIDS) such as Advil, Ibuprofen, Excedrin, Motrin, and Aleve 5 days prior to surgery. Home Medications to take  the day of surgery               Home Medications   to Hold     Vitamin D     Multivitamin     Comments      Covid test 21 @ [facility] the night before surgery and the morning of surgery. *Visitor policy of 1 visitor per patient discussed. Please do not bring home medications with you on the day of surgery unless otherwise directed by your nurse. If you are instructed to bring home medications, please give them to your nurse as they will be administered by the nursing staff. If you have any questions, please call [facility] [phone] or [facility] [phone]. A copy of this note was provided to the patient for reference.

## 2021-02-16 ENCOUNTER — HOSPITAL ENCOUNTER (INPATIENT)
Age: 81
LOS: 1 days | Discharge: HOME | DRG: 039 | End: 2021-02-17
Attending: SURGERY | Admitting: SURGERY
Payer: MEDICARE

## 2021-02-16 ENCOUNTER — ANESTHESIA (OUTPATIENT)
Dept: SURGERY | Age: 81
DRG: 039 | End: 2021-02-16
Payer: MEDICARE

## 2021-02-16 DIAGNOSIS — I65.21 STENOSIS OF RIGHT CAROTID ARTERY: ICD-10-CM

## 2021-02-16 PROBLEM — I65.29 CAROTID ARTERY STENOSIS: Status: ACTIVE | Noted: 2021-02-16

## 2021-02-16 LAB
ABO + RH BLD: NORMAL
ACT BLD: 224 SECS (ref 70–128)
BLOOD GROUP ANTIBODIES SERPL: NORMAL
GLUCOSE BLD STRIP.AUTO-MCNC: 101 MG/DL (ref 65–100)
SPECIMEN EXP DATE BLD: NORMAL

## 2021-02-16 PROCEDURE — 03CL0ZZ EXTIRPATION OF MATTER FROM LEFT INTERNAL CAROTID ARTERY, OPEN APPROACH: ICD-10-PCS | Performed by: SURGERY

## 2021-02-16 PROCEDURE — 65610000006 HC RM INTENSIVE CARE

## 2021-02-16 PROCEDURE — 74011000250 HC RX REV CODE- 250: Performed by: SURGERY

## 2021-02-16 PROCEDURE — 77030002987 HC SUT PROL J&J -B: Performed by: SURGERY

## 2021-02-16 PROCEDURE — 77030010507 HC ADH SKN DERMBND J&J -B: Performed by: SURGERY

## 2021-02-16 PROCEDURE — 77030005401 HC CATH RAD ARRO -A: Performed by: STUDENT IN AN ORGANIZED HEALTH CARE EDUCATION/TRAINING PROGRAM

## 2021-02-16 PROCEDURE — 2709999900 HC NON-CHARGEABLE SUPPLY

## 2021-02-16 PROCEDURE — 77030002996 HC SUT SLK J&J -A: Performed by: SURGERY

## 2021-02-16 PROCEDURE — 77030034888 HC SUT PROL 2 J&J -B: Performed by: SURGERY

## 2021-02-16 PROCEDURE — 51798 US URINE CAPACITY MEASURE: CPT

## 2021-02-16 PROCEDURE — C1768 GRAFT, VASCULAR: HCPCS | Performed by: SURGERY

## 2021-02-16 PROCEDURE — 77030039425 HC BLD LARYNG TRULITE DISP TELE -A: Performed by: STUDENT IN AN ORGANIZED HEALTH CARE EDUCATION/TRAINING PROGRAM

## 2021-02-16 PROCEDURE — 85347 COAGULATION TIME ACTIVATED: CPT

## 2021-02-16 PROCEDURE — 74011000250 HC RX REV CODE- 250: Performed by: STUDENT IN AN ORGANIZED HEALTH CARE EDUCATION/TRAINING PROGRAM

## 2021-02-16 PROCEDURE — 77030013794 HC KT TRNSDUC BLD EDWD -B: Performed by: STUDENT IN AN ORGANIZED HEALTH CARE EDUCATION/TRAINING PROGRAM

## 2021-02-16 PROCEDURE — 77030002986 HC SUT PROL J&J -A: Performed by: SURGERY

## 2021-02-16 PROCEDURE — 74011250636 HC RX REV CODE- 250/636: Performed by: NURSE PRACTITIONER

## 2021-02-16 PROCEDURE — 77030018673: Performed by: SURGERY

## 2021-02-16 PROCEDURE — 77030019908 HC STETH ESOPH SIMS -A: Performed by: STUDENT IN AN ORGANIZED HEALTH CARE EDUCATION/TRAINING PROGRAM

## 2021-02-16 PROCEDURE — 74011250637 HC RX REV CODE- 250/637: Performed by: ANESTHESIOLOGY

## 2021-02-16 PROCEDURE — 35301 RECHANNELING OF ARTERY: CPT | Performed by: SURGERY

## 2021-02-16 PROCEDURE — 77030031139 HC SUT VCRL2 J&J -A: Performed by: SURGERY

## 2021-02-16 PROCEDURE — 76060000036 HC ANESTHESIA 2.5 TO 3 HR: Performed by: SURGERY

## 2021-02-16 PROCEDURE — 77030037088 HC TUBE ENDOTRACH ORAL NSL COVD-A: Performed by: STUDENT IN AN ORGANIZED HEALTH CARE EDUCATION/TRAINING PROGRAM

## 2021-02-16 PROCEDURE — 86900 BLOOD TYPING SEROLOGIC ABO: CPT

## 2021-02-16 PROCEDURE — 77030019905 HC CATH URETH INTMIT MDII -A

## 2021-02-16 PROCEDURE — 77030010512 HC APPL CLP LIG J&J -C: Performed by: SURGERY

## 2021-02-16 PROCEDURE — 77030037400 HC ADH TISS HI VISC EXOFIN CHMP -B: Performed by: SURGERY

## 2021-02-16 PROCEDURE — 74011000258 HC RX REV CODE- 258: Performed by: SURGERY

## 2021-02-16 PROCEDURE — 74011250636 HC RX REV CODE- 250/636: Performed by: STUDENT IN AN ORGANIZED HEALTH CARE EDUCATION/TRAINING PROGRAM

## 2021-02-16 PROCEDURE — 2709999900 HC NON-CHARGEABLE SUPPLY: Performed by: SURGERY

## 2021-02-16 PROCEDURE — 74011000250 HC RX REV CODE- 250: Performed by: NURSE ANESTHETIST, CERTIFIED REGISTERED

## 2021-02-16 PROCEDURE — 77030013292 HC BOWL MX PRSM J&J -A: Performed by: STUDENT IN AN ORGANIZED HEALTH CARE EDUCATION/TRAINING PROGRAM

## 2021-02-16 PROCEDURE — 77030040356 HC CORD BPLR FRCP COVD -A: Performed by: SURGERY

## 2021-02-16 PROCEDURE — 74011250636 HC RX REV CODE- 250/636: Performed by: ANESTHESIOLOGY

## 2021-02-16 PROCEDURE — 77030012407 HC DRN WND BARD -B: Performed by: SURGERY

## 2021-02-16 PROCEDURE — 76210000006 HC OR PH I REC 0.5 TO 1 HR: Performed by: SURGERY

## 2021-02-16 PROCEDURE — 82962 GLUCOSE BLOOD TEST: CPT

## 2021-02-16 PROCEDURE — 74011250636 HC RX REV CODE- 250/636: Performed by: SURGERY

## 2021-02-16 PROCEDURE — 77030040506 HC DRN WND MDII -A: Performed by: SURGERY

## 2021-02-16 PROCEDURE — 77030002519 HC INSRT CLMP FIBRA STLTH AMR -B: Performed by: SURGERY

## 2021-02-16 PROCEDURE — 76010000171 HC OR TIME 2 TO 2.5 HR INTENSV-TIER 1: Performed by: SURGERY

## 2021-02-16 PROCEDURE — 77030040922 HC BLNKT HYPOTHRM STRY -A: Performed by: STUDENT IN AN ORGANIZED HEALTH CARE EDUCATION/TRAINING PROGRAM

## 2021-02-16 PROCEDURE — 03UL0KZ SUPPLEMENT LEFT INTERNAL CAROTID ARTERY WITH NONAUTOLOGOUS TISSUE SUBSTITUTE, OPEN APPROACH: ICD-10-PCS | Performed by: SURGERY

## 2021-02-16 DEVICE — XENOSURE BIOLOGIC PATCH, 0.8CM X 8CM, EIFU
Type: IMPLANTABLE DEVICE | Site: CAROTID | Status: FUNCTIONAL
Brand: XENOSURE BIOLOGIC PATCH

## 2021-02-16 RX ORDER — SODIUM CHLORIDE, SODIUM LACTATE, POTASSIUM CHLORIDE, CALCIUM CHLORIDE 600; 310; 30; 20 MG/100ML; MG/100ML; MG/100ML; MG/100ML
INJECTION, SOLUTION INTRAVENOUS
Status: DISCONTINUED | OUTPATIENT
Start: 2021-02-16 | End: 2021-02-16 | Stop reason: HOSPADM

## 2021-02-16 RX ORDER — LIDOCAINE HYDROCHLORIDE 10 MG/ML
0.1 INJECTION INFILTRATION; PERINEURAL AS NEEDED
Status: DISCONTINUED | OUTPATIENT
Start: 2021-02-16 | End: 2021-02-16 | Stop reason: HOSPADM

## 2021-02-16 RX ORDER — SODIUM CHLORIDE 0.9 % (FLUSH) 0.9 %
5-40 SYRINGE (ML) INJECTION AS NEEDED
Status: DISCONTINUED | OUTPATIENT
Start: 2021-02-16 | End: 2021-02-17 | Stop reason: HOSPADM

## 2021-02-16 RX ORDER — GLYCOPYRROLATE 0.2 MG/ML
INJECTION INTRAMUSCULAR; INTRAVENOUS AS NEEDED
Status: DISCONTINUED | OUTPATIENT
Start: 2021-02-16 | End: 2021-02-16 | Stop reason: HOSPADM

## 2021-02-16 RX ORDER — LIDOCAINE HYDROCHLORIDE 20 MG/ML
INJECTION, SOLUTION EPIDURAL; INFILTRATION; INTRACAUDAL; PERINEURAL AS NEEDED
Status: DISCONTINUED | OUTPATIENT
Start: 2021-02-16 | End: 2021-02-16 | Stop reason: HOSPADM

## 2021-02-16 RX ORDER — NICARDIPINE HYDROCHLORIDE 0.1 MG/ML
INJECTION INTRAVENOUS
Status: DISCONTINUED | OUTPATIENT
Start: 2021-02-16 | End: 2021-02-16 | Stop reason: HOSPADM

## 2021-02-16 RX ORDER — SODIUM CHLORIDE, SODIUM LACTATE, POTASSIUM CHLORIDE, CALCIUM CHLORIDE 600; 310; 30; 20 MG/100ML; MG/100ML; MG/100ML; MG/100ML
100 INJECTION, SOLUTION INTRAVENOUS CONTINUOUS
Status: DISCONTINUED | OUTPATIENT
Start: 2021-02-16 | End: 2021-02-16 | Stop reason: HOSPADM

## 2021-02-16 RX ORDER — EPHEDRINE SULFATE/0.9% NACL/PF 50 MG/5 ML
SYRINGE (ML) INTRAVENOUS AS NEEDED
Status: DISCONTINUED | OUTPATIENT
Start: 2021-02-16 | End: 2021-02-16 | Stop reason: HOSPADM

## 2021-02-16 RX ORDER — SODIUM CHLORIDE 9 MG/ML
INJECTION, SOLUTION INTRAVENOUS
Status: DISCONTINUED | OUTPATIENT
Start: 2021-02-16 | End: 2021-02-16 | Stop reason: HOSPADM

## 2021-02-16 RX ORDER — OXYCODONE HYDROCHLORIDE 5 MG/1
5 TABLET ORAL
Status: COMPLETED | OUTPATIENT
Start: 2021-02-16 | End: 2021-02-16

## 2021-02-16 RX ORDER — DEXAMETHASONE SODIUM PHOSPHATE 4 MG/ML
INJECTION, SOLUTION INTRA-ARTICULAR; INTRALESIONAL; INTRAMUSCULAR; INTRAVENOUS; SOFT TISSUE AS NEEDED
Status: DISCONTINUED | OUTPATIENT
Start: 2021-02-16 | End: 2021-02-16 | Stop reason: HOSPADM

## 2021-02-16 RX ORDER — ESMOLOL HYDROCHLORIDE 10 MG/ML
INJECTION INTRAVENOUS AS NEEDED
Status: DISCONTINUED | OUTPATIENT
Start: 2021-02-16 | End: 2021-02-16 | Stop reason: HOSPADM

## 2021-02-16 RX ORDER — SODIUM CHLORIDE 0.9 % (FLUSH) 0.9 %
5-40 SYRINGE (ML) INJECTION EVERY 8 HOURS
Status: DISCONTINUED | OUTPATIENT
Start: 2021-02-16 | End: 2021-02-17 | Stop reason: HOSPADM

## 2021-02-16 RX ORDER — SODIUM CHLORIDE 0.9 % (FLUSH) 0.9 %
5-40 SYRINGE (ML) INJECTION EVERY 8 HOURS
Status: DISCONTINUED | OUTPATIENT
Start: 2021-02-16 | End: 2021-02-16 | Stop reason: HOSPADM

## 2021-02-16 RX ORDER — NICARDIPINE HYDROCHLORIDE 0.1 MG/ML
INJECTION INTRAVENOUS AS NEEDED
Status: DISCONTINUED | OUTPATIENT
Start: 2021-02-16 | End: 2021-02-16 | Stop reason: HOSPADM

## 2021-02-16 RX ORDER — ONDANSETRON 2 MG/ML
INJECTION INTRAMUSCULAR; INTRAVENOUS AS NEEDED
Status: DISCONTINUED | OUTPATIENT
Start: 2021-02-16 | End: 2021-02-16 | Stop reason: HOSPADM

## 2021-02-16 RX ORDER — ASPIRIN 81 MG/1
81 TABLET ORAL DAILY
Status: DISCONTINUED | OUTPATIENT
Start: 2021-02-17 | End: 2021-02-17 | Stop reason: HOSPADM

## 2021-02-16 RX ORDER — OXYCODONE AND ACETAMINOPHEN 5; 325 MG/1; MG/1
1 TABLET ORAL
Status: DISCONTINUED | OUTPATIENT
Start: 2021-02-16 | End: 2021-02-17 | Stop reason: HOSPADM

## 2021-02-16 RX ORDER — NALOXONE HYDROCHLORIDE 0.4 MG/ML
0.4 INJECTION, SOLUTION INTRAMUSCULAR; INTRAVENOUS; SUBCUTANEOUS AS NEEDED
Status: DISCONTINUED | OUTPATIENT
Start: 2021-02-16 | End: 2021-02-17 | Stop reason: HOSPADM

## 2021-02-16 RX ORDER — PROPOFOL 10 MG/ML
INJECTION, EMULSION INTRAVENOUS AS NEEDED
Status: DISCONTINUED | OUTPATIENT
Start: 2021-02-16 | End: 2021-02-16 | Stop reason: HOSPADM

## 2021-02-16 RX ORDER — NEOSTIGMINE METHYLSULFATE 1 MG/ML
INJECTION, SOLUTION INTRAVENOUS AS NEEDED
Status: DISCONTINUED | OUTPATIENT
Start: 2021-02-16 | End: 2021-02-16 | Stop reason: HOSPADM

## 2021-02-16 RX ORDER — MORPHINE SULFATE 10 MG/ML
5 INJECTION, SOLUTION INTRAMUSCULAR; INTRAVENOUS
Status: DISCONTINUED | OUTPATIENT
Start: 2021-02-16 | End: 2021-02-17

## 2021-02-16 RX ORDER — PROTAMINE SULFATE 10 MG/ML
INJECTION, SOLUTION INTRAVENOUS AS NEEDED
Status: DISCONTINUED | OUTPATIENT
Start: 2021-02-16 | End: 2021-02-16 | Stop reason: HOSPADM

## 2021-02-16 RX ORDER — ACETAMINOPHEN 325 MG/1
650 TABLET ORAL
Status: DISCONTINUED | OUTPATIENT
Start: 2021-02-16 | End: 2021-02-17 | Stop reason: HOSPADM

## 2021-02-16 RX ORDER — FENTANYL CITRATE 50 UG/ML
INJECTION, SOLUTION INTRAMUSCULAR; INTRAVENOUS AS NEEDED
Status: DISCONTINUED | OUTPATIENT
Start: 2021-02-16 | End: 2021-02-16 | Stop reason: HOSPADM

## 2021-02-16 RX ORDER — SODIUM CHLORIDE 0.9 % (FLUSH) 0.9 %
5-40 SYRINGE (ML) INJECTION AS NEEDED
Status: DISCONTINUED | OUTPATIENT
Start: 2021-02-16 | End: 2021-02-16 | Stop reason: HOSPADM

## 2021-02-16 RX ORDER — CEFAZOLIN SODIUM/WATER 2 G/20 ML
2 SYRINGE (ML) INTRAVENOUS EVERY 8 HOURS
Status: COMPLETED | OUTPATIENT
Start: 2021-02-16 | End: 2021-02-17

## 2021-02-16 RX ORDER — CEFAZOLIN SODIUM/WATER 2 G/20 ML
2 SYRINGE (ML) INTRAVENOUS ONCE
Status: COMPLETED | OUTPATIENT
Start: 2021-02-16 | End: 2021-02-16

## 2021-02-16 RX ORDER — DEXTROSE MONOHYDRATE AND SODIUM CHLORIDE 5; .9 G/100ML; G/100ML
50 INJECTION, SOLUTION INTRAVENOUS CONTINUOUS
Status: DISCONTINUED | OUTPATIENT
Start: 2021-02-16 | End: 2021-02-17

## 2021-02-16 RX ORDER — ROCURONIUM BROMIDE 10 MG/ML
INJECTION, SOLUTION INTRAVENOUS AS NEEDED
Status: DISCONTINUED | OUTPATIENT
Start: 2021-02-16 | End: 2021-02-16 | Stop reason: HOSPADM

## 2021-02-16 RX ORDER — HYDROMORPHONE HYDROCHLORIDE 2 MG/ML
0.2 INJECTION, SOLUTION INTRAMUSCULAR; INTRAVENOUS; SUBCUTANEOUS
Status: DISCONTINUED | OUTPATIENT
Start: 2021-02-16 | End: 2021-02-16 | Stop reason: HOSPADM

## 2021-02-16 RX ORDER — BUPIVACAINE HYDROCHLORIDE 2.5 MG/ML
INJECTION, SOLUTION EPIDURAL; INFILTRATION; INTRACAUDAL AS NEEDED
Status: DISCONTINUED | OUTPATIENT
Start: 2021-02-16 | End: 2021-02-16 | Stop reason: HOSPADM

## 2021-02-16 RX ORDER — HEPARIN SODIUM 1000 [USP'U]/ML
INJECTION, SOLUTION INTRAVENOUS; SUBCUTANEOUS AS NEEDED
Status: DISCONTINUED | OUTPATIENT
Start: 2021-02-16 | End: 2021-02-16 | Stop reason: HOSPADM

## 2021-02-16 RX ORDER — NALOXONE HYDROCHLORIDE 0.4 MG/ML
0.04 INJECTION, SOLUTION INTRAMUSCULAR; INTRAVENOUS; SUBCUTANEOUS
Status: DISCONTINUED | OUTPATIENT
Start: 2021-02-16 | End: 2021-02-16 | Stop reason: HOSPADM

## 2021-02-16 RX ADMIN — LIDOCAINE HYDROCHLORIDE 60 MG: 20 INJECTION, SOLUTION EPIDURAL; INFILTRATION; INTRACAUDAL; PERINEURAL at 11:19

## 2021-02-16 RX ADMIN — Medication 10 ML: at 17:00

## 2021-02-16 RX ADMIN — ROCURONIUM BROMIDE 10 MG: 10 INJECTION, SOLUTION INTRAVENOUS at 12:45

## 2021-02-16 RX ADMIN — PROPOFOL 50 MG: 10 INJECTION, EMULSION INTRAVENOUS at 12:46

## 2021-02-16 RX ADMIN — ONDANSETRON 4 MG: 2 INJECTION INTRAMUSCULAR; INTRAVENOUS at 13:04

## 2021-02-16 RX ADMIN — PHENYLEPHRINE HYDROCHLORIDE 20 MCG: 10 INJECTION INTRAVENOUS at 12:53

## 2021-02-16 RX ADMIN — PHENYLEPHRINE HYDROCHLORIDE 60 MCG: 10 INJECTION INTRAVENOUS at 12:16

## 2021-02-16 RX ADMIN — NICARDIPINE HYDROCHLORIDE 100 MCG: 0.1 INJECTION, SOLUTION INTRAVENOUS at 11:29

## 2021-02-16 RX ADMIN — SODIUM CHLORIDE, SODIUM LACTATE, POTASSIUM CHLORIDE, AND CALCIUM CHLORIDE: 600; 310; 30; 20 INJECTION, SOLUTION INTRAVENOUS at 11:10

## 2021-02-16 RX ADMIN — PROTAMINE SULFATE 15 MG: 10 INJECTION, SOLUTION INTRAVENOUS at 12:57

## 2021-02-16 RX ADMIN — Medication 40 ML: at 22:46

## 2021-02-16 RX ADMIN — HEPARIN SODIUM 3000 UNITS: 1000 INJECTION, SOLUTION INTRAVENOUS; SUBCUTANEOUS at 12:06

## 2021-02-16 RX ADMIN — PHENYLEPHRINE HYDROCHLORIDE 60 MCG: 10 INJECTION INTRAVENOUS at 11:50

## 2021-02-16 RX ADMIN — ROCURONIUM BROMIDE 50 MG: 10 INJECTION, SOLUTION INTRAVENOUS at 11:20

## 2021-02-16 RX ADMIN — SODIUM CHLORIDE, SODIUM LACTATE, POTASSIUM CHLORIDE, AND CALCIUM CHLORIDE 100 ML/HR: 600; 310; 30; 20 INJECTION, SOLUTION INTRAVENOUS at 09:45

## 2021-02-16 RX ADMIN — PHENYLEPHRINE HYDROCHLORIDE 100 MCG: 10 INJECTION INTRAVENOUS at 11:34

## 2021-02-16 RX ADMIN — Medication 2 G: at 11:32

## 2021-02-16 RX ADMIN — SUGAMMADEX 200 MG: 100 INJECTION, SOLUTION INTRAVENOUS at 13:19

## 2021-02-16 RX ADMIN — PHENYLEPHRINE HYDROCHLORIDE 100 MCG: 10 INJECTION INTRAVENOUS at 12:52

## 2021-02-16 RX ADMIN — FENTANYL CITRATE 25 MCG: 50 INJECTION INTRAMUSCULAR; INTRAVENOUS at 11:16

## 2021-02-16 RX ADMIN — Medication 4 MG: at 13:12

## 2021-02-16 RX ADMIN — PHENYLEPHRINE HYDROCHLORIDE 20 MCG/MIN: 10 INJECTION INTRAVENOUS at 12:50

## 2021-02-16 RX ADMIN — NICARDIPINE HYDROCHLORIDE 2.5 MG/HR: 0.1 INJECTION, SOLUTION INTRAVENOUS at 12:12

## 2021-02-16 RX ADMIN — SODIUM CHLORIDE: 900 INJECTION, SOLUTION INTRAVENOUS at 11:27

## 2021-02-16 RX ADMIN — Medication 7.5 MG: at 11:56

## 2021-02-16 RX ADMIN — PHENYLEPHRINE HYDROCHLORIDE 120 MCG: 10 INJECTION INTRAVENOUS at 11:26

## 2021-02-16 RX ADMIN — PROPOFOL 50 MG: 10 INJECTION, EMULSION INTRAVENOUS at 11:29

## 2021-02-16 RX ADMIN — DEXAMETHASONE SODIUM PHOSPHATE 4 MG: 4 INJECTION, SOLUTION INTRAMUSCULAR; INTRAVENOUS at 11:30

## 2021-02-16 RX ADMIN — NICARDIPINE HYDROCHLORIDE 200 MCG: 0.1 INJECTION, SOLUTION INTRAVENOUS at 13:16

## 2021-02-16 RX ADMIN — NICARDIPINE HYDROCHLORIDE 100 MCG: 0.1 INJECTION, SOLUTION INTRAVENOUS at 12:20

## 2021-02-16 RX ADMIN — DEXTRAN 40 500 ML: 10 INJECTION, SOLUTION INTRAVENOUS at 15:00

## 2021-02-16 RX ADMIN — DEXTROSE MONOHYDRATE AND SODIUM CHLORIDE 50 ML/HR: 5; .9 INJECTION, SOLUTION INTRAVENOUS at 17:38

## 2021-02-16 RX ADMIN — GLYCOPYRROLATE 0.4 MG: 0.2 INJECTION, SOLUTION INTRAMUSCULAR; INTRAVENOUS at 13:12

## 2021-02-16 RX ADMIN — NICARDIPINE HYDROCHLORIDE 200 MCG: 0.1 INJECTION, SOLUTION INTRAVENOUS at 12:45

## 2021-02-16 RX ADMIN — PHENYLEPHRINE HYDROCHLORIDE 120 MCG: 10 INJECTION INTRAVENOUS at 11:38

## 2021-02-16 RX ADMIN — OXYCODONE 5 MG: 5 TABLET ORAL at 14:39

## 2021-02-16 RX ADMIN — PROTAMINE SULFATE 5 MG: 10 INJECTION, SOLUTION INTRAVENOUS at 12:54

## 2021-02-16 RX ADMIN — PHENYLEPHRINE HYDROCHLORIDE 60 MCG: 10 INJECTION INTRAVENOUS at 11:53

## 2021-02-16 RX ADMIN — PHENYLEPHRINE HYDROCHLORIDE 100 MCG: 10 INJECTION INTRAVENOUS at 12:49

## 2021-02-16 RX ADMIN — FENTANYL CITRATE 75 MCG: 50 INJECTION INTRAMUSCULAR; INTRAVENOUS at 11:19

## 2021-02-16 RX ADMIN — HEPARIN SODIUM 7000 UNITS: 1000 INJECTION, SOLUTION INTRAVENOUS; SUBCUTANEOUS at 11:56

## 2021-02-16 RX ADMIN — CEFAZOLIN 2 G: 10 INJECTION, POWDER, FOR SOLUTION INTRAVENOUS at 17:38

## 2021-02-16 RX ADMIN — PROTAMINE SULFATE 10 MG: 10 INJECTION, SOLUTION INTRAVENOUS at 12:55

## 2021-02-16 RX ADMIN — PHENYLEPHRINE HYDROCHLORIDE 60 MCG: 10 INJECTION INTRAVENOUS at 12:08

## 2021-02-16 RX ADMIN — NICARDIPINE HYDROCHLORIDE 200 MCG: 0.1 INJECTION, SOLUTION INTRAVENOUS at 11:59

## 2021-02-16 RX ADMIN — ESMOLOL HYDROCHLORIDE 20 MG: 10 INJECTION, SOLUTION INTRAVENOUS at 12:45

## 2021-02-16 RX ADMIN — PROTAMINE SULFATE 10 MG: 10 INJECTION, SOLUTION INTRAVENOUS at 12:59

## 2021-02-16 RX ADMIN — PROPOFOL 150 MG: 10 INJECTION, EMULSION INTRAVENOUS at 11:20

## 2021-02-16 NOTE — OP NOTES
[address] [facility] 
OPERATIVE REPORT Name:  Sherly Fontenot 
MR#:  [id number] :  1940 ACCOUNT #:  [de-identified] DATE OF SERVICE:  2021 PREOPERATIVE DIAGNOSIS:  Left carotid stenosis. POSTOPERATIVE DIAGNOSIS:  Left carotid stenosis. PROCEDURE PERFORMED:  Left carotid endarterectomy with bovine pericardial patch angioplasty. SURGEON:  Vanita Nguyen MD 
 
ASSISTANT:  None. ANESTHESIA:  General endotracheal. 
 
COMPLICATIONS:  None. SPECIMENS REMOVED:  None. IMPLANTS:  See chart. ESTIMATED BLOOD LOSS:  Less than 100 mL. DRAINS:  15 RIOS. DESCRIPTION OF PROCEDURE:  The patient was brought to the operating room and placed on operating room table in supine position. Following adequate general endotracheal anesthesia and time-out procedure, the left neck was draped and prepped in sterile fashion. A curvilinear incision was made along the anterior border of the sternocleidomastoid muscle. The wound was deepened using a Bovie to control bleeding. The platysma was divided throughout the length of the wound. Next, the internal jugular vein was identified. The facial branch was divided between silk ties. Next, the common carotid, internal carotid, and external carotid arteries were identified, mobilized, and encircled with vessel loops. The patient was then systemically heparinized. Following adequate circulation time as well as a therapeutic ACT, the internal and external common carotid arteries were occluded sequentially. A longitudinal arteriotomy was performed on the common carotid artery extended onto the internal carotid artery beyond the plaque. A 10-[place] shunt was then placed in the internal carotid artery and proximally, the common carotid artery restoring flow. Flow was confirmed. Next, the plaque was mobilized circumferentially at the proximal end of the arteriotomy. The plaque was then elevated moving it from proximal to distal.  The plaque in the external carotid artery was removed via eversion technique. The remainder of the plaque in the internal carotid artery was removed and a smooth tapered endpoint was achieved. Two 7-0 tacking sutures were placed. The endarterectomy site was then debrided of all movable debris. When this was completed, a bovine pericardial patch was sewn in position with running 6-0 Prolene suture brought out from each corner. Prior to completion of the patch closure, the shunt was removed and the native vessels were flushed and backbled.   The endarterectomy site was then irrigated with dextran solution. The patch closure was then completed, flow was restored to the external carotid artery and then to the internal carotid artery. Flow was confirmed by Doppler. A RIOS drain was placed. Protamine was administered to reverse heparin effect. Once hemostasis was achieved, the wound was closed in layers of Vicryl suture. Sterile dry dressings were applied. The patient was awakened from anesthesia and he was able to move all extremities on command. The patient tolerated the procedure well. No complications. Anai Patel MD 
 
 
[id number] 
D:  02/16/2021 13:53 T:  02/16/2021 18:49 JOB #:  [id number]

## 2021-02-16 NOTE — PROGRESS NOTES
's pre-procedure visit requested by patient. Conveyed care and concern for patient and family. Offered prayer as requested for patient, family, and staff.     Arnold Calvert MDiv, BS  Board Certified

## 2021-02-16 NOTE — PROGRESS NOTES
Dual skin assessment completed with Betty Bains RN. Skin is intact with no breakdown noted. Sacrum pink/blanchable, alevyn placed. L neck incision with dressing c/d/i. L radial arterial line intact, no issues.

## 2021-02-16 NOTE — PERIOP NOTES
TRANSFER - OUT REPORT:    Verbal report given to Faina Gutierrez RN on [address]  being transferred to [facility] for routine post - op       Report consisted of patients Situation, Background, Assessment and   Recommendations(SBAR). Information from the following report(s) SBAR, OR Summary, Procedure Summary, MAR, Recent Results and Cardiac Rhythm sinus chaim was reviewed with the receiving nurse. Lines:   Peripheral IV 02/16/21 Right Hand (Active)   Site Assessment Clean, dry, & intact 02/16/21 1500   Phlebitis Assessment 0 02/16/21 1500   Infiltration Assessment 0 02/16/21 1500   Dressing Status Clean, dry, & intact 02/16/21 1500   Dressing Type Transparent;Tape 02/16/21 1500   Hub Color/Line Status Green;Patent; Infusing 02/16/21 1500   Alcohol Cap Used No 02/16/21 1500       Peripheral IV 02/16/21 Left Forearm (Active)   Site Assessment Clean, dry, & intact 02/16/21 1500   Phlebitis Assessment 0 02/16/21 1500   Infiltration Assessment 0 02/16/21 1500   Dressing Status Clean, dry, & intact 02/16/21 1500   Dressing Type Transparent;Tape 02/16/21 1500   Hub Color/Line Status Green;Patent; Flushed 02/16/21 1500   Alcohol Cap Used No 02/16/21 1500       Arterial Line 02/16/21 Left Radial artery (Active)   Site Assessment Clean, dry, & intact 02/16/21 1500   Dressing Status Clean, dry, & intact 02/16/21 1500   Dressing Type Transparent;Tape 02/16/21 1500   Line Status Intact and in place 02/16/21 1500   Treatment Arm board off;Zeroed or re-zeroed 02/16/21 1500   Affected Extremity/Extremities Color distal to insertion site pink (or appropriate for race) 02/16/21 1500        Opportunity for questions and clarification was provided. Patient transported with:   Monitor  Registered Nurse    VTE prophylaxis orders have been written for [address]. Patient and family given floor number and nurses name. Family updated re: pt status after security code verified.

## 2021-02-16 NOTE — H&P
[address]   [address] [facility] FAX: 374 [facility]  1940          \[facility]\""  Arti Tsai is a [de-identified] y.o. male is here today for evaluation of carotid artery stenosis. He underwent R carotid endarterectomy by Dr. Jason Woods in 2012 and has done well since that time. He is a patient of Dr. Erma Corral at [facility] and they have been doing yearly carotid surveillance on him for the past several years. His most recent scan which was done in their office 12/31/20 showed L ICA stenosis of 70-99%. R ICA at 50-69% stenosis. He was referred here for further evaluation. He has a CTA of the neck scheduled for 1/14/21, later this week. He denies any new lateralizing neurologic symptoms. He is on ASA and a statin. He is a non-smoker.         MEDICAL HISTORY:        Past Medical History:   Diagnosis Date    Diabetes mellitus, new onset ([address] 6/19/2017     A1c 6.6 on 6/14/17    Elevated prostate specific antigen (PSA) 11/25/2013    Hypertrophy of prostate without urinary obstruction and other lower urinary tract symptoms (LUTS) 11/25/2013    Impotence of organic origin 11/25/2013    Other testicular hypofunction 11/25/2013    Pure hypercholesterolemia 11/25/2013    Unspecified essential hypertension 11/25/2013    Unstable angina ([address] 6/14/2017            SURGICAL HISTORY:         Past Surgical History:   Procedure Laterality Date    HX CAROTID ENDARTERECTOMY Right 2012    HX HERNIA REPAIR        HX OTHER SURGICAL         hydrocelectomy         Review of Systems   A comprehensive review of systems was negative except for that written in the HPI     ALLERGIES: No Known Allergies     CURRENT MEDICATIONS:        Current Outpatient Medications   Medication Sig Dispense    lisinopriL (PRINIVIL, ZESTRIL) 10 mg tablet Take 1 Tab by mouth daily.  90 Tab    simvastatin (ZOCOR) 40 mg tablet Take 40 mg by mouth nightly.      aspirin 81 mg chewable tablet Take 81 mg by mouth daily.        No current facility-administered medications for this visit.                IMAGING: US report reviewed from [facility] showing R ICA/CCA ratio of 1.9, 50-69% stenosis; L ICA/CCA ratio 2.2 with L ICA stenosis 70-99%.       PHYSICAL EXAM  VITALS:        Vitals:     01/11/21 1312 01/11/21 1316   BP: (!) 150/80 136/78   BP 1 Location: Left arm Right arm   BP Patient Position: Sitting Sitting   Pulse: 64     Temp: 97.4 °F (36.3 °C)     TempSrc: Temporal     SpO2: 97%     Weight: 156 lb (70.8 kg)     Height: 5' 6\" (1.676 m)           GENERAL: Well developed, well nourished, in NAD  HEAD/NECK: normocephalic, atraumatic, neck supple, no carotid bruits or JVD  LUNGS: clear to auscultation bilaterally  HEART: Regular rate and rhythm  ABDOMEN: soft, nontender, nondistended  EXTREMITIES: upper without CCE with palpable radial and brachial pulses. Lower extremities: palpable femoral, popliteal and distal pulses. No clubbing, cyanosis or edema. MUSCULOSKELETAL: normal gait  NEURO: sensation and strength grossly intact and symmetrical  PSYCH: alert and oriented to person, place and time        Assessment/Plan:  Mr. Mekhi Mosley is a very pleasant [de-identified]year old gentleman with a known hx of SHAHIDA, s/p CEA in 2012 by Dr. Hal Loera on the R. His most recent annual surveillance ultrasound showed 70-99% stenosis of the L side. He was referred to us for further evaluation. He is scheduled for CTA of the neck later this week. We will review these images and then will follow up with him by phone to give him our ongoing recommendations. He has no lateralizing neurologic symptoms. He is encouraged to continue taking ASA and statin. If he were to develop any new neurologic symptoms such as unilateral weakness, amaurosis fugax, slurred speech, or facial drooping he is instructed to get to the ER immediately for further evaluation and to notify us.  Discussed the pathology behind SHAHIDA and the potential differences between ultrasound and CTA findings, and that CTA would be helpful to really clarify exactly what degree of stenosis he really does have so we can make further recommendations and plans for his care. Both the patient and his wife were in agreement with this and all questions answered. Update:      CTA shows > 95% LICA stenosis. Planning for LCEA. H&P is current. Physical exam unchanged. I have discussed the need for the procedure with the patient. I have discussed the procedure with the patient/family in detail today including but not limited to the risk of death, bleeding requiring transfusion, infection, limb loss, or the necessity of subsequent procedures. All questions were answered. He understands and agree to proceed. Monika Ugarte MD        I have discussed the need for the procedure with the patient and family. I have discussed the procedure with the patient/family in detail today including but not limited to the risk of death, bleeding requiring transfusion, infection, limb loss, or the necessity of subsequent procedures. All questions were answered. They understand and agree to proceed. Monika Ugarte MD    Elements of this note have been dictated using speech recognition software.  As a result, errors of speech recognition may have occurred.     Carola Rodriguez, JACQUELYN

## 2021-02-16 NOTE — ANESTHESIA POSTPROCEDURE EVALUATION
Procedure(s):  CAROTID ARTERY ENDARTERECTOMY LEFT. general    Anesthesia Post Evaluation      Multimodal analgesia: multimodal analgesia used between 6 hours prior to anesthesia start to PACU discharge  Patient location during evaluation: PACU  Patient participation: complete - patient participated  Level of consciousness: awake  Pain management: adequate  Airway patency: patent  Anesthetic complications: no  Cardiovascular status: acceptable and hemodynamically stable  Respiratory status: acceptable  Hydration status: acceptable  Comments: Acceptable for discharge from PACU. Post anesthesia nausea and vomiting:  none  Final Post Anesthesia Temperature Assessment:  Normothermia (36.0-37.5 degrees C)      INITIAL Post-op Vital signs:   Vitals Value Taken Time   /51 02/16/21 1500   Temp 36.2 °C (97.1 °F) 02/16/21 1341   Pulse 46 02/16/21 1509   Resp 18 02/16/21 1440   SpO2 100 % 02/16/21 1509   Vitals shown include unvalidated device data.

## 2021-02-16 NOTE — PROGRESS NOTES
A follow up visit was made to the patient. Emotional support, spiritual presence and   prayer were provided for the patient and his wife, Adriana Shetty.       Jorie Saint, [address] [facility], [facility]

## 2021-02-16 NOTE — ANESTHESIA PROCEDURE NOTES
Arterial Line Placement    Start time: 2/16/2021 10:31 AM  End time: 2/16/2021 10:38 AM  Performed by: Kacie Rios CRNA  Authorized by:  Kacie Rios CRNA     Pre-Procedure  Indications:  Arterial pressure monitoring and blood sampling  Preanesthetic Checklist: patient identified, risks and benefits discussed, anesthesia consent, site marked, patient being monitored, timeout performed and patient being monitored    Timeout Time: 10:30        Procedure:   Prep:  Chlorhexidine  Seldinger Technique?: No    Orientation:  Left  Location:  Radial artery  Catheter size:  20 G  Number of attempts:  1  Cont Cardiac Output Sensor: No      Assessment:   Post-procedure:  Line secured  Patient Tolerance:  Patient tolerated the procedure well with no immediate complications

## 2021-02-16 NOTE — BRIEF OP NOTE
Brief Postoperative Note    Patient: Awilda Cazares  YOB: 1940  MRN: [id number]    Date of Procedure: 2/16/2021     Pre-Op Diagnosis: Bilateral carotid artery stenosis [I65.23]    Post-Op Diagnosis: Same as preoperative diagnosis. Procedure(s):  CAROTID ARTERY ENDARTERECTOMY LEFT    Surgeon(s):  Alvina Serrano MD    Surgical Assistant: None    Anesthesia: General     Estimated Blood Loss (mL): less than 155     Complications: None    Specimens: * No specimens in log *     Implants:   Implant Name Type Inv.  Item Serial No.  Lot No. LRB No. Used Action   GRAFT VASC W0.8XL8CM THK0.35-0.75MM CAR PERICARD PROC BOV -   GRAFT VASC W0.8XL8CM THK0.35-0.75MM CAR PERICARD PROC BOV 0000 [facility] [employer]_ [id number] Left 1 Implanted       Drains:   Roger-Goodwin Drain 02/16/21 Left Neck (Active)       Findings:     Electronically Signed by Krunal Florence MD on 2/16/2021 at 1:21 PM

## 2021-02-16 NOTE — PROGRESS NOTES
TRANSFER - IN REPORT:    Verbal report received from [facility] RN(name) on Prasanna  being received from PACU(unit) for routine post - op      Report consisted of patients Situation, Background, Assessment and   Recommendations(SBAR). Information from the following report(s) SBAR, OR Summary, Procedure Summary, Intake/Output, MAR, Recent Results, Med Rec Status, Cardiac Rhythm NSR and Dual Neuro Assessment was reviewed with the receiving nurse. Opportunity for questions and clarification was provided. Assessment completed upon patients arrival to unit and care assumed.

## 2021-02-17 VITALS
SYSTOLIC BLOOD PRESSURE: 125 MMHG | HEART RATE: 69 BPM | DIASTOLIC BLOOD PRESSURE: 59 MMHG | BODY MASS INDEX: 25.01 KG/M2 | TEMPERATURE: 98.3 F | WEIGHT: 155.6 LBS | OXYGEN SATURATION: 96 % | HEIGHT: 66 IN | RESPIRATION RATE: 14 BRPM

## 2021-02-17 LAB
ANION GAP SERPL CALC-SCNC: 6 MMOL/L (ref 7–16)
BUN SERPL-MCNC: 17 MG/DL (ref 8–23)
CALCIUM SERPL-MCNC: 7.9 MG/DL (ref 8.3–10.4)
CHLORIDE SERPL-SCNC: 112 MMOL/L (ref 98–107)
CO2 SERPL-SCNC: 23 MMOL/L (ref 21–32)
CREAT SERPL-MCNC: 1.17 MG/DL (ref 0.8–1.5)
ERYTHROCYTE [DISTWIDTH] IN BLOOD BY AUTOMATED COUNT: 12.8 % (ref 11.9–14.6)
GLUCOSE BLD STRIP.AUTO-MCNC: 150 MG/DL (ref 65–100)
GLUCOSE SERPL-MCNC: 143 MG/DL (ref 65–100)
HCT VFR BLD AUTO: 36.7 % (ref 41.1–50.3)
HGB BLD-MCNC: 11.9 G/DL (ref 13.6–17.2)
MCH RBC QN AUTO: 30.4 PG (ref 26.1–32.9)
MCHC RBC AUTO-ENTMCNC: 32.4 G/DL (ref 31.4–35)
MCV RBC AUTO: 93.9 FL (ref 79.6–97.8)
NRBC # BLD: 0 K/UL (ref 0–0.2)
PLATELET # BLD AUTO: 213 K/UL (ref 150–450)
PMV BLD AUTO: 9.1 FL (ref 9.4–12.3)
POTASSIUM SERPL-SCNC: 4.4 MMOL/L (ref 3.5–5.1)
RBC # BLD AUTO: 3.91 M/UL (ref 4.23–5.6)
SODIUM SERPL-SCNC: 141 MMOL/L (ref 136–145)
WBC # BLD AUTO: 11.5 K/UL (ref 4.3–11.1)

## 2021-02-17 PROCEDURE — 85027 COMPLETE CBC AUTOMATED: CPT

## 2021-02-17 PROCEDURE — 74011000250 HC RX REV CODE- 250: Performed by: SURGERY

## 2021-02-17 PROCEDURE — 74011250636 HC RX REV CODE- 250/636: Performed by: SURGERY

## 2021-02-17 PROCEDURE — 74011250637 HC RX REV CODE- 250/637: Performed by: SURGERY

## 2021-02-17 PROCEDURE — 97161 PT EVAL LOW COMPLEX 20 MIN: CPT

## 2021-02-17 PROCEDURE — 80048 BASIC METABOLIC PNL TOTAL CA: CPT

## 2021-02-17 PROCEDURE — 82962 GLUCOSE BLOOD TEST: CPT

## 2021-02-17 PROCEDURE — 97530 THERAPEUTIC ACTIVITIES: CPT

## 2021-02-17 PROCEDURE — 51798 US URINE CAPACITY MEASURE: CPT

## 2021-02-17 RX ADMIN — CEFAZOLIN 2 G: 10 INJECTION, POWDER, FOR SOLUTION INTRAVENOUS at 02:09

## 2021-02-17 RX ADMIN — Medication 40 ML: at 06:35

## 2021-02-17 RX ADMIN — ASPIRIN 81 MG: 81 TABLET, COATED ORAL at 09:00

## 2021-02-17 NOTE — PROGRESS NOTES
Care Management Interventions  Transition of Care Consult (CM Consult): Discharge Planning  Discharge Durable Medical Equipment: No  Physical Therapy Consult: No  Occupational Therapy Consult: No  Discharge Location  Discharge Placement: Home    Chart screened by  for discharge planning. No needs identified at this time. Please consult  if any new issues arise. Pt s/p L CEA 2/16/21. No CM needs identified.

## 2021-02-17 NOTE — PROGRESS NOTES
[address] [facility] [phone]      Vascular Surgery Progress Note    Admit Date: 2021  POD 1 Day Post-Op    Procedure:  Procedure(s):  CAROTID ARTERY ENDARTERECTOMY LEFT      Subjective:     Patient doing fine today. Pain is minimal, does not want any narcotics to go home on. Had issues with urinary retention, had to have straight cath last night. Objective:     Blood pressure 126/60, pulse 63, temperature 98.3 °F (36.8 °C), resp. rate 14, height 5' 6\" (1.676 m), weight 155 lb 9.6 oz (70.6 kg), SpO2 98 %. Temp (24hrs), Av.1 °F (36.7 °C), Min:97.1 °F (36.2 °C), Max:99 °F (37.2 °C)      Physical Exam:    Alert and oriented x 3, in NAD  Resp even/unlab on room air  Neuro intact  Surgical dsg clean and intact, RIOS drainage minimal and serosanguinous    Labs:   Recent Labs     21  0309   HGB 11.9*   WBC 11.5*   K 4.4   *       Data Review:   Current Facility-Administered Medications   Medication Dose Route Frequency    sodium chloride (NS) flush 5-40 mL  5-40 mL IntraVENous Q8H    sodium chloride (NS) flush 5-40 mL  5-40 mL IntraVENous PRN    acetaminophen (TYLENOL) tablet 650 mg  650 mg Oral Q4H PRN    oxyCODONE-acetaminophen (PERCOCET) 5-325 mg per tablet 1 Tab  1 Tab Oral Q4H PRN    naloxone (NARCAN) injection 0.4 mg  0.4 mg IntraVENous PRN    aspirin delayed-release tablet 81 mg  81 mg Oral DAILY     Assessment:     Active Problems:    Bilateral carotid artery stenosis (2019)      Carotid artery stenosis (2021)        Plan/Recommendations/Medical Decision Making:     Stop IVF, d/c RIOS drain  If able to void well, will discharge home; if not may need bernardo replaced and dc with it in.        Signed By: Ryland Tolentino NP     2021

## 2021-02-17 NOTE — PROGRESS NOTES
Bedside report received from MARIA G Ornelas. Patient resting in bed without complaint. VSS. Neuro assessment completed at this time. L neck site/dressing/drain, clean dry and intact. No issues.

## 2021-02-17 NOTE — PROGRESS NOTES
Physical Therapy Note:    Participated in interdisciplinary rounds in ICU/CCU and chart reviewed. Patient is experiencing decrease in function from baseline. Patient would benefit from skilled acute therapy to increase independence with self care/ADLs, strength, endurance, and functional mobility. Recommend PT/OT consult when medically stable and MD agrees.     Thank you for your consideration,  Gisela Bowser, PT, DPT

## 2021-02-17 NOTE — PROGRESS NOTES
ACUTE PHYSICAL THERAPY GOALS:  (Developed with and agreed upon by patient and/or caregiver.)  Discharge Goals:  (1.)Mr. Cabezas will ascend/descend 12 steps with use of handrailing with SUPERVISION within 7 treatment day(s). (2.)Mr. Cabezas will ambulate with INDEPENDENT for 500+ feet with the least restrictive device within 7 treatment day(s). ________________________________________________________________________________________________      PHYSICAL THERAPY ASSESSMENT: Initial Assessment PT Treatment Day # 1      Nicholas Merino is a [de-identified] y.o. male   PRIMARY DIAGNOSIS: <principal problem not specified>  Bilateral carotid artery stenosis [I65.23]  Carotid artery stenosis [I65.29]  Procedure(s) (LRB):  CAROTID ARTERY ENDARTERECTOMY LEFT (Left)  1 Day Post-Op  Reason for Referral:  Generalized weakness  ICD-10: Treatment Diagnosis: Generalized Muscle Weakness (M62.81)  INPATIENT: Payor: Axel Rushing / Plan: Claudia Vazquez / Product Type: [employer] Medicare /     ASSESSMENT:     REHAB RECOMMENDATIONS:   Recommendation to date pending progress:  Setting:   No further skilled therapy   Equipment:    None     PRIOR LEVEL OF FUNCTION:  (Prior to Hospitalization) INITIAL/CURRENT LEVEL OF FUNCTION:  (Most Recently Demonstrated)   Bed Mobility:   Independent  Sit to Stand:   Independent  Transfers:   Independent  Gait/Mobility:   Independent Bed Mobility:   Independent  Sit to Stand:   Independent  Transfers:   Supervision  Gait/Mobility:   Standby Assistance     ASSESSMENT:  Mr. Lisa Monique is functioning close to his baseline at this time. Pt ambulated 250 ft with SBA Pt is safe to return home from PT standpoint. No further skilled PT interventions indicated at this time. SUBJECTIVE:   Mr. Lisa Monique states, \"Im feeling good. \"    SOCIAL HISTORY/LIVING ENVIRONMENT:   Lives with wife  1 story home  4 steps, 8 steps  Independent at baseline     OBJECTIVE:     PAIN: VITAL SIGNS: LINES/DRAINS:   Pre Treatment: Pain Screen  Pain Scale 1: Numeric (0 - 10)  Pain Intensity 1: 0  Post Treatment: 0/10     O2 Device: Room air     GROSS EVALUATION:   Within Functional Limits Abnormal/ Functional Abnormal/ Non-Functional (see comments) Not Tested Comments:   AROM [x] [] [] []    PROM [] [] [] [x]    Strength [x] [] [] []    Balance [x] [] [] []    Posture [x] [] [] []    Sensation [] [] [] [x]    Coordination [x] [] [] []    Tone [] [] [] [x]    Edema [] [] [] [x]    Activity Tolerance [] [x] [] [] At baseline, pt works out daily    [] [] [] []      COGNITION/  PERCEPTION: Intact Impaired   (see comments) Comments:   Orientation [x] []    Vision [x] []    Hearing [x] []    Command Following [x] []    Safety Awareness [x] []     [] []      MOBILITY: I Mod I S SBA CGA Min Mod Max Total  NT x2 Comments:   Bed Mobility    Rolling [x] [] [] [] [] [] [] [] [] [] []    Supine to Sit [x] [] [] [] [] [] [] [] [] [] []    Scooting [x] [] [] [] [] [] [] [] [] [] []    Sit to Supine [] [] [] [] [] [] [] [] [] [] []    Transfers    Sit to Stand [x] [] [] [] [] [] [] [] [] [] []    Bed to Chair [x] [] [] [] [] [] [] [] [] [] []    Stand to Sit [x] [] [] [] [] [] [] [] [] [] []    I=Independent, Mod I=Modified Independent, S=Supervision, SBA=Standby Assistance, CGA=Contact Guard Assistance,   Min=Minimal Assistance, Mod=Moderate Assistance, Max=Maximal Assistance, Total=Total Assistance, NT=Not Tested  GAIT: I Mod I S SBA CGA Min Mod Max Total  NT x2 Comments:   Level of Assistance [] [] [] [x] [] [] [] [] [] [] []    Distance 250 ft    DME None    Gait Quality Increased trunk sway, decreased stride length    Weightbearing Status N/A     I=Independent, Mod I=Modified Independent, S=Supervision, SBA=Standby Assistance, CGA=Contact Guard Assistance,   Min=Minimal Assistance, Mod=Moderate Assistance, Max=Maximal Assistance, Total=Total Assistance, NT=Not Tested    [employer] [address] [facility],[address] Floor Inpatient Short Form       How much difficulty does the patient currently have. .. Unable A Lot A Little None   1. Turning over in bed (including adjusting bedclothes, sheets and blankets)? [] 1   [] 2   [] 3   [x] 4   2. Sitting down on and standing up from a chair with arms ( e.g., wheelchair, bedside commode, etc.)   [] 1   [] 2   [] 3   [x] 4   3. Moving from lying on back to sitting on the side of the bed? [] 1   [] 2   [] 3   [x] 4   How much help from another person does the patient currently need. .. Total A Lot A Little None   4. Moving to and from a bed to a chair (including a wheelchair)? [] 1   [] 2   [] 3   [x] 4   5. Need to walk in hospital room? [] 1   [] 2   [] 3   [x] 4   6. Climbing 3-5 steps with a railing? [] 1   [] 2   [x] 3   [] 4   © 2007, Trustees of [address] [place] [address], under license to [employer]. All rights reserved     Score:  Initial: 23 Most Recent: X (Date: -- )    Interpretation of Tool:  Represents activities that are increasingly more difficult (i.e. Bed mobility, Transfers, Gait). PLAN:   FREQUENCY/DURATION: PT Plan of Care: (1x visit) for duration of hospital stay or until stated goals are met, whichever comes first.    PROBLEM LIST:   (Skilled intervention is medically necessary to address:)  1. Decreased Activity Tolerance  2. Decreased Strength   INTERVENTIONS PLANNED:   (Benefits and precautions of physical therapy have been discussed with the patient.)  1. Therapeutic Activity  2. Therapeutic Exercise/HEP  3. Neuromuscular Re-education  4. Gait Training  5. Manual Therapy  6. Education     TREATMENT:     EVALUATION: Low Complexity : (Untimed Charge)    TREATMENT:   ($$ Therapeutic Activity: 8-22 mins    )  Therapeutic Activity (10 Minutes): Therapeutic activity included Ambulation on level ground and Standing balance to improve functional Mobility, Strength and Activity tolerance.     AFTER TREATMENT POSITION/PRECAUTIONS:  Needs within reach, Visitors at bedside and sitting EOB    INTERDISCIPLINARY COLLABORATION:  RN/PCT    TOTAL TREATMENT DURATION:  PT Patient Time In/Time Out  Time In: 1320  Time Out: 1340    Nicholas Joshi PT, DPT

## 2021-02-17 NOTE — PROGRESS NOTES
A follow up visit was made to the patient. Emotional support, spiritual presence and   prayer were provided for the patient. He said that he was being discharged today.       Damion Gomez, [address] [facility], [facility]

## 2021-02-17 NOTE — PROGRESS NOTES
Pt has not voided since AM. Bladder scan performed. Retaining 477 ml. Attempted to use urinal with patient. After 30 min patient was still unable to void. Straight cath performed using sterile technique.  Output documented in flow sheets

## 2021-02-17 NOTE — DISCHARGE INSTRUCTIONS
DISCHARGE SUMMARY from Nurse    PATIENT INSTRUCTIONS:    After general anesthesia or intravenous sedation, for 24 hours or while taking prescription Narcotics:  · Limit your activities  · Do not drive and operate hazardous machinery  · Do not make important personal or business decisions  · Do  not drink alcoholic beverages  · If you have not urinated within 8 hours after discharge, please contact your surgeon on call. Report the following to your surgeon:  · Excessive pain, swelling, redness or odor of or around the surgical area  · Temperature over 100.5  · Nausea and vomiting lasting longer than 4 hours or if unable to take medications  · Any signs of decreased circulation or nerve impairment to extremity: change in color, persistent  numbness, tingling, coldness or increase pain  · Any questions    What to do at Home:  Recommended activity: Activity as tolerated and no driving or heavy lifting for 2 weeks. Ok to take off dressing Thursday Feb 18th, Ok to shower Sunday with light water pressure over head/neck area, be careful to avoid heavy water pressure over site directly. If you experience any of the following symptoms painful swelling, irritation, drainage at site or fever, please follow up with PCP and or Vascular Surgery. *  Please give a list of your current medications to your Primary Care Provider. *  Please update this list whenever your medications are discontinued, doses are      changed, or new medications (including over-the-counter products) are added. *  Please carry medication information at all times in case of emergency situations. These are general instructions for a healthy lifestyle:    No smoking/ No tobacco products/ Avoid exposure to second hand smoke  Surgeon General's Warning:  Quitting smoking now greatly reduces serious risk to your health.     Obesity, smoking, and sedentary lifestyle greatly increases your risk for illness    A healthy diet, regular physical exercise & weight monitoring are important for maintaining a healthy lifestyle    You may be retaining fluid if you have a history of heart failure or if you experience any of the following symptoms:  Weight gain of 3 pounds or more overnight or 5 pounds in a week, increased swelling in our hands or feet or shortness of breath while lying flat in bed. Please call your doctor as soon as you notice any of these symptoms; do not wait until your next office visit. The discharge information has been reviewed with the patient and spouse. The patient and spouse verbalized understanding. Discharge medications reviewed with the patient and spouse and appropriate educational materials and side effects teaching were provided.   ___________________________________________________________________________________________________________________________________

## 2021-03-02 PROBLEM — Z98.890 H/O CAROTID ENDARTERECTOMY: Status: ACTIVE | Noted: 2021-03-02

## 2021-03-05 NOTE — OP NOTES
[facility]  OPERATIVE REPORT    Name:  Rebecca Muñoz  MR#:  [id number]  :  1940  ACCOUNT #:  [de-identified]  DATE OF SERVICE:  2021    PREOPERATIVE DIAGNOSIS:  Left carotid stenosis. POSTOPERATIVE DIAGNOSIS:  Left carotid stenosis. PROCEDURE PERFORMED:  Left carotid endarterectomy with bovine pericardial patch angioplasty. SURGEON:  Huseyin Mendez MD    ASSISTANT:  None. ANESTHESIA:  General endotracheal.    COMPLICATIONS:  None. SPECIMENS REMOVED:  None. IMPLANTS:  See chart. ESTIMATED BLOOD LOSS:  Less than 100 mL. DRAINS:  15 RIOS. DESCRIPTION OF PROCEDURE:  The patient was brought to the operating room and placed on operating room table in supine position. Following adequate general endotracheal anesthesia and time-out procedure, the left neck was draped and prepped in sterile fashion. A curvilinear incision was made along the anterior border of the sternocleidomastoid muscle. The wound was deepened using a Bovie to control bleeding. The platysma was divided throughout the length of the wound. Next, the internal jugular vein was identified. The facial branch was divided between silk ties. Next, the common carotid, internal carotid, and external carotid arteries were identified, mobilized, and encircled with vessel loops. The patient was then systemically heparinized. Following adequate circulation time as well as a therapeutic ACT, the internal and external common carotid arteries were occluded sequentially. A longitudinal arteriotomy was performed on the common carotid artery extended onto the internal carotid artery beyond the plaque. A 10-[place] shunt was then placed in the internal carotid artery and proximally, the common carotid artery restoring flow. Flow was confirmed. Next, the plaque was mobilized circumferentially at the proximal end of the arteriotomy.   The plaque was then elevated moving it from proximal to distal.  The plaque in the external carotid artery was removed via eversion technique. The remainder of the plaque in the internal carotid artery was removed and a smooth tapered endpoint was achieved. Two 7-0 tacking sutures were placed. The endarterectomy site was then debrided of all movable debris. When this was completed, a bovine pericardial patch was sewn in position with running 6-0 Prolene suture brought out from each corner. Prior to completion of the patch closure, the shunt was removed and the native vessels were flushed and backbled. The endarterectomy site was then irrigated with dextran solution. The patch closure was then completed, flow was restored to the external carotid artery and then to the internal carotid artery. Flow was confirmed by Doppler. A RIOS drain was placed. Protamine was administered to reverse heparin effect. Once hemostasis was achieved, the wound was closed in layers of Vicryl suture. Sterile dry dressings were applied. The patient was awakened from anesthesia and he was able to move all extremities on command. The patient tolerated the procedure well. No complications.         Getachew Lowery MD      [id number]  D:  02/16/2021 13:53  T:  02/16/2021 18:49  JOB #:  [id number]

## 2021-04-14 ENCOUNTER — APPOINTMENT (RX ONLY)
Dept: URBAN - METROPOLITAN AREA CLINIC 349 | Facility: CLINIC | Age: 81
Setting detail: DERMATOLOGY
End: 2021-04-14

## 2021-04-14 DIAGNOSIS — L82.0 INFLAMED SEBORRHEIC KERATOSIS: ICD-10-CM

## 2021-04-14 DIAGNOSIS — L57.8 OTHER SKIN CHANGES DUE TO CHRONIC EXPOSURE TO NONIONIZING RADIATION: ICD-10-CM

## 2021-04-14 DIAGNOSIS — L82.1 OTHER SEBORRHEIC KERATOSIS: ICD-10-CM

## 2021-04-14 DIAGNOSIS — Z12.83 ENCOUNTER FOR SCREENING FOR MALIGNANT NEOPLASM OF SKIN: ICD-10-CM

## 2021-04-14 PROBLEM — C44.319 BASAL CELL CARCINOMA OF SKIN OF OTHER PARTS OF FACE: Status: ACTIVE | Noted: 2021-04-14

## 2021-04-14 PROBLEM — C44.01 BASAL CELL CARCINOMA OF SKIN OF LIP: Status: ACTIVE | Noted: 2021-04-14

## 2021-04-14 PROCEDURE — A4550 SURGICAL TRAYS: HCPCS

## 2021-04-14 PROCEDURE — 17281 DSTR MAL LS F/E/E/N/L/M .6-1: CPT | Mod: 76

## 2021-04-14 PROCEDURE — ? SHAVE REMOVAL AND DESTRUCTION

## 2021-04-14 PROCEDURE — 88305 TISSUE EXAM BY PATHOLOGIST: CPT

## 2021-04-14 PROCEDURE — ? SHAVE REMOVAL

## 2021-04-14 PROCEDURE — ? COUNSELING

## 2021-04-14 PROCEDURE — 99203 OFFICE O/P NEW LOW 30 MIN: CPT | Mod: 25

## 2021-04-14 PROCEDURE — 11311 SHAVE SKIN LESION 0.6-1.0 CM: CPT

## 2021-04-14 PROCEDURE — 17281 DSTR MAL LS F/E/E/N/L/M .6-1: CPT

## 2021-04-14 PROCEDURE — ? PATHOLOGY BILLING

## 2021-04-14 ASSESSMENT — LOCATION DETAILED DESCRIPTION DERM
LOCATION DETAILED: RIGHT SUPERIOR LATERAL MALAR CHEEK

## 2021-04-14 ASSESSMENT — LOCATION SIMPLE DESCRIPTION DERM
LOCATION SIMPLE: RIGHT CHEEK

## 2021-04-14 ASSESSMENT — LOCATION ZONE DERM
LOCATION ZONE: FACE

## 2021-04-14 NOTE — PROCEDURE: SHAVE REMOVAL AND DESTRUCTION
Detail Level: Detailed
Consent: Written consent was obtained and risks were reviewed including but not limited to scarring, infection, bleeding, scabbing, incomplete removal, nerve damage and allergy to anesthesia.
Bill For Surgical Tray: yes
Anesthesia Type: 2% lidocaine with epinephrine
Billing Type: Third-Party Bill
Size After Destruction (Required For Destruction Billing): 0.9
Notification Instructions: Patient will be notified of biopsy results. However, patient instructed to call the office if not contacted within 2 weeks.
Cautery Type: electrodesiccation
Anesthesia Volume In Cc: 0
Hemostasis: Electrocautery
Wound Care: Vaseline
Bill As?: Note: Bill Malignant Destruction If Path Confirms Malignant Lesion. Only Bill As Shave Removal If Path Comes Back Benign. Do Not Bill Shave Removal On Malignant Lesions.: Malignant Destruction
Accession #: dr lyn read
Bill 06244 For Specimen Handling/Conveyance To Laboratory?: no
Size After Destruction (Required For Destruction Billing): 0.8
Anesthesia Volume In Cc: 1
Post-Care Instructions: I reviewed with the patient in detail post-care instructions. Patient is to keep the biopsy site dry overnight, and then apply bacitracin twice daily until healed. Patient may apply hydrogen peroxide soaks to remove any crusting.  After the procedure, the patient was observed for 5-10 minutes and was oriented to,person, place and time and denied feeling dizzy, queasy and stated that they were not going to faint
Dressing: Band-Aid

## 2021-04-14 NOTE — PROCEDURE: PATHOLOGY BILLING
Immunohistochemistry (50022 and [id number]) billing is not performed here. Please use the Immunohistochemistry Stain Billing plan to accomplish this.

## 2021-04-14 NOTE — PROCEDURE: PATHOLOGY BILLING
Immunohistochemistry ([id number] and [id number]) billing is not performed here. Please use the Immunohistochemistry Stain Billing plan to accomplish this.

## 2021-04-14 NOTE — PROCEDURE: SHAVE REMOVAL
Consent was obtained from the patient. The risks and benefits to therapy were discussed in detail. Specifically, the risks of infection, scarring, bleeding, prolonged wound healing, incomplete removal, allergy to anesthesia, nerve injury and recurrence were addressed. Prior to the procedure, the treatment site was clearly identified and confirmed by the patient. All components of Universal Protocol/PAUSE Rule completed.
Biopsy Method: Dermablade
Medical Necessity Information: It is in your best interest to select a reason for this procedure from the list below. All of these items fulfill various CMS LCD requirements except the new and changing color options.
Hemostasis: Electrocautery
Notification Instructions: Patient will be notified of biopsy results. However, patient instructed to call the office if not contacted within 2 weeks.
Detail Level: Detailed
Bill 38848 For Specimen Handling/Conveyance To Laboratory?: no
Size Of Lesion In Cm (Required): 1
Bill For Surgical Tray: yes
Anesthesia Type: 2% lidocaine with epinephrine
Accession #: dr lyn read
Billing Type: Third-Party Bill
X Size Of Lesion In Cm (Optional): 0
Post-Care Instructions: I reviewed with the patient in detail post-care instructions. Patient is to keep the biopsy site dry overnight, and then apply vaseline twice daily until healed. Patient may apply hydrogen peroxide soaks to remove any crusting. After the procedure, the patient was observed for 5-10 minutes and was oriented to person, place and time and demied feeling dizzy, queasy, and stated that they did not feel that they were going to faint.
Medical Necessity Clause: This procedure was medically necessary because the lesion that was treated was: changing
Anesthesia Volume In Cc: 0.5
Wound Care: Vaseline

## 2021-04-14 NOTE — PROCEDURE: PATHOLOGY BILLING
Immunohistochemistry (97180 and [id number]) billing is not performed here. Please use the Immunohistochemistry Stain Billing plan to accomplish this. Immunohistochemistry ([id number] and [id number]) billing is not performed here. Please use the Immunohistochemistry Stain Billing plan to accomplish this.

## 2021-07-14 ENCOUNTER — HOSPITAL ENCOUNTER (OUTPATIENT)
Dept: LAB | Age: 81
Discharge: HOME | End: 2021-07-14
Payer: MEDICARE

## 2021-07-14 DIAGNOSIS — E78.00 PURE HYPERCHOLESTEROLEMIA: ICD-10-CM

## 2021-07-14 LAB
ALBUMIN SERPL-MCNC: 4 G/DL (ref 3.2–4.6)
ALBUMIN/GLOB SERPL: 1.2 {RATIO} (ref 1.2–3.5)
ALP SERPL-CCNC: 44 U/L (ref 50–136)
ALT SERPL-CCNC: 27 U/L (ref 12–65)
AST SERPL-CCNC: 19 U/L (ref 15–37)
BILIRUB DIRECT SERPL-MCNC: 0.1 MG/DL
BILIRUB SERPL-MCNC: 0.6 MG/DL (ref 0.2–1.1)
CHOLEST SERPL-MCNC: 147 MG/DL
GLOBULIN SER CALC-MCNC: 3.3 G/DL (ref 2.3–3.5)
HDLC SERPL-MCNC: 62 MG/DL (ref 40–60)
HDLC SERPL: 2.4 {RATIO}
LDLC SERPL CALC-MCNC: 60 MG/DL
PROT SERPL-MCNC: 7.3 G/DL (ref 6.3–8.2)
TRIGL SERPL-MCNC: 125 MG/DL (ref 35–150)
VLDLC SERPL CALC-MCNC: 25 MG/DL (ref 6–23)

## 2021-07-14 PROCEDURE — 80076 HEPATIC FUNCTION PANEL: CPT

## 2021-07-14 PROCEDURE — 36415 COLL VENOUS BLD VENIPUNCTURE: CPT

## 2021-07-14 PROCEDURE — 80061 LIPID PANEL: CPT

## 2021-07-15 NOTE — PROGRESS NOTES
Per Dr. Kellie Puentes; lipids look great and liver function is normal. Informed patient of lab results.

## 2021-12-08 ENCOUNTER — APPOINTMENT (RX ONLY)
Dept: URBAN - METROPOLITAN AREA CLINIC 349 | Facility: CLINIC | Age: 81
Setting detail: DERMATOLOGY
End: 2021-12-08

## 2021-12-08 DIAGNOSIS — L20.89 OTHER ATOPIC DERMATITIS: ICD-10-CM

## 2021-12-08 DIAGNOSIS — D485 NEOPLASM OF UNCERTAIN BEHAVIOR OF SKIN: ICD-10-CM

## 2021-12-08 PROBLEM — L20.84 INTRINSIC (ALLERGIC) ECZEMA: Status: ACTIVE | Noted: 2021-12-08

## 2021-12-08 PROBLEM — D48.5 NEOPLASM OF UNCERTAIN BEHAVIOR OF SKIN: Status: ACTIVE | Noted: 2021-12-08

## 2021-12-08 PROCEDURE — 99213 OFFICE O/P EST LOW 20 MIN: CPT | Mod: 25

## 2021-12-08 PROCEDURE — ? BIOPSY BY SHAVE METHOD

## 2021-12-08 PROCEDURE — 11103 TANGNTL BX SKIN EA SEP/ADDL: CPT

## 2021-12-08 PROCEDURE — 11102 TANGNTL BX SKIN SINGLE LES: CPT

## 2021-12-08 PROCEDURE — ? COUNSELING

## 2021-12-08 PROCEDURE — ? PRESCRIPTION

## 2021-12-08 RX ORDER — FLUTICASONE PROPIONATE 0.05 MG/G
OINTMENT TOPICAL
Qty: 60 | Refills: 2 | Status: ERX | COMMUNITY
Start: 2021-12-08

## 2021-12-08 RX ADMIN — FLUTICASONE PROPIONATE: 0.05 OINTMENT TOPICAL at 00:00

## 2021-12-08 ASSESSMENT — LOCATION DETAILED DESCRIPTION DERM
LOCATION DETAILED: LEFT INFERIOR LATERAL FOREHEAD
LOCATION DETAILED: RIGHT MID TEMPLE
LOCATION DETAILED: LEFT INFERIOR LATERAL FOREHEAD
LOCATION DETAILED: RIGHT MID TEMPLE

## 2021-12-08 ASSESSMENT — LOCATION SIMPLE DESCRIPTION DERM
LOCATION SIMPLE: RIGHT TEMPLE
LOCATION SIMPLE: RIGHT TEMPLE
LOCATION SIMPLE: LEFT FOREHEAD
LOCATION SIMPLE: LEFT FOREHEAD

## 2021-12-08 ASSESSMENT — LOCATION ZONE DERM
LOCATION ZONE: FACE
LOCATION ZONE: FACE

## 2021-12-08 NOTE — PROCEDURE: BIOPSY BY SHAVE METHOD
Hide Additional Anticipated Plan?: No
Type Of Destruction Used: Curettage
hallucinations
Billing Type: Third-Party Bill
Silver Nitrate Text: The wound bed was treated with silver nitrate after the biopsy was performed.
Cryotherapy Text: The wound bed was treated with cryotherapy after the biopsy was performed.
Anesthesia Volume In Cc (Will Not Render If 0): 0.5
Biopsy Method: Dermablade
Information: Selecting Yes will display possible errors in your note based on the variables you have selected. This validation is only offered as a suggestion for you. PLEASE NOTE THAT THE VALIDATION TEXT WILL BE REMOVED WHEN YOU FINALIZE YOUR NOTE. IF YOU WANT TO FAX A PRELIMINARY NOTE YOU WILL NEED TO TOGGLE THIS TO 'NO' IF YOU DO NOT WANT IT IN YOUR FAXED NOTE.
Dressing: bandage
Was A Bandage Applied: Yes
X Size Of Lesion In Cm: 0
Depth Of Biopsy: dermis
Electrodesiccation And Curettage Text: The wound bed was treated with electrodesiccation and curettage after the biopsy was performed.
Electrodesiccation Text: The wound bed was treated with electrodesiccation after the biopsy was performed.
Anesthesia Type: 1% lidocaine with epinephrine
Post-Care Instructions: I reviewed with the patient in detail post-care instructions. Patient is to keep the biopsy site dry overnight, and then apply bacitracin twice daily until healed. Patient may apply hydrogen peroxide soaks to remove any crusting.
Wound Care: Petrolatum
Detail Level: Detailed
Consent: Written consent was obtained and risks were reviewed including but not limited to scarring, infection, bleeding, scabbing, incomplete removal, nerve damage and allergy to anesthesia.
Notification Instructions: Patient will be notified of biopsy results. However, patient instructed to call the office if not contacted within 2 weeks.
Curettage Text: The wound bed was treated with curettage after the biopsy was performed.
Biopsy Type: H and E
X Size Of Lesion In Cm: 0.8
Hemostasis: Drysol

## 2022-01-07 ENCOUNTER — APPOINTMENT (RX ONLY)
Dept: URBAN - METROPOLITAN AREA CLINIC 349 | Facility: CLINIC | Age: 82
Setting detail: DERMATOLOGY
End: 2022-01-07

## 2022-01-07 PROBLEM — C44.319 BASAL CELL CARCINOMA OF SKIN OF OTHER PARTS OF FACE: Status: ACTIVE | Noted: 2022-01-07

## 2022-01-07 PROCEDURE — 17282 DSTR MAL LS F/E/E/N/L/M1.1-2: CPT

## 2022-01-07 PROCEDURE — 17282 DSTR MAL LS F/E/E/N/L/M1.1-2: CPT | Mod: 76

## 2022-01-07 PROCEDURE — ? CURETTAGE AND DESTRUCTION

## 2022-01-07 PROCEDURE — ? COUNSELING

## 2022-01-07 NOTE — PROCEDURE: CURETTAGE AND DESTRUCTION
Bill As A Line Item Or As Units: Line Item
Final Size Statement: The size of the lesion after curettage was
Add Ability To Document Additional Intralesional Injection: No
Consent was obtained from the patient. The risks, benefits and alternatives to therapy were discussed in detail. Specifically, the risks of infection, scarring, bleeding, prolonged wound healing, nerve injury, incomplete removal, allergy to anesthesia and recurrence were addressed. Alternatives to ED&C, such as: surgical removal and XRT were also discussed.  Prior to the procedure, the treatment site was clearly identified and confirmed by the patient. All components of Universal Protocol/PAUSE Rule completed.
Additional Information: (Optional): The wound was cleaned, and a pressure dressing was applied.  The patient received detailed post-op instructions.
Total Volume (Ccs): 1
Cautery Type: electrodesiccation
Size Of Lesion After Curettage: 1.5
Post-Care Instructions: I reviewed with the patient in detail post-care instructions. Patient is to keep the area dry for 48 hours, and not to engage in any swimming until the area is healed. Should the patient develop any fevers, chills, bleeding, severe pain patient will contact the office immediately. After the procedure, the patient was observed for 5-10 mins and was oriented to person, place anddenied feeling dizzy, queasy and stated that they did not feel that they were going to faint.            denied
Detail Level: Detailed
Number Of Curettages: 3
Anesthesia Type: 2% lidocaine with epinephrine
What Was Performed First?: Curettage
Size Of Lesion After Curettage: 1.7
Size Of Lesion In Cm: 1.2

## 2022-03-18 PROBLEM — I65.23 BILATERAL CAROTID ARTERY STENOSIS: Status: ACTIVE | Noted: 2019-11-20

## 2022-03-18 PROBLEM — Z98.890 H/O CAROTID ENDARTERECTOMY: Status: ACTIVE | Noted: 2021-03-02

## 2022-03-19 PROBLEM — I65.29 CAROTID ARTERY STENOSIS: Status: ACTIVE | Noted: 2021-02-16

## 2022-03-19 PROBLEM — J98.11 ATELECTASIS: Status: ACTIVE | Noted: 2017-06-16

## 2022-03-19 PROBLEM — I25.10 CAD (CORONARY ARTERY DISEASE): Status: ACTIVE | Noted: 2017-06-15

## 2022-03-19 PROBLEM — R06.02 SHORTNESS OF BREATH: Status: ACTIVE | Noted: 2017-06-01

## 2022-03-20 PROBLEM — Z95.1 S/P CABG X 2: Status: ACTIVE | Noted: 2017-06-15

## 2022-03-20 PROBLEM — I20.0 UNSTABLE ANGINA (HCC): Status: ACTIVE | Noted: 2017-06-14

## 2022-03-20 PROBLEM — E11.9 DIABETES MELLITUS, NEW ONSET (HCC): Status: ACTIVE | Noted: 2017-06-19

## 2022-07-19 ENCOUNTER — OFFICE VISIT (OUTPATIENT)
Dept: CARDIOLOGY CLINIC | Age: 82
End: 2022-07-19
Payer: MEDICARE

## 2022-07-19 VITALS
HEART RATE: 56 BPM | BODY MASS INDEX: 26.73 KG/M2 | HEIGHT: 66 IN | SYSTOLIC BLOOD PRESSURE: 150 MMHG | DIASTOLIC BLOOD PRESSURE: 78 MMHG | WEIGHT: 166.3 LBS

## 2022-07-19 DIAGNOSIS — I25.10 CORONARY ARTERY DISEASE INVOLVING NATIVE CORONARY ARTERY OF NATIVE HEART WITHOUT ANGINA PECTORIS: ICD-10-CM

## 2022-07-19 DIAGNOSIS — I10 ESSENTIAL HYPERTENSION: Primary | ICD-10-CM

## 2022-07-19 DIAGNOSIS — E78.00 PURE HYPERCHOLESTEROLEMIA: ICD-10-CM

## 2022-07-19 DIAGNOSIS — I65.23 BILATERAL CAROTID ARTERY STENOSIS: ICD-10-CM

## 2022-07-19 PROCEDURE — 99214 OFFICE O/P EST MOD 30 MIN: CPT | Performed by: INTERNAL MEDICINE

## 2022-07-19 PROCEDURE — 93000 ELECTROCARDIOGRAM COMPLETE: CPT | Performed by: INTERNAL MEDICINE

## 2022-07-19 PROCEDURE — 1123F ACP DISCUSS/DSCN MKR DOCD: CPT | Performed by: INTERNAL MEDICINE

## 2022-07-19 RX ORDER — SIMVASTATIN 40 MG
40 TABLET ORAL NIGHTLY
Qty: 90 TABLET | Refills: 3 | Status: SHIPPED | OUTPATIENT
Start: 2022-07-19

## 2022-07-19 RX ORDER — CLOPIDOGREL BISULFATE 75 MG/1
75 TABLET ORAL DAILY
Qty: 90 TABLET | Refills: 3 | Status: SHIPPED | OUTPATIENT
Start: 2022-07-19

## 2022-07-19 RX ORDER — LISINOPRIL 10 MG/1
10 TABLET ORAL DAILY
Qty: 90 TABLET | Refills: 3 | Status: SHIPPED | OUTPATIENT
Start: 2022-07-19

## 2022-07-19 NOTE — PROGRESS NOTES
(73.5 kg)   01/13/22 162 lb 8 oz (73.7 kg)       Medical problems and test results were reviewed with the patient today. ASSESSMENT and PLAN    1. Essential hypertension  Stable. Continue lisinopril and if bp elevated at home he will contact us but bp has been 120s at home    - EKG 12 lead    2. CAD (coronary artery disease)  Stable. Continue asa    - EKG 12 lead    3. Bilateral carotid artery stenosis  Stable. Continue follow up with Vascular    4. Dyslipidemia  Stable. Continue zocor         Return in about 6 months (around 1/19/2023).          Hector Gates MD  7/19/2022  9:27 AM

## 2022-08-10 ENCOUNTER — OFFICE VISIT (OUTPATIENT)
Dept: VASCULAR SURGERY | Age: 82
End: 2022-08-10
Payer: MEDICARE

## 2022-08-10 VITALS
TEMPERATURE: 97.7 F | WEIGHT: 166 LBS | HEIGHT: 66 IN | OXYGEN SATURATION: 94 % | SYSTOLIC BLOOD PRESSURE: 172 MMHG | BODY MASS INDEX: 26.68 KG/M2 | HEART RATE: 55 BPM | DIASTOLIC BLOOD PRESSURE: 80 MMHG

## 2022-08-10 DIAGNOSIS — I65.23 BILATERAL CAROTID ARTERY STENOSIS: Primary | ICD-10-CM

## 2022-08-10 DIAGNOSIS — Z98.890 H/O CAROTID ENDARTERECTOMY: ICD-10-CM

## 2022-08-10 PROCEDURE — 1123F ACP DISCUSS/DSCN MKR DOCD: CPT | Performed by: NURSE PRACTITIONER

## 2022-08-10 PROCEDURE — 99213 OFFICE O/P EST LOW 20 MIN: CPT | Performed by: NURSE PRACTITIONER

## 2022-08-10 ASSESSMENT — PATIENT HEALTH QUESTIONNAIRE - PHQ9
SUM OF ALL RESPONSES TO PHQ9 QUESTIONS 1 & 2: 0
1. LITTLE INTEREST OR PLEASURE IN DOING THINGS: 0
SUM OF ALL RESPONSES TO PHQ QUESTIONS 1-9: 0
2. FEELING DOWN, DEPRESSED OR HOPELESS: 0
SUM OF ALL RESPONSES TO PHQ QUESTIONS 1-9: 0

## 2022-08-10 NOTE — PROGRESS NOTES
DATE OF VISIT: 8/10/2022      [facility]  Zheng Evans is a 80 y.o. male who follows up for his 6 month carotid duplex study. He denies any new lateralizing neurological symptoms. He has undergone bilateral carotid endarterectomies. He remains on ASA, Plavix and Zocor. 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 [address] 06/14/2017    pt denies any CP or SOB         SURGICAL HISTORY:   Past Surgical History:   Procedure Laterality Date    CAROTID ENDARTERECTOMY Right 2012    COLONOSCOPY      HERNIA REPAIR Bilateral     OTHER SURGICAL HISTORY  as a child-age 1    chest surgery \"water on my chest\"     OTHER SURGICAL HISTORY      hydrocelectomy       Review of Systems:  Constitutional:   Negative for fevers and unexplained weight loss. Respiratory:   Negative for hemoptysis. Cardiovascular:   Negative except as noted in HPI. Musculoskeletal:  Negative for active, unexplained/severe joint pain. ALLERGIES: No Known Allergies    CURRENT MEDICATIONS:  Current Outpatient Medications   Medication Sig Dispense Refill    Multiple Vitamin (DAILY VITAMIN PO) Take by mouth      clopidogrel (PLAVIX) 75 MG tablet Take 1 tablet by mouth in the morning. 90 tablet 3    lisinopril (PRINIVIL;ZESTRIL) 10 MG tablet Take 1 tablet by mouth in the morning.  90 tablet 3    simvastatin (ZOCOR) 40 MG tablet Take 1 tablet by mouth nightly 90 tablet 3    aspirin 81 MG chewable tablet Take 81 mg by mouth       No current facility-administered medications for this visit. IMAGING: Interpretation Summary    Right Carotid: Patch observed in the right CCA/ICA system. Patient is post CEA. Peak systolic velocities and atherosclerotic plaque indicate a <50% restenosis in the ICA. Left Carotid: Patch observed in the left CCA/ICA system. Patient is post CEA. Peak systolic velocities and atherosclerotic plaque indicate a 50-69% restenosis in the ICA. The vertebral arteries are patent with antegrade flow bilaterally. Procedure Details    A gray scale, color and Doppler analysis ultrasound was performed. During the study longitudinal views were obtained. Pulsed wave doppler was performed. Overall the study quality was good. Cerebrovascular Findings      Right Carotid    Patient is s/p carotid endarterectomy. Carotid bulb has intimal thickening and heterogeneous plaque. There is mild stenosis in the right ICA (<50%). There is intimal thickening present in the right ICA. The right vertebral is antegrade. Left Carotid    Patient is s/p carotid endarterectomy. Carotid bulb has intimal thickening plaque. There is moderate stenosis in the left ICA (50-69%). The left ICA has heterogeneous plaque. The left vertebral is antegrade.        Carotid Measurements     Right PSV Right EDV Left PSV Left EDV   CCA Prox 117 cm/s        13.7 cm/s        103 cm/s        18.4 cm/s          CCA Dist 105 cm/s        17.6 cm/s        79.7 cm/s        17.6 cm/s          ICA Prox 267 cm/s        22.7 cm/s        154 cm/s        25.4 cm/s          ICA Mid 127 cm/s        20.4 cm/s        113 cm/s        23.8 cm/s          ICA Dist 104 cm/s        19.2 cm/s        95.8 cm/s        23.8 cm/s           cm/s        29.5 cm/s        240 cm/s        25.7 cm/s          Vertebral 60.5 cm/s        10.7 cm/s        49.6 cm/s        7.47 cm/s Procedure Staff    Technologist/Clinician: Girish Schultz  Supporting Staff: None  Performing Physician/Midlevel: None     Exam Completion Date/Time:            PHYSICAL EXAM  VITALS: BP (!) 172/80 (Site: Right Upper Arm)   Pulse 55   Temp 97.7 °F (36.5 °C) (Temporal)   Ht 5' 6\" (1.676 m)   Wt 166 lb (75.3 kg)   SpO2 94%   BMI 26.79 kg/m²       GENERAL: Well developed, well nourished, in NAD  HEAD/NECK: normocephalic, atraumatic, neck supple  MUSCULOSKELETAL: normal gait  NEURO: sensation and strength grossly intact and symmetrical  PSYCH: alert and oriented to person, place and time      Assessment/Plan: Patient is an 80year old male who follows up for his 6 month carotid duplex study. He denies any new, lateralizing neurological symptoms. He has undergone bilateral carotid endarterectomies. He remains on ASA, Plavix and Zocor. Patient has mild re-stenosis in the left carotid endarterectomy. Will continue to follow with 6 month carotid duplex studies. He is to present to the ER with any S/S of a stroke ie: slurred speech, facial drooping, amaurosis fugax or unilateral weakness.          Jasmin Barrios, APRN - CNP    20 minutes of time was spent on this encounter including chart review, assessment and evaluation

## 2022-09-14 ENCOUNTER — CLINICAL DOCUMENTATION (OUTPATIENT)
Dept: VASCULAR SURGERY | Age: 82
End: 2022-09-14

## 2022-09-14 NOTE — PROGRESS NOTES
[facility] may not be taking his medication Plavix    --spoke w/ pt which he stated gets it filled at [place] and taking.

## 2022-11-29 ENCOUNTER — CLINICAL DOCUMENTATION (OUTPATIENT)
Dept: VASCULAR SURGERY | Age: 82
End: 2022-11-29

## 2022-11-29 RX ORDER — CLOPIDOGREL BISULFATE 75 MG/1
TABLET ORAL
Qty: 30 TABLET | Refills: 0 | OUTPATIENT
Start: 2022-11-29

## 2022-11-29 NOTE — PROGRESS NOTES
Spoke with patient and advised him he needs to call Dr Bard Isaacs at Cardiology for his Plavix refill. Pt. Voiced understanding.

## 2022-11-30 RX ORDER — CLOPIDOGREL BISULFATE 75 MG/1
75 TABLET ORAL DAILY
Qty: 90 TABLET | Refills: 3 | Status: SHIPPED | OUTPATIENT
Start: 2022-11-30

## 2022-11-30 NOTE — TELEPHONE ENCOUNTER
Requested Prescriptions     Pending Prescriptions Disp Refills    clopidogrel (PLAVIX) 75 MG tablet 90 tablet 3     Sig: Take 1 tablet by mouth daily

## 2022-12-15 ENCOUNTER — APPOINTMENT (RX ONLY)
Dept: URBAN - METROPOLITAN AREA CLINIC 329 | Facility: CLINIC | Age: 82
Setting detail: DERMATOLOGY
End: 2022-12-15

## 2022-12-15 DIAGNOSIS — B35.3 TINEA PEDIS: ICD-10-CM | Status: INADEQUATELY CONTROLLED

## 2022-12-15 DIAGNOSIS — L57.0 ACTINIC KERATOSIS: ICD-10-CM

## 2022-12-15 PROBLEM — D48.5 NEOPLASM OF UNCERTAIN BEHAVIOR OF SKIN: Status: ACTIVE | Noted: 2022-12-15

## 2022-12-15 PROCEDURE — 11103 TANGNTL BX SKIN EA SEP/ADDL: CPT

## 2022-12-15 PROCEDURE — ? ADDITIONAL NOTES

## 2022-12-15 PROCEDURE — ? COUNSELING

## 2022-12-15 PROCEDURE — ? FULL BODY SKIN EXAM - DECLINED

## 2022-12-15 PROCEDURE — ? PRESCRIPTION

## 2022-12-15 PROCEDURE — ? LIQUID NITROGEN

## 2022-12-15 PROCEDURE — ? PRESCRIPTION MEDICATION MANAGEMENT

## 2022-12-15 PROCEDURE — 11102 TANGNTL BX SKIN SINGLE LES: CPT

## 2022-12-15 PROCEDURE — 99214 OFFICE O/P EST MOD 30 MIN: CPT | Mod: 25

## 2022-12-15 PROCEDURE — 17003 DESTRUCT PREMALG LES 2-14: CPT

## 2022-12-15 PROCEDURE — 17000 DESTRUCT PREMALG LESION: CPT | Mod: 59

## 2022-12-15 PROCEDURE — ? BIOPSY BY SHAVE METHOD

## 2022-12-15 RX ORDER — CICLOPIROX OLAMINE 7.7 MG/G
CREAM TOPICAL
Qty: 30 | Refills: 1 | Status: ERX | COMMUNITY
Start: 2022-12-15

## 2022-12-15 RX ADMIN — CICLOPIROX OLAMINE: 7.7 CREAM TOPICAL at 00:00

## 2022-12-15 ASSESSMENT — LOCATION DETAILED DESCRIPTION DERM
LOCATION DETAILED: LEFT SUPERIOR MEDIAL FOREHEAD
LOCATION DETAILED: RIGHT SUPERIOR MEDIAL FOREHEAD
LOCATION DETAILED: LEFT SUPERIOR FOREHEAD
LOCATION DETAILED: LEFT CENTRAL FRONTAL SCALP
LOCATION DETAILED: LEFT DORSAL FOOT
LOCATION DETAILED: MEDIAL FRONTAL SCALP
LOCATION DETAILED: LEFT INFERIOR CENTRAL MALAR CHEEK
LOCATION DETAILED: LEFT CENTRAL MALAR CHEEK
LOCATION DETAILED: LEFT SUPERIOR PARIETAL SCALP
LOCATION DETAILED: LEFT INFERIOR CENTRAL MALAR CHEEK
LOCATION DETAILED: RIGHT SUPERIOR FOREHEAD
LOCATION DETAILED: LEFT MEDIAL FRONTAL SCALP
LOCATION DETAILED: LEFT SUPERIOR FOREHEAD
LOCATION DETAILED: SUPERIOR MID FOREHEAD
LOCATION DETAILED: LEFT MEDIAL FRONTAL SCALP

## 2022-12-15 ASSESSMENT — LOCATION SIMPLE DESCRIPTION DERM
LOCATION SIMPLE: LEFT FOREHEAD
LOCATION SIMPLE: RIGHT FOREHEAD
LOCATION SIMPLE: LEFT FOREHEAD
LOCATION SIMPLE: LEFT SCALP
LOCATION SIMPLE: LEFT FOOT
LOCATION SIMPLE: LEFT CHEEK
LOCATION SIMPLE: FRONTAL SCALP
LOCATION SIMPLE: RIGHT FOREHEAD
LOCATION SIMPLE: LEFT SCALP
LOCATION SIMPLE: LEFT CHEEK
LOCATION SIMPLE: SUPERIOR FOREHEAD
LOCATION SIMPLE: SCALP

## 2022-12-15 ASSESSMENT — LOCATION ZONE DERM
LOCATION ZONE: SCALP
LOCATION ZONE: SCALP
LOCATION ZONE: FACE
LOCATION ZONE: FACE
LOCATION ZONE: FEET

## 2022-12-15 NOTE — PROCEDURE: PRESCRIPTION MEDICATION MANAGEMENT
Discontinue Regimen: Fluticasone
Continue Regimen: Cicloprox
Detail Level: Zone
Render In Strict Bullet Format?: No

## 2023-01-25 ENCOUNTER — OFFICE VISIT (OUTPATIENT)
Dept: CARDIOLOGY CLINIC | Age: 83
End: 2023-01-25
Payer: MEDICARE

## 2023-01-25 VITALS
BODY MASS INDEX: 27.02 KG/M2 | WEIGHT: 168.1 LBS | HEART RATE: 64 BPM | HEIGHT: 66 IN | DIASTOLIC BLOOD PRESSURE: 80 MMHG | SYSTOLIC BLOOD PRESSURE: 160 MMHG

## 2023-01-25 DIAGNOSIS — E78.00 PURE HYPERCHOLESTEROLEMIA: ICD-10-CM

## 2023-01-25 DIAGNOSIS — I10 ESSENTIAL HYPERTENSION: Primary | ICD-10-CM

## 2023-01-25 DIAGNOSIS — I25.10 CORONARY ARTERY DISEASE INVOLVING NATIVE CORONARY ARTERY OF NATIVE HEART WITHOUT ANGINA PECTORIS: ICD-10-CM

## 2023-01-25 PROCEDURE — 1123F ACP DISCUSS/DSCN MKR DOCD: CPT | Performed by: INTERNAL MEDICINE

## 2023-01-25 PROCEDURE — 3079F DIAST BP 80-89 MM HG: CPT | Performed by: INTERNAL MEDICINE

## 2023-01-25 PROCEDURE — 99214 OFFICE O/P EST MOD 30 MIN: CPT | Performed by: INTERNAL MEDICINE

## 2023-01-25 PROCEDURE — 3077F SYST BP >= 140 MM HG: CPT | Performed by: INTERNAL MEDICINE

## 2023-01-25 RX ORDER — SIMVASTATIN 40 MG
40 TABLET ORAL NIGHTLY
Qty: 90 TABLET | Refills: 3 | Status: SHIPPED | OUTPATIENT
Start: 2023-01-25

## 2023-01-25 RX ORDER — CLOPIDOGREL BISULFATE 75 MG/1
75 TABLET ORAL DAILY
Qty: 90 TABLET | Refills: 3 | Status: SHIPPED | OUTPATIENT
Start: 2023-01-25

## 2023-01-25 RX ORDER — LISINOPRIL 20 MG/1
20 TABLET ORAL DAILY
Qty: 90 TABLET | Refills: 3 | Status: SHIPPED | OUTPATIENT
Start: 2023-01-25

## 2023-01-25 NOTE — PROGRESS NOTES
[facility] CARDIOLOGY  [address] [facility], [address] [facility], [address] [place] [address]  PHONE: [phone]      23    NAME:  Sergio Sahni  : 1940  MRN: [id number]       SUBJECTIVE:   Sergio Sahni is a 80 y.o. male seen for a follow up visit regarding the following:     Chief Complaint   Patient presents with    Coronary Artery Disease         HPI:    No cp or raymond. No orthopnea or pnd. No palpitations or syncope. Past Medical History, Past Surgical History, Family history, Social History, and Medications were all reviewed with the patient today and updated as necessary. Current Outpatient Medications   Medication Sig Dispense Refill    lisinopril (PRINIVIL;ZESTRIL) 20 MG tablet Take 1 tablet by mouth daily 90 tablet 3    clopidogrel (PLAVIX) 75 MG tablet Take 1 tablet by mouth daily 90 tablet 3    simvastatin (ZOCOR) 40 MG tablet Take 1 tablet by mouth nightly 90 tablet 3    Multiple Vitamin (DAILY VITAMIN PO) Take by mouth      aspirin 81 MG chewable tablet Take 81 mg by mouth       No current facility-administered medications for this visit. Social History     Tobacco Use    Smoking status: Never    Smokeless tobacco: Never   Substance Use Topics    Alcohol use: No              PHYSICAL EXAM:   BP (!) 160/80   Pulse 64   Ht 5' 6\" (1.676 m)   Wt 168 lb 1.6 oz (76.2 kg)   BMI 27.13 kg/m²    Constitutional: Oriented to person, place, and time. Appears well-developed and well-nourished. Head: Normocephalic and atraumatic. Neck: Neck supple. Cardiovascular: Normal rate and regular rhythm with no murmur -No JVP  Pulmonary/Chest: Breath sounds normal.   Abdominal: Soft. Musculoskeletal: No edema. Neurological: Alert and oriented to person, place, and time. Skin: Skin is warm and dry. Psychiatric: Normal mood and affect. Vitals reviewed.       Wt Readings from Last 3 Encounters:   23 168 lb 1.6 oz (76.2 kg)   08/10/22 166 lb (75.3 kg)   22 166 lb 4.8 oz (75.4 kg)       Medical problems and test results were reviewed with the patient today. ASSESSMENT and PLAN    1. Essential hypertension  Worse- increase lisinopril 20mg qd    2. Coronary artery disease involving native coronary artery of native heart without angina pectoris  Stable. Continue plavix      3. Pure hypercholesterolemia  Stable. Continue zocor- lipids at goal 7/22         Return in about 4 months (around 5/25/2023).          Gerardo Randall MD  1/25/2023  9:18 AM

## 2023-02-14 ENCOUNTER — APPOINTMENT (RX ONLY)
Dept: URBAN - METROPOLITAN AREA CLINIC 329 | Facility: CLINIC | Age: 83
Setting detail: DERMATOLOGY
End: 2023-02-14

## 2023-02-14 PROBLEM — C44.41 BASAL CELL CARCINOMA OF SKIN OF SCALP AND NECK: Status: ACTIVE | Noted: 2023-02-14

## 2023-02-14 PROCEDURE — ? EXCISION

## 2023-02-14 PROCEDURE — 17272 DSTR MAL LES S/N/H/F/G 1.1-2: CPT

## 2023-02-14 PROCEDURE — 12032 INTMD RPR S/A/T/EXT 2.6-7.5: CPT | Mod: 59

## 2023-02-14 PROCEDURE — ? CURETTAGE AND DESTRUCTION

## 2023-02-14 PROCEDURE — 11621 EXC S/N/H/F/G MAL+MRG 0.6-1: CPT | Mod: 59

## 2023-02-14 NOTE — PROCEDURE: EXCISION
Patient is 47 y.o. F presenting today with flulike symptoms. Onset: 1 day  Symptoms include: headache, myalgias, cough, fevers, chills, n/v  No complaints of: sob, chest pain, abd pain, diarrhea, change in urine output/color  Medications tried: none  Positive ill contacts: grandchildren + for flu               Past Medical History:   Diagnosis Date    Arthritis     GERD (gastroesophageal reflux disease)     controlled by diet    Hypertension 2018       Past Surgical History:   Procedure Laterality Date    HX HYSTERECTOMY  2013    HX KNEE ARTHROSCOPY Right          No family history on file. Social History     Socioeconomic History    Marital status: SINGLE     Spouse name: Not on file    Number of children: Not on file    Years of education: Not on file    Highest education level: Not on file   Occupational History    Not on file   Tobacco Use    Smoking status: Never    Smokeless tobacco: Never   Substance and Sexual Activity    Alcohol use: Not Currently    Drug use: No    Sexual activity: Not Currently   Other Topics Concern    Not on file   Social History Narrative    Not on file     Social Determinants of Health     Financial Resource Strain: Not on file   Food Insecurity: Not on file   Transportation Needs: Not on file   Physical Activity: Not on file   Stress: Not on file   Social Connections: Not on file   Intimate Partner Violence: Not on file   Housing Stability: Not on file         ALLERGIES: Ibuprofen and Morphine    Review of Systems   Constitutional:  Positive for chills, fatigue and fever. HENT:  Positive for congestion and rhinorrhea. Eyes:  Negative for redness and visual disturbance. Respiratory:  Positive for cough. Negative for shortness of breath. Cardiovascular:  Negative for chest pain and leg swelling. Gastrointestinal:  Positive for nausea and vomiting. Negative for abdominal pain and diarrhea.    Genitourinary:  Negative for dysuria, flank pain, frequency, hematuria and urgency. Musculoskeletal:  Negative for arthralgias, back pain, myalgias and neck pain. Skin:  Negative for rash and wound. Allergic/Immunologic: Negative for immunocompromised state. Neurological:  Positive for headaches. Negative for dizziness. Vitals:    11/23/22 1729 11/23/22 1732 11/23/22 1735   BP:  (!) 143/82    Pulse:  91    Resp:   16   Temp: 99.4 °F (37.4 °C)     SpO2:  98%    Weight:  128.4 kg (283 lb)    Height:  5' 3\" (1.6 m)             Physical Exam  Vitals and nursing note reviewed. Constitutional:       General: She is not in acute distress. Appearance: She is well-developed. She is not diaphoretic. HENT:      Head: Normocephalic. Right Ear: Tympanic membrane normal.      Left Ear: Tympanic membrane normal.      Nose: Congestion present. Mouth/Throat:      Pharynx: Posterior oropharyngeal erythema present. No oropharyngeal exudate. Eyes:      General:         Right eye: No discharge. Left eye: No discharge. Pupils: Pupils are equal, round, and reactive to light. Cardiovascular:      Rate and Rhythm: Normal rate and regular rhythm. Heart sounds: Normal heart sounds. No murmur heard. No friction rub. No gallop. Pulmonary:      Effort: Pulmonary effort is normal. No respiratory distress. Breath sounds: Normal breath sounds. No stridor. No wheezing or rales. Abdominal:      General: Bowel sounds are normal. There is no distension. Palpations: Abdomen is soft. Tenderness: There is no abdominal tenderness. There is no guarding or rebound. Musculoskeletal:         General: No deformity. Normal range of motion. Cervical back: Normal range of motion and neck supple. Skin:     General: Skin is warm and dry. Capillary Refill: Capillary refill takes less than 2 seconds. Findings: No rash. Neurological:      Mental Status: She is alert and oriented to person, place, and time.    Psychiatric:         Behavior: Behavior normal.        MDM         Procedures      Tylenol, Toradol, Zofran given  Tolerating PO    47 y.o. F presenting today with flulike symptoms. On exam patient is well-appearing and in no acute distress with stable vital signs. Lungs are clear to auscultation. No history or findings to suggest lower respiratory tract infection. Per patient request she was given Tamiflu after discussion about risks and benefits. She does have htn/obesity therefore higher risk. Testing was not performed--presumptive positive. Advised to stay home until symptoms improving in 24 hours of no fever without medications. Encouraged Tylenol, ibuprofen use with oral hydration. ICD-10-CM ICD-9-CM    1. Flu-like symptoms  R68.89 780.99       2. Acute nonintractable headache, unspecified headache type  R51.9 784.0       3. Nausea and vomiting, unspecified vomiting type  R11.2 787.01       4.  Exposure to the flu  Z20.828 V01.79           Disposition: Discharge    Sin Martin DO Melolabial Transposition Flap Text: The defect edges were debeveled with a #15 scalpel blade.  Given the location of the defect and the proximity to free margins a melolabial flap was deemed most appropriate.  Using a sterile surgical marker, an appropriate melolabial transposition flap was drawn incorporating the defect.    The area thus outlined was incised deep to adipose tissue with a #15 scalpel blade.  The skin margins were undermined to an appropriate distance in all directions utilizing iris scissors.

## 2023-02-23 ENCOUNTER — APPOINTMENT (RX ONLY)
Dept: URBAN - METROPOLITAN AREA CLINIC 329 | Facility: CLINIC | Age: 83
Setting detail: DERMATOLOGY
End: 2023-02-23

## 2023-02-23 PROBLEM — C44.41 BASAL CELL CARCINOMA OF SKIN OF SCALP AND NECK: Status: ACTIVE | Noted: 2023-02-23

## 2023-02-23 PROCEDURE — ? SUTURE REMOVAL (GLOBAL PERIOD)

## 2023-02-23 PROCEDURE — 99024 POSTOP FOLLOW-UP VISIT: CPT

## 2023-02-23 NOTE — PROCEDURE: SUTURE REMOVAL (GLOBAL PERIOD)
Detail Level: Detailed
Add 66451 Cpt? (Important Note: In 2017 The Use Of 66048 Is Being Tracked By Cms To Determine Future Global Period Reimbursement For Global Periods): yes

## 2023-03-06 ENCOUNTER — OFFICE VISIT (OUTPATIENT)
Dept: VASCULAR SURGERY | Age: 83
End: 2023-03-06
Payer: MEDICARE

## 2023-03-06 VITALS
BODY MASS INDEX: 26.68 KG/M2 | WEIGHT: 166 LBS | HEART RATE: 78 BPM | OXYGEN SATURATION: 93 % | SYSTOLIC BLOOD PRESSURE: 155 MMHG | HEIGHT: 66 IN | DIASTOLIC BLOOD PRESSURE: 69 MMHG

## 2023-03-06 DIAGNOSIS — I65.23 BILATERAL CAROTID ARTERY STENOSIS: Primary | ICD-10-CM

## 2023-03-06 DIAGNOSIS — Z98.890 H/O CAROTID ENDARTERECTOMY: ICD-10-CM

## 2023-03-06 PROCEDURE — 1123F ACP DISCUSS/DSCN MKR DOCD: CPT | Performed by: NURSE PRACTITIONER

## 2023-03-06 PROCEDURE — 3077F SYST BP >= 140 MM HG: CPT | Performed by: NURSE PRACTITIONER

## 2023-03-06 PROCEDURE — 3078F DIAST BP <80 MM HG: CPT | Performed by: NURSE PRACTITIONER

## 2023-03-06 PROCEDURE — 99213 OFFICE O/P EST LOW 20 MIN: CPT | Performed by: NURSE PRACTITIONER

## 2023-03-06 NOTE — PROGRESS NOTES
DATE OF VISIT: 3/6/2023      [facility]  Iesha Monson is a 80 y.o. male who follows up for his 6 month carotid duplex study. He denies any new lateralizing neurological symptoms. He has undergone bilateral carotid endarterectomies. He remains on ASA, Plavix and Zocor. 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    Hypertrophy of prostate without urinary obstruction and other lower urinary tract symptoms (LUTS) 11/25/2013    Impotence of organic origin 11/25/2013    Long term (current) use of aspirin     Other testicular hypofunction 11/25/2013    Pure hypercholesterolemia 11/25/2013    Unspecified essential hypertension 11/25/2013    Unstable angina ([facility] [address] 06/14/2017    pt denies any CP or SOB         SURGICAL HISTORY:   Past Surgical History:   Procedure Laterality Date    CAROTID ENDARTERECTOMY Right 2012    COLONOSCOPY      HERNIA REPAIR Bilateral     OTHER SURGICAL HISTORY  as a child-age 1    chest surgery \"water on my chest\"     OTHER SURGICAL HISTORY      hydrocelectomy       Review of Systems:  Constitutional:   Negative for fevers and unexplained weight loss. Respiratory:   Negative for hemoptysis. Cardiovascular:   Negative except as noted in HPI. Musculoskeletal:  Negative for active, unexplained/severe joint pain.       ALLERGIES: No Known Allergies    CURRENT MEDICATIONS:  Current Outpatient Medications   Medication Sig Dispense Refill    lisinopril (PRINIVIL;ZESTRIL) 20 MG tablet Take 1 tablet by mouth daily 90 tablet 3    clopidogrel (PLAVIX) 75 MG tablet Take 1 tablet by mouth daily 90 tablet 3    simvastatin (ZOCOR) 40 MG tablet Take 1 tablet by mouth nightly 90 tablet 3    Multiple Vitamin (DAILY VITAMIN PO) Take by mouth      aspirin 81 MG chewable tablet Take 81 mg by mouth       No current facility-administered medications for this visit. IMAGING: Interpretation Summary         Patch observed in the right CCA/ICA system. Patient is post CEA. Mild (<50%) stenosis in the right internal carotid artery. Patch observed in the left CCA/ICA system. Patient is post CEA. Mild (<50%) stenosis in the left internal carotid artery. Normal antegrade flow involving the right vertebral artery. Normal antegrade flow involving the left vertebral artery. Procedure Details    A gray scale, color Doppler imaging and spectral Doppler analysis ultrasound was performed. During the study longitudinal and transverse views were obtained. Pulsed wave doppler was performed. Overall the study quality was good. Cerebrovascular Findings    Right Carotid    Patient is s/p carotid endarterectomy. Right arm BP: 152 mmHg. Internal Carotid Artery: Mild (<50%) stenosis in the proximal ICA. Homogeneous plaque. Vertebral Artery: Flow is antegrade. Left Carotid    Patient is s/p carotid endarterectomy. Left arm BP: 155 mmHg. Internal Carotid Artery: Mild (<50%) stenosis in the proximal ICA. Homogeneous plaque. Vertebral Artery: Flow is antegrade.      Carotid Measurements     Right PSV Right EDV Left PSV Left EDV   CCA Prox 133 cm/s        21.2 cm/s        118 cm/s        19 cm/s          CCA Dist 149 cm/s        22.2 cm/s        110 cm/s        19.9 cm/s          ICA Prox 242 cm/s        4.2 cm/s        199 cm/s        32.4 cm/s          ICA Mid 144 cm/s        25.7 cm/s        184 cm/s        25.2 cm/s          ICA Dist 116 cm/s        24.6 cm/s        109 cm/s        20.4 cm/s          ICA/CCA Ratio 1.62         1.81            cm/s        0 cm/s        227 cm/s        7.58 cm/s          Bulb 192 cm/s        24 cm/s        152 cm/s        28.4 cm/s          Vertebral 73.9 cm/s        2.84 cm/s        86.3 cm/s        11.6 cm/s Arterial Pressure Measurements     Right Left   Brachial  mmHg        155 mmHg                                    Procedure Staff    Technologist/Clinician: Rosey Charles  Supporting Staff: None  Performing Physician/Midlevel: None     Exam Completion Date/Time: 3/6/23  2:55 PM      PHYSICAL EXAM  VITALS: BP (!) 155/69 (Site: Left Upper Arm, Position: Sitting, Cuff Size: Medium Adult)   Pulse 78   Ht 5' 6\" (1.676 m)   Wt 166 lb (75.3 kg)   SpO2 93%   BMI 26.79 kg/m²       GENERAL: Well developed, well nourished, in NAD  HEAD/NECK: normocephalic, atraumatic, neck supple  MUSCULOSKELETAL: normal gait  NEURO: sensation and strength grossly intact and symmetrical  PSYCH: alert and oriented to person, place and time      Assessment/Plan:Patient is an 80year old male who follows up for his 6 month carotid duplex study. He denies any new, lateralizing neurological symptoms. He has undergone bilateral carotid endarterectomies. He remains on ASA, Plavix and Zocor. Patient has mild re-stenosis in the left carotid endarterectomy. Will continue to follow with yearly carotid duplex studies. He is to present to the ER with any S/S of a stroke ie: slurred speech, facial drooping, amaurosis fugax or unilateral weakness.         MARK Mora - CNP    20 minutes of time was spent on this encounter including chart review, assessment and evaluation

## 2023-06-01 ENCOUNTER — OFFICE VISIT (OUTPATIENT)
Age: 83
End: 2023-06-01
Payer: MEDICARE

## 2023-06-01 VITALS
DIASTOLIC BLOOD PRESSURE: 76 MMHG | HEART RATE: 60 BPM | WEIGHT: 168 LBS | SYSTOLIC BLOOD PRESSURE: 146 MMHG | HEIGHT: 66 IN | BODY MASS INDEX: 27 KG/M2

## 2023-06-01 DIAGNOSIS — I25.10 CORONARY ARTERY DISEASE INVOLVING NATIVE CORONARY ARTERY OF NATIVE HEART WITHOUT ANGINA PECTORIS: Primary | ICD-10-CM

## 2023-06-01 DIAGNOSIS — E78.00 PURE HYPERCHOLESTEROLEMIA: ICD-10-CM

## 2023-06-01 DIAGNOSIS — I10 ESSENTIAL HYPERTENSION: ICD-10-CM

## 2023-06-01 PROCEDURE — 1123F ACP DISCUSS/DSCN MKR DOCD: CPT | Performed by: INTERNAL MEDICINE

## 2023-06-01 PROCEDURE — 99214 OFFICE O/P EST MOD 30 MIN: CPT | Performed by: INTERNAL MEDICINE

## 2023-06-01 PROCEDURE — 3077F SYST BP >= 140 MM HG: CPT | Performed by: INTERNAL MEDICINE

## 2023-06-01 PROCEDURE — 3078F DIAST BP <80 MM HG: CPT | Performed by: INTERNAL MEDICINE

## 2023-06-01 RX ORDER — LISINOPRIL 30 MG/1
30 TABLET ORAL DAILY
COMMUNITY
Start: 2023-04-18

## 2023-06-01 NOTE — PROGRESS NOTES
[facility] CARDIOLOGY  [address] [facility], [address] [facility], [address] [place] [address]  PHONE: [phone]      23    NAME:  Miryam Johnson  : 1940  MRN: [id number]       SUBJECTIVE:   Miryam Johnson is a 80 y.o. male seen for a follow up visit regarding the following:     Chief Complaint   Patient presents with    Hypertension    Coronary Artery Disease         HPI:    No cp or raymond. No orthopnea or pnd. No palpitations or syncope. Past Medical History, Past Surgical History, Family history, Social History, and Medications were all reviewed with the patient today and updated as necessary. Current Outpatient Medications   Medication Sig Dispense Refill    lisinopril (PRINIVIL;ZESTRIL) 30 MG tablet Take 1 tablet by mouth daily      clopidogrel (PLAVIX) 75 MG tablet Take 1 tablet by mouth daily 90 tablet 3    simvastatin (ZOCOR) 40 MG tablet Take 1 tablet by mouth nightly 90 tablet 3    Multiple Vitamin (DAILY VITAMIN PO) Take by mouth      aspirin 81 MG chewable tablet Take 1 tablet by mouth       No current facility-administered medications for this visit. Social History     Tobacco Use    Smoking status: Never    Smokeless tobacco: Never   Substance Use Topics    Alcohol use: No              PHYSICAL EXAM:   BP (!) 146/76   Pulse 60   Ht 5' 6\" (1.676 m)   Wt 168 lb (76.2 kg)   BMI 27.12 kg/m²    Constitutional: Oriented to person, place, and time. Appears well-developed and well-nourished. Head: Normocephalic and atraumatic. Neck: Neck supple. Cardiovascular: Normal rate and regular rhythm with no murmur -No JVP  Pulmonary/Chest: Breath sounds normal.   Abdominal: Soft. Musculoskeletal: No edema. Neurological: Alert and oriented to person, place, and time. Skin: Skin is warm and dry. Psychiatric: Normal mood and affect. Vitals reviewed.       Wt Readings from Last 3 Encounters:   23 168 lb (76.2 kg)   23 166 lb (75.3 kg)   23 168 lb 1.6 oz

## 2023-06-21 ENCOUNTER — APPOINTMENT (RX ONLY)
Dept: URBAN - METROPOLITAN AREA CLINIC 329 | Facility: CLINIC | Age: 83
Setting detail: DERMATOLOGY
End: 2023-06-21

## 2023-06-21 DIAGNOSIS — L57.0 ACTINIC KERATOSIS: ICD-10-CM

## 2023-06-21 DIAGNOSIS — L82.1 OTHER SEBORRHEIC KERATOSIS: ICD-10-CM

## 2023-06-21 DIAGNOSIS — L90.5 SCAR CONDITIONS AND FIBROSIS OF SKIN: ICD-10-CM

## 2023-06-21 DIAGNOSIS — L57.8 OTHER SKIN CHANGES DUE TO CHRONIC EXPOSURE TO NONIONIZING RADIATION: ICD-10-CM

## 2023-06-21 DIAGNOSIS — Z85.828 PERSONAL HISTORY OF OTHER MALIGNANT NEOPLASM OF SKIN: ICD-10-CM

## 2023-06-21 DIAGNOSIS — D22 MELANOCYTIC NEVI: ICD-10-CM

## 2023-06-21 PROBLEM — D22.5 MELANOCYTIC NEVI OF TRUNK: Status: ACTIVE | Noted: 2023-06-21

## 2023-06-21 PROCEDURE — 17000 DESTRUCT PREMALG LESION: CPT

## 2023-06-21 PROCEDURE — 17003 DESTRUCT PREMALG LES 2-14: CPT

## 2023-06-21 PROCEDURE — ? OTHER

## 2023-06-21 PROCEDURE — ? COUNSELING

## 2023-06-21 PROCEDURE — ? LIQUID NITROGEN

## 2023-06-21 PROCEDURE — 99213 OFFICE O/P EST LOW 20 MIN: CPT | Mod: 25

## 2023-06-21 PROCEDURE — ? TREATMENT REGIMEN

## 2023-06-21 ASSESSMENT — LOCATION DETAILED DESCRIPTION DERM
LOCATION DETAILED: LEFT SUPERIOR CENTRAL MALAR CHEEK
LOCATION DETAILED: LEFT INFERIOR TEMPLE
LOCATION DETAILED: RIGHT SUPERIOR MEDIAL UPPER BACK
LOCATION DETAILED: RIGHT MEDIAL SUPERIOR CHEST
LOCATION DETAILED: RIGHT INFERIOR MEDIAL UPPER BACK
LOCATION DETAILED: LEFT INFERIOR FOREHEAD
LOCATION DETAILED: LEFT CENTRAL FRONTAL SCALP
LOCATION DETAILED: RIGHT FOREHEAD
LOCATION DETAILED: LEFT DISTAL DORSAL FOREARM
LOCATION DETAILED: RIGHT DISTAL RADIAL DORSAL FOREARM
LOCATION DETAILED: LEFT CENTRAL POSTAURICULAR SKIN
LOCATION DETAILED: LEFT SUPERIOR FOREHEAD
LOCATION DETAILED: RIGHT SUPERIOR FOREHEAD
LOCATION DETAILED: LEFT SUPERIOR LATERAL MALAR CHEEK
LOCATION DETAILED: SUPERIOR THORACIC SPINE
LOCATION DETAILED: STERNUM

## 2023-06-21 ASSESSMENT — LOCATION SIMPLE DESCRIPTION DERM
LOCATION SIMPLE: RIGHT FOREARM
LOCATION SIMPLE: SCALP
LOCATION SIMPLE: CHEST
LOCATION SIMPLE: LEFT CHEEK
LOCATION SIMPLE: LEFT FOREHEAD
LOCATION SIMPLE: UPPER BACK
LOCATION SIMPLE: LEFT FOREARM
LOCATION SIMPLE: RIGHT UPPER BACK
LOCATION SIMPLE: LEFT TEMPLE
LOCATION SIMPLE: RIGHT FOREHEAD

## 2023-06-21 ASSESSMENT — LOCATION ZONE DERM
LOCATION ZONE: ARM
LOCATION ZONE: SCALP
LOCATION ZONE: TRUNK
LOCATION ZONE: FACE

## 2023-06-21 NOTE — PROCEDURE: OTHER
Render Risk Assessment In Note?: no
Note Text (......Xxx Chief Complaint.): This diagnosis correlates with the
Other (Free Text): Offered pt ILK injection, pt declined at this time \\nCan apply otc hydrocortisone cream as needed
Detail Level: Detailed

## 2023-12-05 ENCOUNTER — OFFICE VISIT (OUTPATIENT)
Age: 83
End: 2023-12-05
Payer: MEDICARE

## 2023-12-05 VITALS
SYSTOLIC BLOOD PRESSURE: 128 MMHG | HEIGHT: 66 IN | WEIGHT: 167.7 LBS | DIASTOLIC BLOOD PRESSURE: 60 MMHG | BODY MASS INDEX: 26.95 KG/M2 | HEART RATE: 59 BPM

## 2023-12-05 DIAGNOSIS — E78.00 PURE HYPERCHOLESTEROLEMIA: ICD-10-CM

## 2023-12-05 DIAGNOSIS — I10 ESSENTIAL HYPERTENSION: ICD-10-CM

## 2023-12-05 DIAGNOSIS — I25.10 CORONARY ARTERY DISEASE INVOLVING NATIVE CORONARY ARTERY OF NATIVE HEART WITHOUT ANGINA PECTORIS: Primary | ICD-10-CM

## 2023-12-05 PROCEDURE — 93000 ELECTROCARDIOGRAM COMPLETE: CPT | Performed by: INTERNAL MEDICINE

## 2023-12-05 PROCEDURE — 99214 OFFICE O/P EST MOD 30 MIN: CPT | Performed by: INTERNAL MEDICINE

## 2023-12-05 PROCEDURE — 3074F SYST BP LT 130 MM HG: CPT | Performed by: INTERNAL MEDICINE

## 2023-12-05 PROCEDURE — 1123F ACP DISCUSS/DSCN MKR DOCD: CPT | Performed by: INTERNAL MEDICINE

## 2023-12-05 PROCEDURE — 3078F DIAST BP <80 MM HG: CPT | Performed by: INTERNAL MEDICINE

## 2024-03-06 ENCOUNTER — OFFICE VISIT (OUTPATIENT)
Dept: VASCULAR SURGERY | Age: 84
End: 2024-03-06
Payer: MEDICARE

## 2024-03-06 VITALS
WEIGHT: 168 LBS | DIASTOLIC BLOOD PRESSURE: 66 MMHG | OXYGEN SATURATION: 95 % | HEART RATE: 60 BPM | HEIGHT: 66 IN | BODY MASS INDEX: 27 KG/M2 | SYSTOLIC BLOOD PRESSURE: 176 MMHG

## 2024-03-06 DIAGNOSIS — Z98.890 H/O CAROTID ENDARTERECTOMY: ICD-10-CM

## 2024-03-06 DIAGNOSIS — I65.23 BILATERAL CAROTID ARTERY STENOSIS: Primary | ICD-10-CM

## 2024-03-06 PROCEDURE — 3078F DIAST BP <80 MM HG: CPT | Performed by: NURSE PRACTITIONER

## 2024-03-06 PROCEDURE — 3077F SYST BP >= 140 MM HG: CPT | Performed by: NURSE PRACTITIONER

## 2024-03-06 PROCEDURE — 1123F ACP DISCUSS/DSCN MKR DOCD: CPT | Performed by: NURSE PRACTITIONER

## 2024-03-06 PROCEDURE — 99213 OFFICE O/P EST LOW 20 MIN: CPT | Performed by: NURSE PRACTITIONER

## 2024-03-06 NOTE — PROGRESS NOTES
DATE OF VISIT: 3/6/2024      [facility]  Mahesh Mir is a 83 y.o. male who follows up for his carotid duplex study. He denies any new lateralizing neurological symptoms. He has undergone bilateral carotid endarterectomies. 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  Respiratory:   Negative for hemoptysis.  Cardiovascular:   Negative except as noted in HPI.  Musculoskeletal:  Negative for active, unexplained/severe joint pain.      ALLERGIES: No Known Allergies    CURRENT MEDICATIONS:  Current Outpatient Medications   Medication Sig Dispense Refill    Sully, Zingiber officinalis, (SULLY ROOT PO) Take by mouth daily      lisinopril (PRINIVIL;ZESTRIL) 30 MG tablet Take 1 tablet by mouth daily      simvastatin (ZOCOR) 40 MG tablet Take 1 tablet by mouth nightly 90 tablet 3    Multiple Vitamin (DAILY VITAMIN PO) Take by mouth      aspirin 81 MG chewable tablet Take 1

## 2024-04-09 RX ORDER — SIMVASTATIN 40 MG
40 TABLET ORAL NIGHTLY
Qty: 90 TABLET | Refills: 3 | Status: SHIPPED | OUTPATIENT
Start: 2024-04-09

## 2024-04-09 NOTE — TELEPHONE ENCOUNTER
Requested Prescriptions     Pending Prescriptions Disp Refills    simvastatin (ZOCOR) 40 MG tablet 90 tablet 3     Sig: Take 1 tablet by mouth nightly

## 2024-06-19 ENCOUNTER — OFFICE VISIT (OUTPATIENT)
Age: 84
End: 2024-06-19
Payer: MEDICARE

## 2024-06-19 VITALS
HEIGHT: 66 IN | BODY MASS INDEX: 26.84 KG/M2 | DIASTOLIC BLOOD PRESSURE: 70 MMHG | SYSTOLIC BLOOD PRESSURE: 128 MMHG | WEIGHT: 167 LBS | HEART RATE: 60 BPM

## 2024-06-19 DIAGNOSIS — I10 ESSENTIAL HYPERTENSION: ICD-10-CM

## 2024-06-19 DIAGNOSIS — E78.00 PURE HYPERCHOLESTEROLEMIA: Primary | ICD-10-CM

## 2024-06-19 DIAGNOSIS — I25.10 CORONARY ARTERY DISEASE INVOLVING NATIVE CORONARY ARTERY OF NATIVE HEART WITHOUT ANGINA PECTORIS: ICD-10-CM

## 2024-06-19 PROCEDURE — 1123F ACP DISCUSS/DSCN MKR DOCD: CPT | Performed by: INTERNAL MEDICINE

## 2024-06-19 PROCEDURE — 3078F DIAST BP <80 MM HG: CPT | Performed by: INTERNAL MEDICINE

## 2024-06-19 PROCEDURE — 3074F SYST BP LT 130 MM HG: CPT | Performed by: INTERNAL MEDICINE

## 2024-06-19 PROCEDURE — 99214 OFFICE O/P EST MOD 30 MIN: CPT | Performed by: INTERNAL MEDICINE

## 2024-06-19 NOTE — PROGRESS NOTES
[facility] CARDIOLOGY  [address] [place] [address], [address]  [place] [address]  PHONE: [phone]      24    NAME:  Mahesh Mir  : 1940  MRN: [id number]       SUBJECTIVE:   Mahesh Mir is a 83 y.o. male seen for a follow up visit regarding the following:     Chief Complaint   Patient presents with    Coronary Artery Disease         HPI:    No cp or raymond. No orthopnea or pnd. No palpitations or syncope.      Past Medical History, Past Surgical History, Family history, Social History, and Medications were all reviewed with the patient today and updated as necessary.     Current Outpatient Medications   Medication Sig Dispense Refill    simvastatin (ZOCOR) 40 MG tablet Take 1 tablet by mouth nightly 90 tablet 3    lisinopril (PRINIVIL;ZESTRIL) 30 MG tablet Take 1 tablet by mouth daily      Multiple Vitamin (DAILY VITAMIN PO) Take by mouth      aspirin 81 MG chewable tablet Take 1 tablet by mouth       No current facility-administered medications for this visit.               Social History     Tobacco Use    Smoking status: Never    Smokeless tobacco: Never   Substance Use Topics    Alcohol use: No              PHYSICAL EXAM:   /70   Pulse 60   Ht 1.676 m (5' 6\")   Wt 75.8 kg (167 lb)   BMI 26.95 kg/m²    Constitutional: Oriented to person, place, and time. Appears well-developed and well-nourished.   Head: Normocephalic and atraumatic.   Neck: Neck supple.   Cardiovascular: Normal rate and regular rhythm with no murmur -No JVP  Pulmonary/Chest: Breath sounds normal.   Abdominal: Soft.   Musculoskeletal: No edema.   Neurological: Alert and oriented to person, place, and time.   Skin: Skin is warm and dry.   Psychiatric: Normal mood and affect.   Vitals reviewed.      Wt Readings from Last 3 Encounters:   24 75.8 kg (167 lb)   24 76.2 kg (168 lb)   23 76.1 kg (167 lb 11.2 oz)       Medical problems and test results were reviewed with the patient today.

## 2024-06-20 ENCOUNTER — APPOINTMENT (RX ONLY)
Dept: URBAN - METROPOLITAN AREA CLINIC 329 | Facility: CLINIC | Age: 84
Setting detail: DERMATOLOGY
End: 2024-06-20

## 2024-06-20 DIAGNOSIS — L82.1 OTHER SEBORRHEIC KERATOSIS: ICD-10-CM

## 2024-06-20 DIAGNOSIS — L57.0 ACTINIC KERATOSIS: ICD-10-CM | Status: INADEQUATELY CONTROLLED

## 2024-06-20 DIAGNOSIS — L81.4 OTHER MELANIN HYPERPIGMENTATION: ICD-10-CM

## 2024-06-20 DIAGNOSIS — D18.0 HEMANGIOMA: ICD-10-CM

## 2024-06-20 DIAGNOSIS — D485 NEOPLASM OF UNCERTAIN BEHAVIOR OF SKIN: ICD-10-CM

## 2024-06-20 DIAGNOSIS — Z85.828 PERSONAL HISTORY OF OTHER MALIGNANT NEOPLASM OF SKIN: ICD-10-CM

## 2024-06-20 DIAGNOSIS — L57.8 OTHER SKIN CHANGES DUE TO CHRONIC EXPOSURE TO NONIONIZING RADIATION: ICD-10-CM | Status: STABLE

## 2024-06-20 DIAGNOSIS — Z12.83 ENCOUNTER FOR SCREENING FOR MALIGNANT NEOPLASM OF SKIN: ICD-10-CM

## 2024-06-20 DIAGNOSIS — Z71.89 OTHER SPECIFIED COUNSELING: ICD-10-CM

## 2024-06-20 DIAGNOSIS — D22 MELANOCYTIC NEVI: ICD-10-CM

## 2024-06-20 PROBLEM — D18.01 HEMANGIOMA OF SKIN AND SUBCUTANEOUS TISSUE: Status: ACTIVE | Noted: 2024-06-20

## 2024-06-20 PROBLEM — D22.71 MELANOCYTIC NEVI OF RIGHT LOWER LIMB, INCLUDING HIP: Status: ACTIVE | Noted: 2024-06-20

## 2024-06-20 PROBLEM — D22.5 MELANOCYTIC NEVI OF TRUNK: Status: ACTIVE | Noted: 2024-06-20

## 2024-06-20 PROBLEM — D22.61 MELANOCYTIC NEVI OF RIGHT UPPER LIMB, INCLUDING SHOULDER: Status: ACTIVE | Noted: 2024-06-20

## 2024-06-20 PROBLEM — D48.5 NEOPLASM OF UNCERTAIN BEHAVIOR OF SKIN: Status: ACTIVE | Noted: 2024-06-20

## 2024-06-20 PROCEDURE — 11103 TANGNTL BX SKIN EA SEP/ADDL: CPT

## 2024-06-20 PROCEDURE — 17003 DESTRUCT PREMALG LES 2-14: CPT

## 2024-06-20 PROCEDURE — 11102 TANGNTL BX SKIN SINGLE LES: CPT

## 2024-06-20 PROCEDURE — ? ADDITIONAL NOTES

## 2024-06-20 PROCEDURE — 99213 OFFICE O/P EST LOW 20 MIN: CPT | Mod: 25

## 2024-06-20 PROCEDURE — ? COUNSELING

## 2024-06-20 PROCEDURE — ? TREATMENT REGIMEN

## 2024-06-20 PROCEDURE — ? BIOPSY BY SHAVE METHOD

## 2024-06-20 PROCEDURE — ? FULL BODY SKIN EXAM

## 2024-06-20 PROCEDURE — 17000 DESTRUCT PREMALG LESION: CPT | Mod: 59

## 2024-06-20 PROCEDURE — ? LIQUID NITROGEN

## 2024-06-20 PROCEDURE — ? SUNSCREEN RECOMMENDATIONS

## 2024-06-20 PROCEDURE — ? DERMATOSCOPIC EVALUATION

## 2024-06-20 ASSESSMENT — LOCATION DETAILED DESCRIPTION DERM
LOCATION DETAILED: RIGHT PROXIMAL DORSAL FOREARM
LOCATION DETAILED: LEFT INFERIOR ANTERIOR NECK
LOCATION DETAILED: MIDDLE STERNUM
LOCATION DETAILED: LEFT DISTAL DORSAL FOREARM
LOCATION DETAILED: RIGHT ANTERIOR PROXIMAL UPPER ARM
LOCATION DETAILED: RIGHT SCAPHA
LOCATION DETAILED: LEFT CENTRAL LATERAL NECK
LOCATION DETAILED: RIGHT INFERIOR LATERAL MALAR CHEEK
LOCATION DETAILED: RIGHT SUPERIOR MEDIAL UPPER BACK
LOCATION DETAILED: LEFT SUPERIOR LATERAL MALAR CHEEK
LOCATION DETAILED: RIGHT DISTAL PRETIBIAL REGION
LOCATION DETAILED: LEFT MEDIAL MALAR CHEEK
LOCATION DETAILED: RIGHT PROXIMAL POSTERIOR UPPER ARM
LOCATION DETAILED: RIGHT POSTERIOR ANKLE
LOCATION DETAILED: LEFT SUPERIOR UPPER BACK
LOCATION DETAILED: LEFT SUPERIOR LATERAL BUCCAL CHEEK
LOCATION DETAILED: LEFT SUPERIOR CENTRAL MALAR CHEEK
LOCATION DETAILED: RIGHT ANTERIOR DISTAL THIGH
LOCATION DETAILED: RIGHT INFERIOR MEDIAL UPPER BACK
LOCATION DETAILED: LEFT DISTAL PRETIBIAL REGION
LOCATION DETAILED: RIGHT SUPERIOR LATERAL BUCCAL CHEEK
LOCATION DETAILED: RIGHT RADIAL DORSAL HAND
LOCATION DETAILED: LEFT SCAPHA
LOCATION DETAILED: LEFT MEDIAL UPPER BACK
LOCATION DETAILED: RIGHT DISTAL POSTERIOR THIGH
LOCATION DETAILED: EPIGASTRIC SKIN
LOCATION DETAILED: RIGHT DISTAL RADIAL DORSAL FOREARM

## 2024-06-20 ASSESSMENT — LOCATION SIMPLE DESCRIPTION DERM
LOCATION SIMPLE: RIGHT CHEEK
LOCATION SIMPLE: LEFT FOREARM
LOCATION SIMPLE: RIGHT HAND
LOCATION SIMPLE: ABDOMEN
LOCATION SIMPLE: LEFT PRETIBIAL REGION
LOCATION SIMPLE: RIGHT ANKLE
LOCATION SIMPLE: LEFT CHEEK
LOCATION SIMPLE: RIGHT POSTERIOR UPPER ARM
LOCATION SIMPLE: RIGHT UPPER BACK
LOCATION SIMPLE: LEFT UPPER BACK
LOCATION SIMPLE: RIGHT PRETIBIAL REGION
LOCATION SIMPLE: RIGHT POSTERIOR THIGH
LOCATION SIMPLE: CHEST
LOCATION SIMPLE: RIGHT UPPER ARM
LOCATION SIMPLE: RIGHT THIGH
LOCATION SIMPLE: LEFT EAR
LOCATION SIMPLE: RIGHT EAR
LOCATION SIMPLE: RIGHT FOREARM
LOCATION SIMPLE: NECK
LOCATION SIMPLE: LEFT ANTERIOR NECK

## 2024-06-20 ASSESSMENT — LOCATION ZONE DERM
LOCATION ZONE: EAR
LOCATION ZONE: HAND
LOCATION ZONE: TRUNK
LOCATION ZONE: FACE
LOCATION ZONE: LEG
LOCATION ZONE: NECK
LOCATION ZONE: ARM

## 2024-06-20 NOTE — HPI: SKIN LESION
What Type Of Note Output Would You Prefer (Optional)?: Bullet Format
How Severe Is Your Skin Lesion?: mild
Is This A New Presentation, Or A Follow-Up?: Skin Lesions
Additional History: Patient states that he has lesions of concern, located on his neck, face and arm. He states that some of the lesions were burning and raised. He has had them for several months

## 2024-06-20 NOTE — PROCEDURE: BIOPSY BY SHAVE METHOD
Detail Level: Detailed
Depth Of Biopsy: dermis
Was A Bandage Applied: Yes
Size Of Lesion In Cm: 1
X Size Of Lesion In Cm: 0
Biopsy Type: H and E
Biopsy Method: Dermablade
Anesthesia Type: 1% lidocaine with epinephrine
Anesthesia Volume In Cc: 0.5
Hemostasis: Drysol
Wound Care: Petrolatum
Dressing: bandage
Destruction After The Procedure: No
Type Of Destruction Used: Curettage
Curettage Text: The wound bed was treated with curettage after the biopsy was performed.
Cryotherapy Text: The wound bed was treated with cryotherapy after the biopsy was performed.
Electrodesiccation Text: The wound bed was treated with electrodesiccation after the biopsy was performed.
Electrodesiccation And Curettage Text: The wound bed was treated with electrodesiccation and curettage after the biopsy was performed.
Silver Nitrate Text: The wound bed was treated with silver nitrate after the biopsy was performed.
Lab: 6
Lab Facility: 3
Consent was obtained and risks were reviewed including but not limited to scarring, infection, bleeding, scabbing, incomplete removal, nerve damage and allergy to anesthesia.
Post-Care Instructions: I reviewed with the patient in detail post-care instructions. Patient is to keep the biopsy site dry overnight, and then apply bacitracin twice daily until healed. Patient may apply hydrogen peroxide soaks to remove any crusting.
Notification Instructions: Patient will be notified of biopsy results. However, patient instructed to call the office if not contacted within 2 weeks.
Billing Type: Third-Party Bill
Information: Selecting Yes will display possible errors in your note based on the variables you have selected. This validation is only offered as a suggestion for you. PLEASE NOTE THAT THE VALIDATION TEXT WILL BE REMOVED WHEN YOU FINALIZE YOUR NOTE. IF YOU WANT TO FAX A PRELIMINARY NOTE YOU WILL NEED TO TOGGLE THIS TO 'NO' IF YOU DO NOT WANT IT IN YOUR FAXED NOTE.
Size Of Lesion In Cm: 0.6

## 2024-07-30 ENCOUNTER — APPOINTMENT (RX ONLY)
Dept: URBAN - METROPOLITAN AREA CLINIC 329 | Facility: CLINIC | Age: 84
Setting detail: DERMATOLOGY
End: 2024-07-30

## 2024-07-30 PROBLEM — C44.319 BASAL CELL CARCINOMA OF SKIN OF OTHER PARTS OF FACE: Status: ACTIVE | Noted: 2024-07-30

## 2024-07-30 PROBLEM — C44.41 BASAL CELL CARCINOMA OF SKIN OF SCALP AND NECK: Status: ACTIVE | Noted: 2024-07-30

## 2024-07-30 PROBLEM — C44.519 BASAL CELL CARCINOMA OF SKIN OF OTHER PART OF TRUNK: Status: ACTIVE | Noted: 2024-07-30

## 2024-07-30 PROCEDURE — 12052 INTMD RPR FACE/MM 2.6-5.0 CM: CPT | Mod: 59

## 2024-07-30 PROCEDURE — 17263 DSTRJ MAL LES T/A/L 2.1-3.0: CPT

## 2024-07-30 PROCEDURE — ? FULL BODY SKIN EXAM - DECLINED

## 2024-07-30 PROCEDURE — 11642 EXC F/E/E/N/L MAL+MRG 1.1-2: CPT | Mod: 59

## 2024-07-30 PROCEDURE — ? CURETTAGE AND DESTRUCTION

## 2024-07-30 PROCEDURE — ? EXCISION

## 2024-07-30 PROCEDURE — ? COUNSELING

## 2024-07-30 PROCEDURE — 17272 DSTR MAL LES S/N/H/F/G 1.1-2: CPT

## 2024-07-30 NOTE — PROCEDURE: EXCISION
Biopsy Photograph Reviewed: Yes
Previous Accession (Optional): [id number]
Size Of Lesion In Cm: 0.8
X Size Of Lesion In Cm (Optional): 0
Size Of Margin In Cm: 0.2
Anesthesia Volume In Cc: 3
Was An Eye Clamp Used?: No
Eye Clamp Note Details: An eye clamp was used during the procedure.
Excision Method: Elliptical
Saucerization Depth: dermis and superficial adipose tissue
Repair Type: Intermediate
Suturegard Retention Suture: 2-0 Nylon
Retention Suture Bite Size: 3 mm
Length To Time In Minutes Device Was In Place: 10
Number Of Hemigard Strips Per Side: 1
Undermining Type: Entire Wound
Debridement Text: The wound edges were debrided prior to proceeding with the closure to facilitate wound healing.
Helical Rim Text: The closure involved the helical rim.
Vermilion Border Text: The closure involved the vermilion border.
Nostril Rim Text: The closure involved the nostril rim.
Retention Suture Text: Retention sutures were placed to support the closure and prevent dehiscence.
Suture Removal: 7 days
Lab: 6
Graft Donor Site Bandage (Optional-Leave Blank If You Don't Want In Note): Steri-strips and a pressure bandage were applied to the donor site.
Epidermal Closure Graft Donor Site (Optional): simple interrupted
Billing Type: Third-Party Bill
Excision Depth: adipose tissue
Scalpel Size: 15 blade
Anesthesia Type: 1% lidocaine with epinephrine
Hemostasis: Electrocautery
Estimated Blood Loss (Cc): minimal
Detail Level: Detailed
Repair Depth: use same depth as excision depth
Deep Sutures: 5-0 Monocryl
Epidermal Sutures: 5-0 Prolene
Epidermal Closure: running locked
Wound Care: Petrolatum
Dressing: dry sterile dressing
Suturegard Intro: Intraoperative tissue expansion was performed, utilizing the SUTUREGARD device, in order to reduce wound tension.
Suturegard Body: The suture ends were repeatedly re-tightened and re-clamped to achieve the desired tissue expansion.
Hemigard Intro: Due to skin fragility and wound tension, it was decided to use HEMIGARD adhesive retention suture devices to permit a linear closure. The skin was cleaned and dried for a 6cm distance away from the wound. Excessive hair, if present, was removed to allow for adhesion.
Hemigard Postcare Instructions: The HEMIGARD strips are to remain completely dry for at least 5-7 days.
Positioning (Leave Blank If You Do Not Want): The patient was placed in a comfortable position exposing the surgical site.
Pre-Excision Curettage Text (Leave Blank If You Do Not Want): Prior to drawing the surgical margin the visible lesion was removed with electrodesiccation and curettage to clearly define the lesion size.
Complex Repair Preamble Text (Leave Blank If You Do Not Want): Extensive wide undermining was performed.
Intermediate Repair Preamble Text (Leave Blank If You Do Not Want): Undermining was performed with blunt dissection.
Curvilinear Excision Additional Text (Leave Blank If You Do Not Want): The margin was drawn around the clinically apparent lesion.  A curvilinear shape was then drawn on the skin incorporating the lesion and margins.  Incisions were then made along these lines to the appropriate tissue plane and the lesion was extirpated.
Fusiform Excision Additional Text (Leave Blank If You Do Not Want): The margin was drawn around the clinically apparent lesion.  A fusiform shape was then drawn on the skin incorporating the lesion and margins.  Incisions were then made along these lines to the appropriate tissue plane and the lesion was extirpated.
Elliptical Excision Additional Text (Leave Blank If You Do Not Want): The margin was drawn around the clinically apparent lesion.  An elliptical shape was then drawn on the skin incorporating the lesion and margins.  Incisions were then made along these lines to the appropriate tissue plane and the lesion was extirpated.
Saucerization Excision Additional Text (Leave Blank If You Do Not Want): The margin was drawn around the clinically apparent lesion.  Incisions were then made along these lines, in a tangential fashion, to the appropriate tissue plane and the lesion was extirpated.
Slit Excision Additional Text (Leave Blank If You Do Not Want): A linear line was drawn on the skin overlying the lesion. An incision was made slowly until the lesion was visualized.  Once visualized, the lesion was removed with blunt dissection.
Excisional Biopsy Additional Text (Leave Blank If You Do Not Want): The margin was drawn around the clinically apparent lesion. An elliptical shape was then drawn on the skin incorporating the lesion and margins.  Incisions were then made along these lines to the appropriate tissue plane and the lesion was extirpated.
Perilesional Excision Additional Text (Leave Blank If You Do Not Want): The margin was drawn around the clinically apparent lesion. Incisions were then made along these lines to the appropriate tissue plane and the lesion was extirpated.
Repair Performed By Another Provider Text (Leave Blank If You Do Not Want): After the tissue was excised the defect was repaired by another provider.
No Repair - Repaired With Adjacent Surgical Defect Text (Leave Blank If You Do Not Want): After the excision the defect was repaired concurrently with another surgical defect which was in close approximation.
Adjacent Tissue Transfer Text: The defect edges were debeveled with a #15 scalpel blade.  Given the location of the defect and the proximity to free margins an adjacent tissue transfer was deemed most appropriate.  Using a sterile surgical marker, an appropriate flap was drawn incorporating the defect and placing the expected incisions within the relaxed skin tension lines where possible.    The area thus outlined was incised deep to adipose tissue with a #15 scalpel blade.  The skin margins were undermined to an appropriate distance in all directions utilizing iris scissors.
Advancement Flap (Single) Text: The defect edges were debeveled with a #15 scalpel blade.  Given the location of the defect and the proximity to free margins a single advancement flap was deemed most appropriate.  Using a sterile surgical marker, an appropriate advancement flap was drawn incorporating the defect and placing the expected incisions within the relaxed skin tension lines where possible.    The area thus outlined was incised deep to adipose tissue with a #15 scalpel blade.  The skin margins were undermined to an appropriate distance in all directions utilizing iris scissors.
Advancement Flap (Double) Text: The defect edges were debeveled with a #15 scalpel blade.  Given the location of the defect and the proximity to free margins a double advancement flap was deemed most appropriate.  Using a sterile surgical marker, the appropriate advancement flaps were drawn incorporating the defect and placing the expected incisions within the relaxed skin tension lines where possible.    The area thus outlined was incised deep to adipose tissue with a #15 scalpel blade.  The skin margins were undermined to an appropriate distance in all directions utilizing iris scissors.
Burow's Advancement Flap Text: The defect edges were debeveled with a #15 scalpel blade.  Given the location of the defect and the proximity to free margins a Burow's advancement flap was deemed most appropriate.  Using a sterile surgical marker, the appropriate advancement flap was drawn incorporating the defect and placing the expected incisions within the relaxed skin tension lines where possible.    The area thus outlined was incised deep to adipose tissue with a #15 scalpel blade.  The skin margins were undermined to an appropriate distance in all directions utilizing iris scissors.
Chonodrocutaneous Helical Advancement Flap Text: The defect edges were debeveled with a #15 scalpel blade.  Given the location of the defect and the proximity to free margins a chondrocutaneous helical advancement flap was deemed most appropriate.  Using a sterile surgical marker, the appropriate advancement flap was drawn incorporating the defect and placing the expected incisions within the relaxed skin tension lines where possible.    The area thus outlined was incised deep to adipose tissue with a #15 scalpel blade.  The skin margins were undermined to an appropriate distance in all directions utilizing iris scissors.
Crescentic Advancement Flap Text: The defect edges were debeveled with a #15 scalpel blade.  Given the location of the defect and the proximity to free margins a crescentic advancement flap was deemed most appropriate.  Using a sterile surgical marker, the appropriate advancement flap was drawn incorporating the defect and placing the expected incisions within the relaxed skin tension lines where possible.    The area thus outlined was incised deep to adipose tissue with a #15 scalpel blade.  The skin margins were undermined to an appropriate distance in all directions utilizing iris scissors.
A-T Advancement Flap Text: The defect edges were debeveled with a #15 scalpel blade.  Given the location of the defect, shape of the defect and the proximity to free margins an A-T advancement flap was deemed most appropriate.  Using a sterile surgical marker, an appropriate advancement flap was drawn incorporating the defect and placing the expected incisions within the relaxed skin tension lines where possible.    The area thus outlined was incised deep to adipose tissue with a #15 scalpel blade.  The skin margins were undermined to an appropriate distance in all directions utilizing iris scissors.
O-T Advancement Flap Text: The defect edges were debeveled with a #15 scalpel blade.  Given the location of the defect, shape of the defect and the proximity to free margins an O-T advancement flap was deemed most appropriate.  Using a sterile surgical marker, an appropriate advancement flap was drawn incorporating the defect and placing the expected incisions within the relaxed skin tension lines where possible.    The area thus outlined was incised deep to adipose tissue with a #15 scalpel blade.  The skin margins were undermined to an appropriate distance in all directions utilizing iris scissors.
O-L Flap Text: The defect edges were debeveled with a #15 scalpel blade.  Given the location of the defect, shape of the defect and the proximity to free margins an O-L flap was deemed most appropriate.  Using a sterile surgical marker, an appropriate advancement flap was drawn incorporating the defect and placing the expected incisions within the relaxed skin tension lines where possible.    The area thus outlined was incised deep to adipose tissue with a #15 scalpel blade.  The skin margins were undermined to an appropriate distance in all directions utilizing iris scissors.
O-Z Flap Text: The defect edges were debeveled with a #15 scalpel blade.  Given the location of the defect, shape of the defect and the proximity to free margins an O-Z flap was deemed most appropriate.  Using a sterile surgical marker, an appropriate transposition flap was drawn incorporating the defect and placing the expected incisions within the relaxed skin tension lines where possible. The area thus outlined was incised deep to adipose tissue with a #15 scalpel blade.  The skin margins were undermined to an appropriate distance in all directions utilizing iris scissors.
Double O-Z Flap Text: The defect edges were debeveled with a #15 scalpel blade.  Given the location of the defect, shape of the defect and the proximity to free margins a Double O-Z flap was deemed most appropriate.  Using a sterile surgical marker, an appropriate transposition flap was drawn incorporating the defect and placing the expected incisions within the relaxed skin tension lines where possible. The area thus outlined was incised deep to adipose tissue with a #15 scalpel blade.  The skin margins were undermined to an appropriate distance in all directions utilizing iris scissors.
V-Y Flap Text: The defect edges were debeveled with a #15 scalpel blade.  Given the location of the defect, shape of the defect and the proximity to free margins a V-Y flap was deemed most appropriate.  Using a sterile surgical marker, an appropriate advancement flap was drawn incorporating the defect and placing the expected incisions within the relaxed skin tension lines where possible.    The area thus outlined was incised deep to adipose tissue with a #15 scalpel blade.  The skin margins were undermined to an appropriate distance in all directions utilizing iris scissors.
Advancement-Rotation Flap Text: The defect edges were debeveled with a #15 scalpel blade.  Given the location of the defect, shape of the defect and the proximity to free margins an advancement-rotation flap was deemed most appropriate.  Using a sterile surgical marker, an appropriate flap was drawn incorporating the defect and placing the expected incisions within the relaxed skin tension lines where possible. The area thus outlined was incised deep to adipose tissue with a #15 scalpel blade.  The skin margins were undermined to an appropriate distance in all directions utilizing iris scissors.
Mercedes Flap Text: The defect edges were debeveled with a #15 scalpel blade.  Given the location of the defect, shape of the defect and the proximity to free margins a Mercedes flap was deemed most appropriate.  Using a sterile surgical marker, an appropriate advancement flap was drawn incorporating the defect and placing the expected incisions within the relaxed skin tension lines where possible. The area thus outlined was incised deep to adipose tissue with a #15 scalpel blade.  The skin margins were undermined to an appropriate distance in all directions utilizing iris scissors.
Modified Advancement Flap Text: The defect edges were debeveled with a #15 scalpel blade.  Given the location of the defect, shape of the defect and the proximity to free margins a modified advancement flap was deemed most appropriate.  Using a sterile surgical marker, an appropriate advancement flap was drawn incorporating the defect and placing the expected incisions within the relaxed skin tension lines where possible.    The area thus outlined was incised deep to adipose tissue with a #15 scalpel blade.  The skin margins were undermined to an appropriate distance in all directions utilizing iris scissors.
Mucosal Advancement Flap Text: Given the location of the defect, shape of the defect and the proximity to free margins a mucosal advancement flap was deemed most appropriate. Incisions were made with a 15 blade scalpel in the appropriate fashion along the cutaneous vermilion border and the mucosal lip. The remaining actinically damaged mucosal tissue was excised.  The mucosal advancement flap was then elevated to the gingival sulcus with care taken to preserve the neurovascular structures and advanced into the primary defect. Care was taken to ensure that precise realignment of the vermilion border was achieved.
Peng Advancement Flap Text: The defect edges were debeveled with a #15 scalpel blade.  Given the location of the defect, shape of the defect and the proximity to free margins a Peng advancement flap was deemed most appropriate.  Using a sterile surgical marker, an appropriate advancement flap was drawn incorporating the defect and placing the expected incisions within the relaxed skin tension lines where possible. The area thus outlined was incised deep to adipose tissue with a #15 scalpel blade.  The skin margins were undermined to an appropriate distance in all directions utilizing iris scissors.
Hatchet Flap Text: The defect edges were debeveled with a #15 scalpel blade.  Given the location of the defect, shape of the defect and the proximity to free margins a hatchet flap was deemed most appropriate.  Using a sterile surgical marker, an appropriate hatchet flap was drawn incorporating the defect and placing the expected incisions within the relaxed skin tension lines where possible.    The area thus outlined was incised deep to adipose tissue with a #15 scalpel blade.  The skin margins were undermined to an appropriate distance in all directions utilizing iris scissors.
Rotation Flap Text: The defect edges were debeveled with a #15 scalpel blade.  Given the location of the defect, shape of the defect and the proximity to free margins a rotation flap was deemed most appropriate.  Using a sterile surgical marker, an appropriate rotation flap was drawn incorporating the defect and placing the expected incisions within the relaxed skin tension lines where possible.    The area thus outlined was incised deep to adipose tissue with a #15 scalpel blade.  The skin margins were undermined to an appropriate distance in all directions utilizing iris scissors.
Bilateral Rotation Flap Text: The defect edges were debeveled with a #15 scalpel blade. Given the location of the defect, shape of the defect and the proximity to free margins a bilateral rotation flap was deemed most appropriate. Using a sterile surgical marker, an appropriate rotation flap was drawn incorporating the defect and placing the expected incisions within the relaxed skin tension lines where possible. The area thus outlined was incised deep to adipose tissue with a #15 scalpel blade. The skin margins were undermined to an appropriate distance in all directions utilizing iris scissors. Following this, the designed flap was carried over into the primary defect and sutured into place.
Spiral Flap Text: The defect edges were debeveled with a #15 scalpel blade.  Given the location of the defect, shape of the defect and the proximity to free margins a spiral flap was deemed most appropriate.  Using a sterile surgical marker, an appropriate rotation flap was drawn incorporating the defect and placing the expected incisions within the relaxed skin tension lines where possible. The area thus outlined was incised deep to adipose tissue with a #15 scalpel blade.  The skin margins were undermined to an appropriate distance in all directions utilizing iris scissors.
Staged Advancement Flap Text: The defect edges were debeveled with a #15 scalpel blade.  Given the location of the defect, shape of the defect and the proximity to free margins a staged advancement flap was deemed most appropriate.  Using a sterile surgical marker, an appropriate advancement flap was drawn incorporating the defect and placing the expected incisions within the relaxed skin tension lines where possible. The area thus outlined was incised deep to adipose tissue with a #15 scalpel blade.  The skin margins were undermined to an appropriate distance in all directions utilizing iris scissors.
Star Wedge Flap Text: The defect edges were debeveled with a #15 scalpel blade.  Given the location of the defect, shape of the defect and the proximity to free margins a star wedge flap was deemed most appropriate.  Using a sterile surgical marker, an appropriate rotation flap was drawn incorporating the defect and placing the expected incisions within the relaxed skin tension lines where possible. The area thus outlined was incised deep to adipose tissue with a #15 scalpel blade.  The skin margins were undermined to an appropriate distance in all directions utilizing iris scissors.
Transposition Flap Text: The defect edges were debeveled with a #15 scalpel blade.  Given the location of the defect and the proximity to free margins a transposition flap was deemed most appropriate.  Using a sterile surgical marker, an appropriate transposition flap was drawn incorporating the defect.    The area thus outlined was incised deep to adipose tissue with a #15 scalpel blade.  The skin margins were undermined to an appropriate distance in all directions utilizing iris scissors.
Muscle Hinge Flap Text: The defect edges were debeveled with a #15 scalpel blade.  Given the size, depth and location of the defect and the proximity to free margins a muscle hinge flap was deemed most appropriate.  Using a sterile surgical marker, an appropriate hinge flap was drawn incorporating the defect. The area thus outlined was incised with a #15 scalpel blade.  The skin margins were undermined to an appropriate distance in all directions utilizing iris scissors.
Mustarde Flap Text: The defect edges were debeveled with a #15 scalpel blade.  Given the size, depth and location of the defect and the proximity to free margins a Mustarde flap was deemed most appropriate.  Using a sterile surgical marker, an appropriate flap was drawn incorporating the defect. The area thus outlined was incised with a #15 scalpel blade.  The skin margins were undermined to an appropriate distance in all directions utilizing iris scissors.
Nasal Turnover Hinge Flap Text: The defect edges were debeveled with a #15 scalpel blade.  Given the size, depth, location of the defect and the defect being full thickness a nasal turnover hinge flap was deemed most appropriate.  Using a sterile surgical marker, an appropriate hinge flap was drawn incorporating the defect. The area thus outlined was incised with a #15 scalpel blade. The flap was designed to recreate the nasal mucosal lining and the alar rim. The skin margins were undermined to an appropriate distance in all directions utilizing iris scissors.
Nasalis-Muscle-Based Myocutaneous Island Pedicle Flap Text: Using a #15 blade, an incision was made around the donor flap to the level of the nasalis muscle. Wide lateral undermining was then performed in both the subcutaneous plane above the nasalis muscle, and in a submuscular plane just above periosteum. This allowed the formation of a free nasalis muscle axial pedicle (based on the angular artery) which was still attached to the actual cutaneous flap, increasing its mobility and vascular viability. Hemostasis was obtained with pinpoint electrocoagulation. The flap was mobilized into position and the pivotal anchor points positioned and stabilized with buried interrupted sutures. Subcutaneous and dermal tissues were closed in a multilayered fashion with sutures. Tissue redundancies were excised, and the epidermal edges were apposed without significant tension and sutured with sutures.
Nasalis Myocutaneous Flap Text: Using a #15 blade, an incision was made around the donor flap to the level of the nasalis muscle. Wide lateral undermining was then performed in both the subcutaneous plane above the nasalis muscle, and in a submuscular plane just above periosteum. This allowed the formation of a free nasalis muscle axial pedicle which was still attached to the actual cutaneous flap, increasing its mobility and vascular viability. Hemostasis was obtained with pinpoint electrocoagulation. The flap was mobilized into position and the pivotal anchor points positioned and stabilized with buried interrupted sutures. Subcutaneous and dermal tissues were closed in a multilayered fashion with sutures. Tissue redundancies were excised, and the epidermal edges were apposed without significant tension and sutured with sutures.
Nasolabial Transposition Flap Text: The defect edges were debeveled with a #15 scalpel blade.  Given the size, depth and location of the defect and the proximity to free margins a nasolabial transposition flap was deemed most appropriate. Using a sterile surgical marker, an appropriate flap was drawn incorporating the defect. The area thus outlined was incised with a #15 scalpel blade. The skin margins were undermined to an appropriate distance in all directions utilizing iris scissors. Following this, the designed flap was carried into the primary defect and sutured into place.
Orbicularis Oris Muscle Flap Text: The defect edges were debeveled with a #15 scalpel blade.  Given that the defect affected the competency of the oral sphincter an orbicularis oris muscle flap was deemed most appropriate to restore this competency and normal muscle function.  Using a sterile surgical marker, an appropriate flap was drawn incorporating the defect. The area thus outlined was incised with a #15 scalpel blade.
Melolabial Transposition Flap Text: The defect edges were debeveled with a #15 scalpel blade.  Given the location of the defect and the proximity to free margins a melolabial flap was deemed most appropriate.  Using a sterile surgical marker, an appropriate melolabial transposition flap was drawn incorporating the defect.    The area thus outlined was incised deep to adipose tissue with a #15 scalpel blade.  The skin margins were undermined to an appropriate distance in all directions utilizing iris scissors.
Rectangular Flap Text: The defect edges were debeveled with a #15 scalpel blade. Given the location of the defect and the proximity to free margins a rectangular flap was deemed most appropriate. Using a sterile surgical marker, an appropriate rectangular flap was drawn incorporating the defect. The area thus outlined was incised deep to adipose tissue with a #15 scalpel blade. The skin margins were undermined to an appropriate distance in all directions utilizing iris scissors. Following this, the designed flap was carried over into the primary defect and sutured into place.
Rhombic Flap Text: The defect edges were debeveled with a #15 scalpel blade.  Given the location of the defect and the proximity to free margins a rhombic flap was deemed most appropriate.  Using a sterile surgical marker, an appropriate rhombic flap was drawn incorporating the defect.    The area thus outlined was incised deep to adipose tissue with a #15 scalpel blade.  The skin margins were undermined to an appropriate distance in all directions utilizing iris scissors.
Rhomboid Transposition Flap Text: The defect edges were debeveled with a #15 scalpel blade.  Given the location of the defect and the proximity to free margins a rhomboid transposition flap was deemed most appropriate.  Using a sterile surgical marker, an appropriate rhomboid flap was drawn incorporating the defect.    The area thus outlined was incised deep to adipose tissue with a #15 scalpel blade.  The skin margins were undermined to an appropriate distance in all directions utilizing iris scissors.
Bi-Rhombic Flap Text: The defect edges were debeveled with a #15 scalpel blade.  Given the location of the defect and the proximity to free margins a bi-rhombic flap was deemed most appropriate.  Using a sterile surgical marker, an appropriate rhombic flap was drawn incorporating the defect. The area thus outlined was incised deep to adipose tissue with a #15 scalpel blade.  The skin margins were undermined to an appropriate distance in all directions utilizing iris scissors.
Helical Rim Advancement Flap Text: The defect edges were debeveled with a #15 blade scalpel.  Given the location of the defect and the proximity to free margins (helical rim) a double helical rim advancement flap was deemed most appropriate.  Using a sterile surgical marker, the appropriate advancement flaps were drawn incorporating the defect and placing the expected incisions between the helical rim and antihelix where possible.  The area thus outlined was incised through and through with a #15 scalpel blade.  With a skin hook and iris scissors, the flaps were gently and sharply undermined and freed up.
Bilateral Helical Rim Advancement Flap Text: The defect edges were debeveled with a #15 blade scalpel.  Given the location of the defect and the proximity to free margins (helical rim) a bilateral helical rim advancement flap was deemed most appropriate.  Using a sterile surgical marker, the appropriate advancement flaps were drawn incorporating the defect and placing the expected incisions between the helical rim and antihelix where possible.  The area thus outlined was incised through and through with a #15 scalpel blade.  With a skin hook and iris scissors, the flaps were gently and sharply undermined and freed up.
Ear Star Wedge Flap Text: The defect edges were debeveled with a #15 blade scalpel.  Given the location of the defect and the proximity to free margins (helical rim) an ear star wedge flap was deemed most appropriate.  Using a sterile surgical marker, the appropriate flap was drawn incorporating the defect and placing the expected incisions between the helical rim and antihelix where possible.  The area thus outlined was incised through and through with a #15 scalpel blade.
Flip-Flop Flap Text: The defect edges were debeveled with a #15 blade scalpel.  Given the location of the defect and the proximity to free margins a flip-flop flap was deemed most appropriate. Using a sterile surgical marker, the appropriate flap was drawn incorporating the defect and placing the expected incisions between the helical rim and antihelix where possible.  The area thus outlined was incised through and through with a #15 scalpel blade. Following this, the designed flap was carried over into the primary defect and sutured into place.
Banner Transposition Flap Text: The defect edges were debeveled with a #15 scalpel blade.  Given the location of the defect and the proximity to free margins a Banner transposition flap was deemed most appropriate.  Using a sterile surgical marker, an appropriate flap drawn around the defect. The area thus outlined was incised deep to adipose tissue with a #15 scalpel blade.  The skin margins were undermined to an appropriate distance in all directions utilizing iris scissors.
Bilobed Flap Text: The defect edges were debeveled with a #15 scalpel blade.  Given the location of the defect and the proximity to free margins a bilobe flap was deemed most appropriate.  Using a sterile surgical marker, an appropriate bilobe flap drawn around the defect.    The area thus outlined was incised deep to adipose tissue with a #15 scalpel blade.  The skin margins were undermined to an appropriate distance in all directions utilizing iris scissors.
Bilobed Transposition Flap Text: The defect edges were debeveled with a #15 scalpel blade.  Given the location of the defect and the proximity to free margins a bilobed transposition flap was deemed most appropriate.  Using a sterile surgical marker, an appropriate bilobe flap drawn around the defect.    The area thus outlined was incised deep to adipose tissue with a #15 scalpel blade.  The skin margins were undermined to an appropriate distance in all directions utilizing iris scissors.
Trilobed Flap Text: The defect edges were debeveled with a #15 scalpel blade.  Given the location of the defect and the proximity to free margins a trilobed flap was deemed most appropriate.  Using a sterile surgical marker, an appropriate trilobed flap drawn around the defect.    The area thus outlined was incised deep to adipose tissue with a #15 scalpel blade.  The skin margins were undermined to an appropriate distance in all directions utilizing iris scissors.
Dorsal Nasal Flap Text: The defect edges were debeveled with a #15 scalpel blade.  Given the location of the defect and the proximity to free margins a dorsal nasal flap was deemed most appropriate.  Using a sterile surgical marker, an appropriate dorsal nasal flap was drawn around the defect.    The area thus outlined was incised deep to adipose tissue with a #15 scalpel blade.  The skin margins were undermined to an appropriate distance in all directions utilizing iris scissors.
Island Pedicle Flap Text: The defect edges were debeveled with a #15 scalpel blade.  Given the location of the defect, shape of the defect and the proximity to free margins an island pedicle advancement flap was deemed most appropriate.  Using a sterile surgical marker, an appropriate advancement flap was drawn incorporating the defect, outlining the appropriate donor tissue and placing the expected incisions within the relaxed skin tension lines where possible.    The area thus outlined was incised deep to adipose tissue with a #15 scalpel blade.  The skin margins were undermined to an appropriate distance in all directions around the primary defect and laterally outward around the island pedicle utilizing iris scissors.  There was minimal undermining beneath the pedicle flap.
Island Pedicle Flap With Canthal Suspension Text: The defect edges were debeveled with a #15 scalpel blade.  Given the location of the defect, shape of the defect and the proximity to free margins an island pedicle advancement flap was deemed most appropriate.  Using a sterile surgical marker, an appropriate advancement flap was drawn incorporating the defect, outlining the appropriate donor tissue and placing the expected incisions within the relaxed skin tension lines where possible. The area thus outlined was incised deep to adipose tissue with a #15 scalpel blade.  The skin margins were undermined to an appropriate distance in all directions around the primary defect and laterally outward around the island pedicle utilizing iris scissors.  There was minimal undermining beneath the pedicle flap. A suspension suture was placed in the canthal tendon to prevent tension and prevent ectropion.
Alar Island Pedicle Flap Text: The defect edges were debeveled with a #15 scalpel blade.  Given the location of the defect, shape of the defect and the proximity to the alar rim an island pedicle advancement flap was deemed most appropriate.  Using a sterile surgical marker, an appropriate advancement flap was drawn incorporating the defect, outlining the appropriate donor tissue and placing the expected incisions within the nasal ala running parallel to the alar rim. The area thus outlined was incised with a #15 scalpel blade.  The skin margins were undermined minimally to an appropriate distance in all directions around the primary defect and laterally outward around the island pedicle utilizing iris scissors.  There was minimal undermining beneath the pedicle flap.
Double Island Pedicle Flap Text: The defect edges were debeveled with a #15 scalpel blade.  Given the location of the defect, shape of the defect and the proximity to free margins a double island pedicle advancement flap was deemed most appropriate.  Using a sterile surgical marker, an appropriate advancement flap was drawn incorporating the defect, outlining the appropriate donor tissue and placing the expected incisions within the relaxed skin tension lines where possible.    The area thus outlined was incised deep to adipose tissue with a #15 scalpel blade.  The skin margins were undermined to an appropriate distance in all directions around the primary defect and laterally outward around the island pedicle utilizing iris scissors.  There was minimal undermining beneath the pedicle flap.
Island Pedicle Flap-Requiring Vessel Identification Text: The defect edges were debeveled with a #15 scalpel blade.  Given the location of the defect, shape of the defect and the proximity to free margins an island pedicle advancement flap was deemed most appropriate.  Using a sterile surgical marker, an appropriate advancement flap was drawn, based on the axial vessel mentioned above, incorporating the defect, outlining the appropriate donor tissue and placing the expected incisions within the relaxed skin tension lines where possible.    The area thus outlined was incised deep to adipose tissue with a #15 scalpel blade.  The skin margins were undermined to an appropriate distance in all directions around the primary defect and laterally outward around the island pedicle utilizing iris scissors.  There was minimal undermining beneath the pedicle flap.
Keystone Flap Text: The defect edges were debeveled with a #15 scalpel blade.  Given the location of the defect, shape of the defect a keystone flap was deemed most appropriate.  Using a sterile surgical marker, an appropriate keystone flap was drawn incorporating the defect, outlining the appropriate donor tissue and placing the expected incisions within the relaxed skin tension lines where possible. The area thus outlined was incised deep to adipose tissue with a #15 scalpel blade.  The skin margins were undermined to an appropriate distance in all directions around the primary defect and laterally outward around the flap utilizing iris scissors.
O-T Plasty Text: The defect edges were debeveled with a #15 scalpel blade.  Given the location of the defect, shape of the defect and the proximity to free margins an O-T plasty was deemed most appropriate.  Using a sterile surgical marker, an appropriate O-T plasty was drawn incorporating the defect and placing the expected incisions within the relaxed skin tension lines where possible.    The area thus outlined was incised deep to adipose tissue with a #15 scalpel blade.  The skin margins were undermined to an appropriate distance in all directions utilizing iris scissors.
O-Z Plasty Text: The defect edges were debeveled with a #15 scalpel blade.  Given the location of the defect, shape of the defect and the proximity to free margins an O-Z plasty (double transposition flap) was deemed most appropriate.  Using a sterile surgical marker, the appropriate transposition flaps were drawn incorporating the defect and placing the expected incisions within the relaxed skin tension lines where possible.    The area thus outlined was incised deep to adipose tissue with a #15 scalpel blade.  The skin margins were undermined to an appropriate distance in all directions utilizing iris scissors.  Hemostasis was achieved with electrocautery.  The flaps were then transposed into place, one clockwise and the other counterclockwise, and anchored with interrupted buried subcutaneous sutures.
Double O-Z Plasty Text: The defect edges were debeveled with a #15 scalpel blade.  Given the location of the defect, shape of the defect and the proximity to free margins a Double O-Z plasty (double transposition flap) was deemed most appropriate.  Using a sterile surgical marker, the appropriate transposition flaps were drawn incorporating the defect and placing the expected incisions within the relaxed skin tension lines where possible. The area thus outlined was incised deep to adipose tissue with a #15 scalpel blade.  The skin margins were undermined to an appropriate distance in all directions utilizing iris scissors.  Hemostasis was achieved with electrocautery.  The flaps were then transposed into place, one clockwise and the other counterclockwise, and anchored with interrupted buried subcutaneous sutures.
V-Y Plasty Text: The defect edges were debeveled with a #15 scalpel blade.  Given the location of the defect, shape of the defect and the proximity to free margins an V-Y advancement flap was deemed most appropriate.  Using a sterile surgical marker, an appropriate advancement flap was drawn incorporating the defect and placing the expected incisions within the relaxed skin tension lines where possible.    The area thus outlined was incised deep to adipose tissue with a #15 scalpel blade.  The skin margins were undermined to an appropriate distance in all directions utilizing iris scissors.
H Plasty Text: Given the location of the defect, shape of the defect and the proximity to free margins a H-plasty was deemed most appropriate for repair.  Using a sterile surgical marker, the appropriate advancement arms of the H-plasty were drawn incorporating the defect and placing the expected incisions within the relaxed skin tension lines where possible. The area thus outlined was incised deep to adipose tissue with a #15 scalpel blade. The skin margins were undermined to an appropriate distance in all directions utilizing iris scissors.  The opposing advancement arms were then advanced into place in opposite direction and anchored with interrupted buried subcutaneous sutures.
W Plasty Text: The lesion was extirpated to the level of the fat with a #15 scalpel blade.  Given the location of the defect, shape of the defect and the proximity to free margins a W-plasty was deemed most appropriate for repair.  Using a sterile surgical marker, the appropriate transposition arms of the W-plasty were drawn incorporating the defect and placing the expected incisions within the relaxed skin tension lines where possible.    The area thus outlined was incised deep to adipose tissue with a #15 scalpel blade.  The skin margins were undermined to an appropriate distance in all directions utilizing iris scissors.  The opposing transposition arms were then transposed into place in opposite direction and anchored with interrupted buried subcutaneous sutures.
Z Plasty Text: The lesion was extirpated to the level of the fat with a #15 scalpel blade.  Given the location of the defect, shape of the defect and the proximity to free margins a Z-plasty was deemed most appropriate for repair.  Using a sterile surgical marker, the appropriate transposition arms of the Z-plasty were drawn incorporating the defect and placing the expected incisions within the relaxed skin tension lines where possible.    The area thus outlined was incised deep to adipose tissue with a #15 scalpel blade.  The skin margins were undermined to an appropriate distance in all directions utilizing iris scissors.  The opposing transposition arms were then transposed into place in opposite direction and anchored with interrupted buried subcutaneous sutures.
Double Z Plasty Text: The lesion was extirpated to the level of the fat with a #15 scalpel blade. Given the location of the defect, shape of the defect and the proximity to free margins a double Z-plasty was deemed most appropriate for repair. Using a sterile surgical marker, the appropriate transposition arms of the double Z-plasty were drawn incorporating the defect and placing the expected incisions within the relaxed skin tension lines where possible. The area thus outlined was incised deep to adipose tissue with a #15 scalpel blade. The skin margins were undermined to an appropriate distance in all directions utilizing iris scissors. The opposing transposition arms were then transposed and carried over into place in opposite direction and anchored with interrupted buried subcutaneous sutures.
Zygomaticofacial Flap Text: Given the location of the defect, shape of the defect and the proximity to free margins a zygomaticofacial flap was deemed most appropriate for repair.  Using a sterile surgical marker, the appropriate flap was drawn incorporating the defect and placing the expected incisions within the relaxed skin tension lines where possible. The area thus outlined was incised deep to adipose tissue with a #15 scalpel blade with preservation of a vascular pedicle.  The skin margins were undermined to an appropriate distance in all directions utilizing iris scissors.  The flap was then placed into the defect and anchored with interrupted buried subcutaneous sutures.
Cheek Interpolation Flap Text: A decision was made to reconstruct the defect utilizing an interpolation axial flap and a staged reconstruction.  A telfa template was made of the defect.  This telfa template was then used to outline the Cheek Interpolation flap.  The donor area for the pedicle flap was then injected with anesthesia.  The flap was excised through the skin and subcutaneous tissue down to the layer of the underlying musculature.  The interpolation flap was carefully excised within this deep plane to maintain its blood supply.  The edges of the donor site were undermined.   The donor site was closed in a primary fashion.  The pedicle was then rotated into position and sutured.  Once the tube was sutured into place, adequate blood supply was confirmed with blanching and refill.  The pedicle was then wrapped with xeroform gauze and dressed appropriately with a telfa and gauze bandage to ensure continued blood supply and protect the attached pedicle.
Cheek-To-Nose Interpolation Flap Text: A decision was made to reconstruct the defect utilizing an interpolation axial flap and a staged reconstruction.  A telfa template was made of the defect.  This telfa template was then used to outline the Cheek-To-Nose Interpolation flap.  The donor area for the pedicle flap was then injected with anesthesia.  The flap was excised through the skin and subcutaneous tissue down to the layer of the underlying musculature.  The interpolation flap was carefully excised within this deep plane to maintain its blood supply.  The edges of the donor site were undermined.   The donor site was closed in a primary fashion.  The pedicle was then rotated into position and sutured.  Once the tube was sutured into place, adequate blood supply was confirmed with blanching and refill.  The pedicle was then wrapped with xeroform gauze and dressed appropriately with a telfa and gauze bandage to ensure continued blood supply and protect the attached pedicle.
Interpolation Flap Text: A decision was made to reconstruct the defect utilizing an interpolation axial flap and a staged reconstruction.  A telfa template was made of the defect.  This telfa template was then used to outline the interpolation flap.  The donor area for the pedicle flap was then injected with anesthesia.  The flap was excised through the skin and subcutaneous tissue down to the layer of the underlying musculature.  The interpolation flap was carefully excised within this deep plane to maintain its blood supply.  The edges of the donor site were undermined.   The donor site was closed in a primary fashion.  The pedicle was then rotated into position and sutured.  Once the tube was sutured into place, adequate blood supply was confirmed with blanching and refill.  The pedicle was then wrapped with xeroform gauze and dressed appropriately with a telfa and gauze bandage to ensure continued blood supply and protect the attached pedicle.
Melolabial Interpolation Flap Text: A decision was made to reconstruct the defect utilizing an interpolation axial flap and a staged reconstruction.  A telfa template was made of the defect.  This telfa template was then used to outline the melolabial interpolation flap.  The donor area for the pedicle flap was then injected with anesthesia.  The flap was excised through the skin and subcutaneous tissue down to the layer of the underlying musculature.  The pedicle flap was carefully excised within this deep plane to maintain its blood supply.  The edges of the donor site were undermined.   The donor site was closed in a primary fashion.  The pedicle was then rotated into position and sutured.  Once the tube was sutured into place, adequate blood supply was confirmed with blanching and refill.  The pedicle was then wrapped with xeroform gauze and dressed appropriately with a telfa and gauze bandage to ensure continued blood supply and protect the attached pedicle.
Mastoid Interpolation Flap Text: A decision was made to reconstruct the defect utilizing an interpolation axial flap and a staged reconstruction.  A telfa template was made of the defect.  This telfa template was then used to outline the mastoid interpolation flap.  The donor area for the pedicle flap was then injected with anesthesia.  The flap was excised through the skin and subcutaneous tissue down to the layer of the underlying musculature.  The pedicle flap was carefully excised within this deep plane to maintain its blood supply.  The edges of the donor site were undermined.   The donor site was closed in a primary fashion.  The pedicle was then rotated into position and sutured.  Once the tube was sutured into place, adequate blood supply was confirmed with blanching and refill.  The pedicle was then wrapped with xeroform gauze and dressed appropriately with a telfa and gauze bandage to ensure continued blood supply and protect the attached pedicle.
Posterior Auricular Interpolation Flap Text: A decision was made to reconstruct the defect utilizing an interpolation axial flap and a staged reconstruction.  A telfa template was made of the defect.  This telfa template was then used to outline the posterior auricular interpolation flap.  The donor area for the pedicle flap was then injected with anesthesia.  The flap was excised through the skin and subcutaneous tissue down to the layer of the underlying musculature.  The pedicle flap was carefully excised within this deep plane to maintain its blood supply.  The edges of the donor site were undermined.   The donor site was closed in a primary fashion.  The pedicle was then rotated into position and sutured.  Once the tube was sutured into place, adequate blood supply was confirmed with blanching and refill.  The pedicle was then wrapped with xeroform gauze and dressed appropriately with a telfa and gauze bandage to ensure continued blood supply and protect the attached pedicle.
Paramedian Forehead Flap Text: A decision was made to reconstruct the defect utilizing an interpolation axial flap and a staged reconstruction.  A telfa template was made of the defect.  This telfa template was then used to outline the paramedian forehead pedicle flap.  The donor area for the pedicle flap was then injected with anesthesia.  The flap was excised through the skin and subcutaneous tissue down to the layer of the underlying musculature.  The pedicle flap was carefully excised within this deep plane to maintain its blood supply.  The edges of the donor site were undermined.   The donor site was closed in a primary fashion.  The pedicle was then rotated into position and sutured.  Once the tube was sutured into place, adequate blood supply was confirmed with blanching and refill.  The pedicle was then wrapped with xeroform gauze and dressed appropriately with a telfa and gauze bandage to ensure continued blood supply and protect the attached pedicle.
Abbe Flap (Upper To Lower Lip) Text: The defect of the lower lip was assessed and measured.  Given the location and size of the defect, an Abbe flap was deemed most appropriate. Using a sterile surgical marker, an appropriate Abbe flap was measured and drawn on the upper lip. Local anesthesia was then infiltrated.  A scalpel was then used to incise the upper lip through and through the skin, vermilion, muscle and mucosa, leaving the flap pedicled on the opposite side.  The flap was then rotated and transferred to the lower lip defect.  The flap was then sutured into place with a three layer technique, closing the orbicularis oris muscle layer with subcutaneous buried sutures, followed by a mucosal layer and an epidermal layer.
Abbe Flap (Lower To Upper Lip) Text: The defect of the upper lip was assessed and measured.  Given the location and size of the defect, an Abbe flap was deemed most appropriate. Using a sterile surgical marker, an appropriate Abbe flap was measured and drawn on the lower lip. Local anesthesia was then infiltrated. A scalpel was then used to incise the upper lip through and through the skin, vermilion, muscle and mucosa, leaving the flap pedicled on the opposite side.  The flap was then rotated and transferred to the lower lip defect.  The flap was then sutured into place with a three layer technique, closing the orbicularis oris muscle layer with subcutaneous buried sutures, followed by a mucosal layer and an epidermal layer.
Estlander Flap (Upper To Lower Lip) Text: The defect of the lower lip was assessed and measured.  Given the location and size of the defect, an Estlander flap was deemed most appropriate. Using a sterile surgical marker, an appropriate Estlander flap was measured and drawn on the upper lip. Local anesthesia was then infiltrated. A scalpel was then used to incise the lateral aspect of the flap, through skin, muscle and mucosa, leaving the flap pedicled medially.  The flap was then rotated and positioned to fill the lower lip defect.  The flap was then sutured into place with a three layer technique, closing the orbicularis oris muscle layer with subcutaneous buried sutures, followed by a mucosal layer and an epidermal layer.
Lip Wedge Excision Repair Text: Given the location of the defect and the proximity to free margins a full thickness wedge repair was deemed most appropriate.  Using a sterile surgical marker, the appropriate repair was drawn incorporating the defect and placing the expected incisions perpendicular to the vermilion border.  The vermilion border was also meticulously outlined to ensure appropriate reapproximation during the repair.  The area thus outlined was incised through and through with a #15 scalpel blade.  The muscularis and dermis were reaproximated with deep sutures following hemostasis. Care was taken to realign the vermilion border before proceeding with the superficial closure.  Once the vermilion was realigned the superfical and mucosal closure was finished.
Ftsg Text: The defect edges were debeveled with a #15 scalpel blade.  Given the location of the defect, shape of the defect and the proximity to free margins a full thickness skin graft was deemed most appropriate.  Using a sterile surgical marker, the primary defect shape was transferred to the donor site. The area thus outlined was incised deep to adipose tissue with a #15 scalpel blade.  The harvested graft was then trimmed of adipose tissue until only dermis and epidermis was left.  The skin margins of the secondary defect were undermined to an appropriate distance in all directions utilizing iris scissors.  The secondary defect was closed with interrupted buried subcutaneous sutures.  The skin edges were then re-apposed with running  sutures.  The skin graft was then placed in the primary defect and oriented appropriately.
Split-Thickness Skin Graft Text: The defect edges were debeveled with a #15 scalpel blade.  Given the location of the defect, shape of the defect and the proximity to free margins a split thickness skin graft was deemed most appropriate.  Using a sterile surgical marker, the primary defect shape was transferred to the donor site. The split thickness graft was then harvested.  The skin graft was then placed in the primary defect and oriented appropriately.
Pinch Graft Text: The defect edges were debeveled with a #15 scalpel blade. Given the location of the defect, shape of the defect and the proximity to free margins a pinch graft was deemed most appropriate. Using a sterile surgical marker, the primary defect shape was transferred to the donor site. The area thus outlined was incised deep to adipose tissue with a #15 scalpel blade.  The harvested graft was then trimmed of adipose tissue until only dermis and epidermis was left. The skin graft was then placed in the primary defect and oriented appropriately.
Burow's Graft Text: The defect edges were debeveled with a #15 scalpel blade.  Given the location of the defect, shape of the defect, the proximity to free margins and the presence of a standing cone deformity a Burow's skin graft was deemed most appropriate. The standing cone was removed and this tissue was then trimmed to the shape of the primary defect. The adipose tissue was also removed until only dermis and epidermis were left.  The skin margins of the secondary defect were undermined to an appropriate distance in all directions utilizing iris scissors.  The secondary defect was closed with interrupted buried subcutaneous sutures.  The skin edges were then re-apposed with running  sutures.  The skin graft was then placed in the primary defect and oriented appropriately.
Cartilage Graft Text: The defect edges were debeveled with a #15 scalpel blade.  Given the location of the defect, shape of the defect, the fact the defect involved a full thickness cartilage defect a cartilage graft was deemed most appropriate.  An appropriate donor site was identified, cleansed, and anesthetized. The cartilage graft was then harvested and transferred to the recipient site, oriented appropriately and then sutured into place.  The secondary defect was then repaired using a primary closure.
Composite Graft Text: The defect edges were debeveled with a #15 scalpel blade.  Given the location of the defect, shape of the defect, the proximity to free margins and the fact the defect was full thickness a composite graft was deemed most appropriate.  The defect was outline and then transferred to the donor site.  A full thickness graft was then excised from the donor site. The graft was then placed in the primary defect, oriented appropriately and then sutured into place.  The secondary defect was then repaired using a primary closure.
Epidermal Autograft Text: The defect edges were debeveled with a #15 scalpel blade.  Given the location of the defect, shape of the defect and the proximity to free margins an epidermal autograft was deemed most appropriate.  Using a sterile surgical marker, the primary defect shape was transferred to the donor site. The epidermal graft was then harvested.  The skin graft was then placed in the primary defect and oriented appropriately.
Dermal Autograft Text: The defect edges were debeveled with a #15 scalpel blade.  Given the location of the defect, shape of the defect and the proximity to free margins a dermal autograft was deemed most appropriate.  Using a sterile surgical marker, the primary defect shape was transferred to the donor site. The area thus outlined was incised deep to adipose tissue with a #15 scalpel blade.  The harvested graft was then trimmed of adipose and epidermal tissue until only dermis was left.  The skin graft was then placed in the primary defect and oriented appropriately.
Skin Substitute Text: The defect edges were debeveled with a #15 scalpel blade.  Given the location of the defect, shape of the defect and the proximity to free margins a skin substitute graft was deemed most appropriate.  The graft material was trimmed to fit the size of the defect. The graft was then placed in the primary defect and oriented appropriately.
Tissue Cultured Epidermal Autograft Text: The defect edges were debeveled with a #15 scalpel blade.  Given the location of the defect, shape of the defect and the proximity to free margins a tissue cultured epidermal autograft was deemed most appropriate.  The graft was then trimmed to fit the size of the defect.  The graft was then placed in the primary defect and oriented appropriately.
Xenograft Text: The defect edges were debeveled with a #15 scalpel blade.  Given the location of the defect, shape of the defect and the proximity to free margins a xenograft was deemed most appropriate.  The graft was then trimmed to fit the size of the defect.  The graft was then placed in the primary defect and oriented appropriately.
Purse String (Intermediate) Text: Given the location of the defect and the characteristics of the surrounding skin a purse string intermediate closure was deemed most appropriate.  Undermining was performed circumfirentially around the surgical defect.  A purse string suture was then placed and tightened.
Purse String (Simple) Text: Given the location of the defect and the characteristics of the surrounding skin a purse string simple closure was deemed most appropriate.  Undermining was performed circumferentially around the surgical defect.  A purse string suture was then placed and tightened.
Partial Purse String (Intermediate) Text: Given the location of the defect and the characteristics of the surrounding skin an intermediate purse string closure was deemed most appropriate.  Undermining was performed circumferentially around the surgical defect.  A purse string suture was then placed and tightened. Wound tension of the circular defect prevented complete closure of the wound.
Partial Purse String (Simple) Text: Given the location of the defect and the characteristics of the surrounding skin a simple purse string closure was deemed most appropriate.  Undermining was performed circumferentially around the surgical defect.  A purse string suture was then placed and tightened. Wound tension of the circular defect prevented complete closure of the wound.
Complex Repair And Single Advancement Flap Text: The defect edges were debeveled with a #15 scalpel blade.  The primary defect was closed partially with a complex linear closure.  Given the location of the remaining defect, shape of the defect and the proximity to free margins a single advancement flap was deemed most appropriate for complete closure of the defect.  Using a sterile surgical marker, an appropriate advancement flap was drawn incorporating the defect and placing the expected incisions within the relaxed skin tension lines where possible.    The area thus outlined was incised deep to adipose tissue with a #15 scalpel blade.  The skin margins were undermined to an appropriate distance in all directions utilizing iris scissors.
Complex Repair And Double Advancement Flap Text: The defect edges were debeveled with a #15 scalpel blade.  The primary defect was closed partially with a complex linear closure.  Given the location of the remaining defect, shape of the defect and the proximity to free margins a double advancement flap was deemed most appropriate for complete closure of the defect.  Using a sterile surgical marker, an appropriate advancement flap was drawn incorporating the defect and placing the expected incisions within the relaxed skin tension lines where possible.    The area thus outlined was incised deep to adipose tissue with a #15 scalpel blade.  The skin margins were undermined to an appropriate distance in all directions utilizing iris scissors.
Complex Repair And Modified Advancement Flap Text: The defect edges were debeveled with a #15 scalpel blade.  The primary defect was closed partially with a complex linear closure.  Given the location of the remaining defect, shape of the defect and the proximity to free margins a modified advancement flap was deemed most appropriate for complete closure of the defect.  Using a sterile surgical marker, an appropriate advancement flap was drawn incorporating the defect and placing the expected incisions within the relaxed skin tension lines where possible.    The area thus outlined was incised deep to adipose tissue with a #15 scalpel blade.  The skin margins were undermined to an appropriate distance in all directions utilizing iris scissors.
Complex Repair And A-T Advancement Flap Text: The defect edges were debeveled with a #15 scalpel blade.  The primary defect was closed partially with a complex linear closure.  Given the location of the remaining defect, shape of the defect and the proximity to free margins an A-T advancement flap was deemed most appropriate for complete closure of the defect.  Using a sterile surgical marker, an appropriate advancement flap was drawn incorporating the defect and placing the expected incisions within the relaxed skin tension lines where possible.    The area thus outlined was incised deep to adipose tissue with a #15 scalpel blade.  The skin margins were undermined to an appropriate distance in all directions utilizing iris scissors.
Complex Repair And O-T Advancement Flap Text: The defect edges were debeveled with a #15 scalpel blade.  The primary defect was closed partially with a complex linear closure.  Given the location of the remaining defect, shape of the defect and the proximity to free margins an O-T advancement flap was deemed most appropriate for complete closure of the defect.  Using a sterile surgical marker, an appropriate advancement flap was drawn incorporating the defect and placing the expected incisions within the relaxed skin tension lines where possible.    The area thus outlined was incised deep to adipose tissue with a #15 scalpel blade.  The skin margins were undermined to an appropriate distance in all directions utilizing iris scissors.
Complex Repair And O-L Flap Text: The defect edges were debeveled with a #15 scalpel blade.  The primary defect was closed partially with a complex linear closure.  Given the location of the remaining defect, shape of the defect and the proximity to free margins an O-L flap was deemed most appropriate for complete closure of the defect.  Using a sterile surgical marker, an appropriate flap was drawn incorporating the defect and placing the expected incisions within the relaxed skin tension lines where possible.    The area thus outlined was incised deep to adipose tissue with a #15 scalpel blade.  The skin margins were undermined to an appropriate distance in all directions utilizing iris scissors.
Complex Repair And Bilobe Flap Text: The defect edges were debeveled with a #15 scalpel blade.  The primary defect was closed partially with a complex linear closure.  Given the location of the remaining defect, shape of the defect and the proximity to free margins a bilobe flap was deemed most appropriate for complete closure of the defect.  Using a sterile surgical marker, an appropriate advancement flap was drawn incorporating the defect and placing the expected incisions within the relaxed skin tension lines where possible.    The area thus outlined was incised deep to adipose tissue with a #15 scalpel blade.  The skin margins were undermined to an appropriate distance in all directions utilizing iris scissors.
Complex Repair And Melolabial Flap Text: The defect edges were debeveled with a #15 scalpel blade.  The primary defect was closed partially with a complex linear closure.  Given the location of the remaining defect, shape of the defect and the proximity to free margins a melolabial flap was deemed most appropriate for complete closure of the defect.  Using a sterile surgical marker, an appropriate advancement flap was drawn incorporating the defect and placing the expected incisions within the relaxed skin tension lines where possible.    The area thus outlined was incised deep to adipose tissue with a #15 scalpel blade.  The skin margins were undermined to an appropriate distance in all directions utilizing iris scissors.
Complex Repair And Rotation Flap Text: The defect edges were debeveled with a #15 scalpel blade.  The primary defect was closed partially with a complex linear closure.  Given the location of the remaining defect, shape of the defect and the proximity to free margins a rotation flap was deemed most appropriate for complete closure of the defect.  Using a sterile surgical marker, an appropriate advancement flap was drawn incorporating the defect and placing the expected incisions within the relaxed skin tension lines where possible.    The area thus outlined was incised deep to adipose tissue with a #15 scalpel blade.  The skin margins were undermined to an appropriate distance in all directions utilizing iris scissors.
Complex Repair And Rhombic Flap Text: The defect edges were debeveled with a #15 scalpel blade.  The primary defect was closed partially with a complex linear closure.  Given the location of the remaining defect, shape of the defect and the proximity to free margins a rhombic flap was deemed most appropriate for complete closure of the defect.  Using a sterile surgical marker, an appropriate advancement flap was drawn incorporating the defect and placing the expected incisions within the relaxed skin tension lines where possible.    The area thus outlined was incised deep to adipose tissue with a #15 scalpel blade.  The skin margins were undermined to an appropriate distance in all directions utilizing iris scissors.
Complex Repair And Transposition Flap Text: The defect edges were debeveled with a #15 scalpel blade.  The primary defect was closed partially with a complex linear closure.  Given the location of the remaining defect, shape of the defect and the proximity to free margins a transposition flap was deemed most appropriate for complete closure of the defect.  Using a sterile surgical marker, an appropriate advancement flap was drawn incorporating the defect and placing the expected incisions within the relaxed skin tension lines where possible.    The area thus outlined was incised deep to adipose tissue with a #15 scalpel blade.  The skin margins were undermined to an appropriate distance in all directions utilizing iris scissors.
Complex Repair And V-Y Plasty Text: The defect edges were debeveled with a #15 scalpel blade.  The primary defect was closed partially with a complex linear closure.  Given the location of the remaining defect, shape of the defect and the proximity to free margins a V-Y plasty was deemed most appropriate for complete closure of the defect.  Using a sterile surgical marker, an appropriate advancement flap was drawn incorporating the defect and placing the expected incisions within the relaxed skin tension lines where possible.    The area thus outlined was incised deep to adipose tissue with a #15 scalpel blade.  The skin margins were undermined to an appropriate distance in all directions utilizing iris scissors.
Complex Repair And M Plasty Text: The defect edges were debeveled with a #15 scalpel blade.  The primary defect was closed partially with a complex linear closure.  Given the location of the remaining defect, shape of the defect and the proximity to free margins an M plasty was deemed most appropriate for complete closure of the defect.  Using a sterile surgical marker, an appropriate advancement flap was drawn incorporating the defect and placing the expected incisions within the relaxed skin tension lines where possible.    The area thus outlined was incised deep to adipose tissue with a #15 scalpel blade.  The skin margins were undermined to an appropriate distance in all directions utilizing iris scissors.
Complex Repair And Double M Plasty Text: The defect edges were debeveled with a #15 scalpel blade.  The primary defect was closed partially with a complex linear closure.  Given the location of the remaining defect, shape of the defect and the proximity to free margins a double M plasty was deemed most appropriate for complete closure of the defect.  Using a sterile surgical marker, an appropriate advancement flap was drawn incorporating the defect and placing the expected incisions within the relaxed skin tension lines where possible.    The area thus outlined was incised deep to adipose tissue with a #15 scalpel blade.  The skin margins were undermined to an appropriate distance in all directions utilizing iris scissors.
Complex Repair And W Plasty Text: The defect edges were debeveled with a #15 scalpel blade.  The primary defect was closed partially with a complex linear closure.  Given the location of the remaining defect, shape of the defect and the proximity to free margins a W plasty was deemed most appropriate for complete closure of the defect.  Using a sterile surgical marker, an appropriate advancement flap was drawn incorporating the defect and placing the expected incisions within the relaxed skin tension lines where possible.    The area thus outlined was incised deep to adipose tissue with a #15 scalpel blade.  The skin margins were undermined to an appropriate distance in all directions utilizing iris scissors.
Complex Repair And Z Plasty Text: The defect edges were debeveled with a #15 scalpel blade.  The primary defect was closed partially with a complex linear closure.  Given the location of the remaining defect, shape of the defect and the proximity to free margins a Z plasty was deemed most appropriate for complete closure of the defect.  Using a sterile surgical marker, an appropriate advancement flap was drawn incorporating the defect and placing the expected incisions within the relaxed skin tension lines where possible.    The area thus outlined was incised deep to adipose tissue with a #15 scalpel blade.  The skin margins were undermined to an appropriate distance in all directions utilizing iris scissors.
Complex Repair And Dorsal Nasal Flap Text: The defect edges were debeveled with a #15 scalpel blade.  The primary defect was closed partially with a complex linear closure.  Given the location of the remaining defect, shape of the defect and the proximity to free margins a dorsal nasal flap was deemed most appropriate for complete closure of the defect.  Using a sterile surgical marker, an appropriate flap was drawn incorporating the defect and placing the expected incisions within the relaxed skin tension lines where possible.    The area thus outlined was incised deep to adipose tissue with a #15 scalpel blade.  The skin margins were undermined to an appropriate distance in all directions utilizing iris scissors.
Complex Repair And Ftsg Text: The defect edges were debeveled with a #15 scalpel blade.  The primary defect was closed partially with a complex linear closure.  Given the location of the defect, shape of the defect and the proximity to free margins a full thickness skin graft was deemed most appropriate to repair the remaining defect.  The graft was trimmed to fit the size of the remaining defect.  The graft was then placed in the primary defect, oriented appropriately, and sutured into place.
Complex Repair And Burow's Graft Text: The defect edges were debeveled with a #15 scalpel blade.  The primary defect was closed partially with a complex linear closure.  Given the location of the defect, shape of the defect, the proximity to free margins and the presence of a standing cone deformity a Burow's graft was deemed most appropriate to repair the remaining defect.  The graft was trimmed to fit the size of the remaining defect.  The graft was then placed in the primary defect, oriented appropriately, and sutured into place.
Complex Repair And Split-Thickness Skin Graft Text: The defect edges were debeveled with a #15 scalpel blade.  The primary defect was closed partially with a complex linear closure.  Given the location of the defect, shape of the defect and the proximity to free margins a split thickness skin graft was deemed most appropriate to repair the remaining defect.  The graft was trimmed to fit the size of the remaining defect.  The graft was then placed in the primary defect, oriented appropriately, and sutured into place.
Complex Repair And Epidermal Autograft Text: The defect edges were debeveled with a #15 scalpel blade.  The primary defect was closed partially with a complex linear closure.  Given the location of the defect, shape of the defect and the proximity to free margins an epidermal autograft was deemed most appropriate to repair the remaining defect.  The graft was trimmed to fit the size of the remaining defect.  The graft was then placed in the primary defect, oriented appropriately, and sutured into place.
Complex Repair And Dermal Autograft Text: The defect edges were debeveled with a #15 scalpel blade.  The primary defect was closed partially with a complex linear closure.  Given the location of the defect, shape of the defect and the proximity to free margins an dermal autograft was deemed most appropriate to repair the remaining defect.  The graft was trimmed to fit the size of the remaining defect.  The graft was then placed in the primary defect, oriented appropriately, and sutured into place.
Complex Repair And Tissue Cultured Epidermal Autograft Text: The defect edges were debeveled with a #15 scalpel blade.  The primary defect was closed partially with a complex linear closure.  Given the location of the defect, shape of the defect and the proximity to free margins an tissue cultured epidermal autograft was deemed most appropriate to repair the remaining defect.  The graft was trimmed to fit the size of the remaining defect.  The graft was then placed in the primary defect, oriented appropriately, and sutured into place.
Complex Repair And Xenograft Text: The defect edges were debeveled with a #15 scalpel blade.  The primary defect was closed partially with a complex linear closure.  Given the location of the defect, shape of the defect and the proximity to free margins a xenograft was deemed most appropriate to repair the remaining defect.  The graft was trimmed to fit the size of the remaining defect.  The graft was then placed in the primary defect, oriented appropriately, and sutured into place.
Complex Repair And Skin Substitute Graft Text: The defect edges were debeveled with a #15 scalpel blade.  The primary defect was closed partially with a complex linear closure.  Given the location of the remaining defect, shape of the defect and the proximity to free margins a skin substitute graft was deemed most appropriate to repair the remaining defect.  The graft was trimmed to fit the size of the remaining defect.  The graft was then placed in the primary defect, oriented appropriately, and sutured into place.
Path Notes (To The Dermatopathologist): Please check margins.
Consent was obtained from the patient. The risks and benefits to therapy were discussed in detail. Specifically, the risks of infection, scarring, bleeding, prolonged wound healing, incomplete removal, allergy to anesthesia, nerve injury and recurrence were addressed. Prior to the procedure, the treatment site was clearly identified and confirmed by the patient.
Post-Care Instructions: I reviewed with the patient in detail post-care instructions. Patient is not to engage in any heavy lifting, exercise, or swimming for the next 14 days. Should the patient develop any fevers, chills, bleeding, severe pain patient will contact the office immediately.
Home Suture Removal Text: Patient was provided a home suture removal kit and will remove their sutures at home.  If they have any questions or difficulties they will call the office.
Where Do You Want The Question To Include Opioid Counseling Located?: Case Summary Tab
Information: Selecting Yes will display possible errors in your note based on the variables you have selected. This validation is only offered as a suggestion for you. PLEASE NOTE THAT THE VALIDATION TEXT WILL BE REMOVED WHEN YOU FINALIZE YOUR NOTE. IF YOU WANT TO FAX A PRELIMINARY NOTE YOU WILL NEED TO TOGGLE THIS TO 'NO' IF YOU DO NOT WANT IT IN YOUR FAXED NOTE.

## 2024-07-30 NOTE — PROCEDURE: CURETTAGE AND DESTRUCTION
Detail Level: Detailed
Biopsy Photograph Reviewed: Yes
Number Of Curettages: 3
Size Of Lesion In Cm: 1.1
Size Of Lesion After Curettage: 2
Add Intralesional Injection: No
Concentration (Mg/Ml Or Millions Of Plaque Forming Units/Cc): 0.01
Total Volume (Ccs): 1
Anesthesia Type: 1% lidocaine with epinephrine
Cautery Type: electrodesiccation
What Was Performed First?: Curettage
Final Size Statement: The size of the lesion after curettage was
Additional Information: (Optional): The wound was cleaned, and a pressure dressing was applied.  The patient received detailed post-op instructions.
Consent was obtained from the patient. The risks, benefits and alternatives to therapy were discussed in detail. Specifically, the risks of infection, scarring, bleeding, prolonged wound healing, nerve injury, incomplete removal, allergy to anesthesia and recurrence were addressed. Alternatives to ED&C, such as: surgical removal and XRT were also discussed.  Prior to the procedure, the treatment site was clearly identified and confirmed by the patient.
Post-Care Instructions: I reviewed with the patient in detail post-care instructions. Patient is to keep the area dry for 48 hours, and not to engage in any swimming until the area is healed. Should the patient develop any fevers, chills, bleeding, severe pain patient will contact the office immediately.
Bill As A Line Item Or As Units: Line Item
Size Of Lesion In Cm: 1.5
Size Of Lesion After Curettage: 2.5

## 2024-07-30 NOTE — HPI: PROCEDURE (SKIN SURGERY)
[id number]: previous_biopsy_has_been_previously_biopsied
Has The Growth Been Previously Biopsied?: has been previously biopsied

## 2024-08-06 ENCOUNTER — APPOINTMENT (RX ONLY)
Dept: URBAN - METROPOLITAN AREA CLINIC 329 | Facility: CLINIC | Age: 84
Setting detail: DERMATOLOGY
End: 2024-08-06

## 2024-08-06 DIAGNOSIS — Z48.02 ENCOUNTER FOR REMOVAL OF SUTURES: ICD-10-CM

## 2024-08-06 PROCEDURE — ? PHOTO-DOCUMENTATION

## 2024-08-06 PROCEDURE — 99024 POSTOP FOLLOW-UP VISIT: CPT

## 2024-08-06 PROCEDURE — ? COUNSELING

## 2024-08-06 PROCEDURE — ? SUTURE REMOVAL (GLOBAL PERIOD)

## 2024-08-06 ASSESSMENT — LOCATION SIMPLE DESCRIPTION DERM: LOCATION SIMPLE: LEFT CHEEK

## 2024-08-06 ASSESSMENT — LOCATION DETAILED DESCRIPTION DERM: LOCATION DETAILED: LEFT MEDIAL MALAR CHEEK

## 2024-08-06 ASSESSMENT — LOCATION ZONE DERM: LOCATION ZONE: FACE

## 2024-08-06 NOTE — PROCEDURE: SUTURE REMOVAL (GLOBAL PERIOD)
Detail Level: Detailed
Add 35923 Cpt? (Important Note: In 2017 The Use Of 06876 Is Being Tracked By Cms To Determine Future Global Period Reimbursement For Global Periods): yes

## 2024-08-20 ENCOUNTER — OFFICE VISIT (OUTPATIENT)
Age: 84
End: 2024-08-20
Payer: MEDICARE

## 2024-08-20 VITALS
HEART RATE: 62 BPM | BODY MASS INDEX: 26.84 KG/M2 | SYSTOLIC BLOOD PRESSURE: 174 MMHG | DIASTOLIC BLOOD PRESSURE: 88 MMHG | WEIGHT: 167 LBS | HEIGHT: 66 IN

## 2024-08-20 DIAGNOSIS — I10 ESSENTIAL HYPERTENSION: Primary | ICD-10-CM

## 2024-08-20 DIAGNOSIS — E78.00 PURE HYPERCHOLESTEROLEMIA: ICD-10-CM

## 2024-08-20 DIAGNOSIS — I25.10 CORONARY ARTERY DISEASE INVOLVING NATIVE CORONARY ARTERY OF NATIVE HEART WITHOUT ANGINA PECTORIS: ICD-10-CM

## 2024-08-20 PROCEDURE — 3077F SYST BP >= 140 MM HG: CPT | Performed by: INTERNAL MEDICINE

## 2024-08-20 PROCEDURE — 99214 OFFICE O/P EST MOD 30 MIN: CPT | Performed by: INTERNAL MEDICINE

## 2024-08-20 PROCEDURE — 1123F ACP DISCUSS/DSCN MKR DOCD: CPT | Performed by: INTERNAL MEDICINE

## 2024-08-20 PROCEDURE — 3079F DIAST BP 80-89 MM HG: CPT | Performed by: INTERNAL MEDICINE

## 2024-08-20 RX ORDER — AMLODIPINE BESYLATE 5 MG/1
5 TABLET ORAL DAILY
Qty: 30 TABLET | Refills: 3 | Status: SHIPPED | OUTPATIENT
Start: 2024-08-20

## 2024-08-20 NOTE — PROGRESS NOTES
[facility] CARDIOLOGY  [address] [place] [address], [address]  [place] [address]  PHONE: [phone]      24    NAME:  Mahesh Mir  : 1940  MRN: [id number]       SUBJECTIVE:   Mahesh Mir is a 83 y.o. male seen for a follow up visit regarding the following:     Chief Complaint   Patient presents with    Coronary Artery Disease    Hypertension         HPI:    No cp or raymond. No orthopnea or pnd. No palpitations or syncope.  Increased exertional fatigue.    Past Medical History, Past Surgical History, Family history, Social History, and Medications were all reviewed with the patient today and updated as necessary.     Current Outpatient Medications   Medication Sig Dispense Refill    amLODIPine (NORVASC) 5 MG tablet Take 1 tablet by mouth daily 30 tablet 3    simvastatin (ZOCOR) 40 MG tablet Take 1 tablet by mouth nightly 90 tablet 3    lisinopril (PRINIVIL;ZESTRIL) 30 MG tablet Take 1 tablet by mouth daily      Multiple Vitamin (DAILY VITAMIN PO) Take by mouth      aspirin 81 MG chewable tablet Take 1 tablet by mouth       No current facility-administered medications for this visit.               Social History     Tobacco Use    Smoking status: Never    Smokeless tobacco: Never   Substance Use Topics    Alcohol use: No              PHYSICAL EXAM:   BP (!) 174/88   Pulse 62   Ht 1.676 m (5' 6\")   Wt 75.8 kg (167 lb)   BMI 26.95 kg/m²    Constitutional: Oriented to person, place, and time. Appears well-developed and well-nourished.   Head: Normocephalic and atraumatic.   Neck: Neck supple.   Cardiovascular: Normal rate and regular rhythm with no murmur -No JVP  Pulmonary/Chest: Breath sounds normal.   Abdominal: Soft.   Musculoskeletal: No edema.   Neurological: Alert and oriented to person, place, and time.   Skin: Skin is warm and dry.   Psychiatric: Normal mood and affect.   Vitals reviewed.      Wt Readings from Last 3 Encounters:   24 75.8 kg (167 lb)   24 75.8 kg (167

## 2024-11-20 ENCOUNTER — APPOINTMENT (RX ONLY)
Dept: URBAN - METROPOLITAN AREA CLINIC 329 | Facility: CLINIC | Age: 84
Setting detail: DERMATOLOGY
End: 2024-11-20

## 2024-11-20 DIAGNOSIS — D18.0 HEMANGIOMA: ICD-10-CM

## 2024-11-20 DIAGNOSIS — L82.1 OTHER SEBORRHEIC KERATOSIS: ICD-10-CM

## 2024-11-20 DIAGNOSIS — L81.4 OTHER MELANIN HYPERPIGMENTATION: ICD-10-CM

## 2024-11-20 DIAGNOSIS — Z12.83 ENCOUNTER FOR SCREENING FOR MALIGNANT NEOPLASM OF SKIN: ICD-10-CM

## 2024-11-20 DIAGNOSIS — Z85.828 PERSONAL HISTORY OF OTHER MALIGNANT NEOPLASM OF SKIN: ICD-10-CM

## 2024-11-20 DIAGNOSIS — D22 MELANOCYTIC NEVI: ICD-10-CM

## 2024-11-20 DIAGNOSIS — Z71.89 OTHER SPECIFIED COUNSELING: ICD-10-CM

## 2024-11-20 DIAGNOSIS — D485 NEOPLASM OF UNCERTAIN BEHAVIOR OF SKIN: ICD-10-CM

## 2024-11-20 DIAGNOSIS — L57.8 OTHER SKIN CHANGES DUE TO CHRONIC EXPOSURE TO NONIONIZING RADIATION: ICD-10-CM | Status: STABLE

## 2024-11-20 PROBLEM — D18.01 HEMANGIOMA OF SKIN AND SUBCUTANEOUS TISSUE: Status: ACTIVE | Noted: 2024-11-20

## 2024-11-20 PROBLEM — D22.71 MELANOCYTIC NEVI OF RIGHT LOWER LIMB, INCLUDING HIP: Status: ACTIVE | Noted: 2024-11-20

## 2024-11-20 PROBLEM — D22.5 MELANOCYTIC NEVI OF TRUNK: Status: ACTIVE | Noted: 2024-11-20

## 2024-11-20 PROBLEM — D22.61 MELANOCYTIC NEVI OF RIGHT UPPER LIMB, INCLUDING SHOULDER: Status: ACTIVE | Noted: 2024-11-20

## 2024-11-20 PROBLEM — D48.5 NEOPLASM OF UNCERTAIN BEHAVIOR OF SKIN: Status: ACTIVE | Noted: 2024-11-20

## 2024-11-20 PROCEDURE — ? COUNSELING

## 2024-11-20 PROCEDURE — ? ADDITIONAL NOTES

## 2024-11-20 PROCEDURE — ? BIOPSY BY SHAVE METHOD

## 2024-11-20 PROCEDURE — 99213 OFFICE O/P EST LOW 20 MIN: CPT | Mod: 25

## 2024-11-20 PROCEDURE — ? SUNSCREEN RECOMMENDATIONS

## 2024-11-20 PROCEDURE — ? FULL BODY SKIN EXAM

## 2024-11-20 PROCEDURE — 11102 TANGNTL BX SKIN SINGLE LES: CPT

## 2024-11-20 PROCEDURE — ? TREATMENT REGIMEN

## 2024-11-20 PROCEDURE — 11103 TANGNTL BX SKIN EA SEP/ADDL: CPT

## 2024-11-20 PROCEDURE — ? DERMATOSCOPIC EVALUATION

## 2024-11-20 ASSESSMENT — LOCATION DETAILED DESCRIPTION DERM
LOCATION DETAILED: LEFT SUPERIOR LATERAL MALAR CHEEK
LOCATION DETAILED: RIGHT DISTAL POSTERIOR THIGH
LOCATION DETAILED: MIDDLE STERNUM
LOCATION DETAILED: RIGHT INFERIOR MEDIAL UPPER BACK
LOCATION DETAILED: RIGHT ANTERIOR PROXIMAL UPPER ARM
LOCATION DETAILED: LEFT DISTAL PRETIBIAL REGION
LOCATION DETAILED: RIGHT PROXIMAL DORSAL FOREARM
LOCATION DETAILED: RIGHT POSTERIOR ANKLE
LOCATION DETAILED: RIGHT PROXIMAL POSTERIOR UPPER ARM
LOCATION DETAILED: RIGHT SUPERIOR MEDIAL UPPER BACK
LOCATION DETAILED: LEFT MEDIAL UPPER BACK
LOCATION DETAILED: LEFT CENTRAL BUCCAL CHEEK
LOCATION DETAILED: LEFT NASAL ALA
LOCATION DETAILED: EPIGASTRIC SKIN
LOCATION DETAILED: RIGHT ANTERIOR DISTAL THIGH
LOCATION DETAILED: LEFT SUPERIOR FOREHEAD
LOCATION DETAILED: RIGHT DISTAL PRETIBIAL REGION
LOCATION DETAILED: LEFT INFERIOR ANTERIOR NECK

## 2024-11-20 ASSESSMENT — LOCATION SIMPLE DESCRIPTION DERM
LOCATION SIMPLE: LEFT CHEEK
LOCATION SIMPLE: RIGHT POSTERIOR THIGH
LOCATION SIMPLE: LEFT FOREHEAD
LOCATION SIMPLE: RIGHT PRETIBIAL REGION
LOCATION SIMPLE: RIGHT UPPER ARM
LOCATION SIMPLE: LEFT ANTERIOR NECK
LOCATION SIMPLE: RIGHT ANKLE
LOCATION SIMPLE: RIGHT POSTERIOR UPPER ARM
LOCATION SIMPLE: LEFT PRETIBIAL REGION
LOCATION SIMPLE: CHEST
LOCATION SIMPLE: ABDOMEN
LOCATION SIMPLE: LEFT NOSE
LOCATION SIMPLE: RIGHT UPPER BACK
LOCATION SIMPLE: RIGHT FOREARM
LOCATION SIMPLE: LEFT UPPER BACK
LOCATION SIMPLE: RIGHT THIGH

## 2024-11-20 ASSESSMENT — LOCATION ZONE DERM
LOCATION ZONE: FACE
LOCATION ZONE: TRUNK
LOCATION ZONE: NOSE
LOCATION ZONE: ARM
LOCATION ZONE: LEG
LOCATION ZONE: NECK

## 2024-11-20 NOTE — PROCEDURE: BIOPSY BY SHAVE METHOD
Detail Level: Detailed
Depth Of Biopsy: dermis
Was A Bandage Applied: Yes
Size Of Lesion In Cm: 0.6
X Size Of Lesion In Cm: 0
Biopsy Type: H and E
Biopsy Method: Dermablade
Anesthesia Type: 1% lidocaine with epinephrine
Anesthesia Volume In Cc: 0.5
Hemostasis: Drysol
Wound Care: Petrolatum
Dressing: bandage
Destruction After The Procedure: No
Type Of Destruction Used: Curettage
Curettage Text: The wound bed was treated with curettage after the biopsy was performed.
Cryotherapy Text: The wound bed was treated with cryotherapy after the biopsy was performed.
Electrodesiccation Text: The wound bed was treated with electrodesiccation after the biopsy was performed.
Electrodesiccation And Curettage Text: The wound bed was treated with electrodesiccation and curettage after the biopsy was performed.
Silver Nitrate Text: The wound bed was treated with silver nitrate after the biopsy was performed.
Lab: 6
Lab Facility: [id number]
Consent was obtained and risks were reviewed including but not limited to scarring, infection, bleeding, scabbing, incomplete removal, nerve damage and allergy to anesthesia.
Post-Care Instructions: I reviewed with the patient in detail post-care instructions. Patient is to keep the biopsy site dry overnight, and then apply bacitracin twice daily until healed. Patient may apply hydrogen peroxide soaks to remove any crusting.
Notification Instructions: Patient will be notified of biopsy results. However, patient instructed to call the office if not contacted within 2 weeks.
Billing Type: Third-Party Bill
Information: Selecting Yes will display possible errors in your note based on the variables you have selected. This validation is only offered as a suggestion for you. PLEASE NOTE THAT THE VALIDATION TEXT WILL BE REMOVED WHEN YOU FINALIZE YOUR NOTE. IF YOU WANT TO FAX A PRELIMINARY NOTE YOU WILL NEED TO TOGGLE THIS TO 'NO' IF YOU DO NOT WANT IT IN YOUR FAXED NOTE.
Size Of Lesion In Cm: 0.7

## 2024-11-20 NOTE — HPI: SKIN LESION
What Type Of Note Output Would You Prefer (Optional)?: Bullet Format
How Severe Is Your Skin Lesion?: mild
Is This A New Presentation, Or A Follow-Up?: Skin Lesions
Additional History: Patient states that he has some lesions of concern, one of them is located on his forehead. He states that the lesion presents as bleeding and sometimes itchy. He has had it for several months.

## 2024-12-17 ENCOUNTER — APPOINTMENT (OUTPATIENT)
Dept: URBAN - METROPOLITAN AREA CLINIC 329 | Facility: CLINIC | Age: 84
Setting detail: DERMATOLOGY
End: 2024-12-17

## 2024-12-17 PROBLEM — C44.319 BASAL CELL CARCINOMA OF SKIN OF OTHER PARTS OF FACE: Status: ACTIVE | Noted: 2024-12-17

## 2024-12-17 PROBLEM — C44.311 BASAL CELL CARCINOMA OF SKIN OF NOSE: Status: ACTIVE | Noted: 2024-12-17

## 2024-12-17 PROCEDURE — ? COUNSELING

## 2024-12-17 PROCEDURE — ? EXCISION

## 2024-12-17 PROCEDURE — 12055 INTMD RPR FACE/MM 12.6-20 CM: CPT | Mod: 59

## 2024-12-17 PROCEDURE — 17281 DSTR MAL LS F/E/E/N/L/M .6-1: CPT

## 2024-12-17 PROCEDURE — 11642 EXC F/E/E/N/L MAL+MRG 1.1-2: CPT | Mod: 59,76

## 2024-12-17 PROCEDURE — ? CURETTAGE AND DESTRUCTION

## 2024-12-17 PROCEDURE — 11642 EXC F/E/E/N/L MAL+MRG 1.1-2: CPT | Mod: 59

## 2024-12-17 NOTE — PROCEDURE: EXCISION
Biopsy Photograph Reviewed: Yes
Accession #: [id number]
Size Of Lesion In Cm: 1.5
X Size Of Lesion In Cm (Optional): 0
Size Of Margin In Cm: 0.2
Was An Eye Clamp Used?: No
Eye Clamp Note Details: An eye clamp was used during the procedure.
Excision Method: Elliptical
Saucerization Depth: dermis and superficial adipose tissue
Repair Type: Intermediate
Intermediate / Complex Repair - Final Wound Length In Cm: 5
Suturegard Retention Suture: 2-0 Nylon
Retention Suture Bite Size: 3 mm
Length To Time In Minutes Device Was In Place: 10
Number Of Hemigard Strips Per Side: 1
Undermining Type: Entire Wound
Debridement Text: The wound edges were debrided prior to proceeding with the closure to facilitate wound healing.
Helical Rim Text: The closure involved the helical rim.
Vermilion Border Text: The closure involved the vermilion border.
Nostril Rim Text: The closure involved the nostril rim.
Retention Suture Text: Retention sutures were placed to support the closure and prevent dehiscence.
Suture Removal: 6 days
Lab: 6
Lab Facility: [id number]
Graft Donor Site Bandage (Optional-Leave Blank If You Don't Want In Note): Steri-strips and a pressure bandage were applied to the donor site.
Epidermal Closure Graft Donor Site (Optional): simple interrupted
Billing Type: Third-Party Bill
Excision Depth: adipose tissue
Scalpel Size: 15 blade
Anesthesia Type: 1% lidocaine with epinephrine
Hemostasis: Electrocautery
Estimated Blood Loss (Cc): minimal
Detail Level: Detailed
Repair Depth: use same depth as excision depth
Deep Sutures: 5-0 Vicryl
Epidermal Sutures: 5-0 Prolene
Epidermal Closure: running horizontal mattress
Wound Care: Petrolatum
Dressing: dry sterile dressing
Suturegard Intro: Intraoperative tissue expansion was performed, utilizing the SUTUREGARD device, in order to reduce wound tension.
Suturegard Body: The suture ends were repeatedly re-tightened and re-clamped to achieve the desired tissue expansion.
Hemigard Intro: Due to skin fragility and wound tension, it was decided to use HEMIGARD adhesive retention suture devices to permit a linear closure. The skin was cleaned and dried for a 6cm distance away from the wound. Excessive hair, if present, was removed to allow for adhesion.
Hemigard Postcare Instructions: The HEMIGARD strips are to remain completely dry for at least 5-7 days.
Positioning (Leave Blank If You Do Not Want): The patient was placed in a comfortable position exposing the surgical site.
Pre-Excision Curettage Text (Leave Blank If You Do Not Want): Prior to drawing the surgical margin the visible lesion was removed with electrodesiccation and curettage to clearly define the lesion size.
Complex Repair Preamble Text (Leave Blank If You Do Not Want): Extensive wide undermining was performed.
Intermediate Repair Preamble Text (Leave Blank If You Do Not Want): Undermining was performed with blunt dissection.
Curvilinear Excision Additional Text (Leave Blank If You Do Not Want): The margin was drawn around the clinically apparent lesion.  A curvilinear shape was then drawn on the skin incorporating the lesion and margins.  Incisions were then made along these lines to the appropriate tissue plane and the lesion was extirpated.
Fusiform Excision Additional Text (Leave Blank If You Do Not Want): The margin was drawn around the clinically apparent lesion.  A fusiform shape was then drawn on the skin incorporating the lesion and margins.  Incisions were then made along these lines to the appropriate tissue plane and the lesion was extirpated.
Elliptical Excision Additional Text (Leave Blank If You Do Not Want): The margin was drawn around the clinically apparent lesion.  An elliptical shape was then drawn on the skin incorporating the lesion and margins.  Incisions were then made along these lines to the appropriate tissue plane and the lesion was extirpated.
Saucerization Excision Additional Text (Leave Blank If You Do Not Want): The margin was drawn around the clinically apparent lesion.  Incisions were then made along these lines, in a tangential fashion, to the appropriate tissue plane and the lesion was extirpated.
Slit Excision Additional Text (Leave Blank If You Do Not Want): A linear line was drawn on the skin overlying the lesion. An incision was made slowly until the lesion was visualized.  Once visualized, the lesion was removed with blunt dissection.
Excisional Biopsy Additional Text (Leave Blank If You Do Not Want): The margin was drawn around the clinically apparent lesion. An elliptical shape was then drawn on the skin incorporating the lesion and margins.  Incisions were then made along these lines to the appropriate tissue plane and the lesion was extirpated.
Perilesional Excision Additional Text (Leave Blank If You Do Not Want): The margin was drawn around the clinically apparent lesion. Incisions were then made along these lines to the appropriate tissue plane and the lesion was extirpated.
Repair Performed By Another Provider Text (Leave Blank If You Do Not Want): After the tissue was excised the defect was repaired by another provider.
No Repair - Repaired With Adjacent Surgical Defect Text (Leave Blank If You Do Not Want): After the excision the defect was repaired concurrently with another surgical defect which was in close approximation.
Adjacent Tissue Transfer Text: The defect edges were debeveled with a #15 scalpel blade.  Given the location of the defect and the proximity to free margins an adjacent tissue transfer was deemed most appropriate.  Using a sterile surgical marker, an appropriate flap was drawn incorporating the defect and placing the expected incisions within the relaxed skin tension lines where possible.    The area thus outlined was incised deep to adipose tissue with a #15 scalpel blade.  The skin margins were undermined to an appropriate distance in all directions utilizing iris scissors.
Advancement Flap (Single) Text: The defect edges were debeveled with a #15 scalpel blade.  Given the location of the defect and the proximity to free margins a single advancement flap was deemed most appropriate.  Using a sterile surgical marker, an appropriate advancement flap was drawn incorporating the defect and placing the expected incisions within the relaxed skin tension lines where possible.    The area thus outlined was incised deep to adipose tissue with a #15 scalpel blade.  The skin margins were undermined to an appropriate distance in all directions utilizing iris scissors.
Advancement Flap (Double) Text: The defect edges were debeveled with a #15 scalpel blade.  Given the location of the defect and the proximity to free margins a double advancement flap was deemed most appropriate.  Using a sterile surgical marker, the appropriate advancement flaps were drawn incorporating the defect and placing the expected incisions within the relaxed skin tension lines where possible.    The area thus outlined was incised deep to adipose tissue with a #15 scalpel blade.  The skin margins were undermined to an appropriate distance in all directions utilizing iris scissors.
Burow's Advancement Flap Text: The defect edges were debeveled with a #15 scalpel blade.  Given the location of the defect and the proximity to free margins a Burow's advancement flap was deemed most appropriate.  Using a sterile surgical marker, the appropriate advancement flap was drawn incorporating the defect and placing the expected incisions within the relaxed skin tension lines where possible.    The area thus outlined was incised deep to adipose tissue with a #15 scalpel blade.  The skin margins were undermined to an appropriate distance in all directions utilizing iris scissors.
Chonodrocutaneous Helical Advancement Flap Text: The defect edges were debeveled with a #15 scalpel blade.  Given the location of the defect and the proximity to free margins a chondrocutaneous helical advancement flap was deemed most appropriate.  Using a sterile surgical marker, the appropriate advancement flap was drawn incorporating the defect and placing the expected incisions within the relaxed skin tension lines where possible.    The area thus outlined was incised deep to adipose tissue with a #15 scalpel blade.  The skin margins were undermined to an appropriate distance in all directions utilizing iris scissors.
Crescentic Advancement Flap Text: The defect edges were debeveled with a #15 scalpel blade.  Given the location of the defect and the proximity to free margins a crescentic advancement flap was deemed most appropriate.  Using a sterile surgical marker, the appropriate advancement flap was drawn incorporating the defect and placing the expected incisions within the relaxed skin tension lines where possible.    The area thus outlined was incised deep to adipose tissue with a #15 scalpel blade.  The skin margins were undermined to an appropriate distance in all directions utilizing iris scissors.
A-T Advancement Flap Text: The defect edges were debeveled with a #15 scalpel blade.  Given the location of the defect, shape of the defect and the proximity to free margins an A-T advancement flap was deemed most appropriate.  Using a sterile surgical marker, an appropriate advancement flap was drawn incorporating the defect and placing the expected incisions within the relaxed skin tension lines where possible.    The area thus outlined was incised deep to adipose tissue with a #15 scalpel blade.  The skin margins were undermined to an appropriate distance in all directions utilizing iris scissors.
O-T Advancement Flap Text: The defect edges were debeveled with a #15 scalpel blade.  Given the location of the defect, shape of the defect and the proximity to free margins an O-T advancement flap was deemed most appropriate.  Using a sterile surgical marker, an appropriate advancement flap was drawn incorporating the defect and placing the expected incisions within the relaxed skin tension lines where possible.    The area thus outlined was incised deep to adipose tissue with a #15 scalpel blade.  The skin margins were undermined to an appropriate distance in all directions utilizing iris scissors.
O-L Flap Text: The defect edges were debeveled with a #15 scalpel blade.  Given the location of the defect, shape of the defect and the proximity to free margins an O-L flap was deemed most appropriate.  Using a sterile surgical marker, an appropriate advancement flap was drawn incorporating the defect and placing the expected incisions within the relaxed skin tension lines where possible.    The area thus outlined was incised deep to adipose tissue with a #15 scalpel blade.  The skin margins were undermined to an appropriate distance in all directions utilizing iris scissors.
O-Z Flap Text: The defect edges were debeveled with a #15 scalpel blade.  Given the location of the defect, shape of the defect and the proximity to free margins an O-Z flap was deemed most appropriate.  Using a sterile surgical marker, an appropriate transposition flap was drawn incorporating the defect and placing the expected incisions within the relaxed skin tension lines where possible. The area thus outlined was incised deep to adipose tissue with a #15 scalpel blade.  The skin margins were undermined to an appropriate distance in all directions utilizing iris scissors.
Double O-Z Flap Text: The defect edges were debeveled with a #15 scalpel blade.  Given the location of the defect, shape of the defect and the proximity to free margins a Double O-Z flap was deemed most appropriate.  Using a sterile surgical marker, an appropriate transposition flap was drawn incorporating the defect and placing the expected incisions within the relaxed skin tension lines where possible. The area thus outlined was incised deep to adipose tissue with a #15 scalpel blade.  The skin margins were undermined to an appropriate distance in all directions utilizing iris scissors.
V-Y Flap Text: The defect edges were debeveled with a #15 scalpel blade.  Given the location of the defect, shape of the defect and the proximity to free margins a V-Y flap was deemed most appropriate.  Using a sterile surgical marker, an appropriate advancement flap was drawn incorporating the defect and placing the expected incisions within the relaxed skin tension lines where possible.    The area thus outlined was incised deep to adipose tissue with a #15 scalpel blade.  The skin margins were undermined to an appropriate distance in all directions utilizing iris scissors.
Advancement-Rotation Flap Text: The defect edges were debeveled with a #15 scalpel blade.  Given the location of the defect, shape of the defect and the proximity to free margins an advancement-rotation flap was deemed most appropriate.  Using a sterile surgical marker, an appropriate flap was drawn incorporating the defect and placing the expected incisions within the relaxed skin tension lines where possible. The area thus outlined was incised deep to adipose tissue with a #15 scalpel blade.  The skin margins were undermined to an appropriate distance in all directions utilizing iris scissors.
Mercedes Flap Text: The defect edges were debeveled with a #15 scalpel blade.  Given the location of the defect, shape of the defect and the proximity to free margins a Mercedes flap was deemed most appropriate.  Using a sterile surgical marker, an appropriate advancement flap was drawn incorporating the defect and placing the expected incisions within the relaxed skin tension lines where possible. The area thus outlined was incised deep to adipose tissue with a #15 scalpel blade.  The skin margins were undermined to an appropriate distance in all directions utilizing iris scissors.
Modified Advancement Flap Text: The defect edges were debeveled with a #15 scalpel blade.  Given the location of the defect, shape of the defect and the proximity to free margins a modified advancement flap was deemed most appropriate.  Using a sterile surgical marker, an appropriate advancement flap was drawn incorporating the defect and placing the expected incisions within the relaxed skin tension lines where possible.    The area thus outlined was incised deep to adipose tissue with a #15 scalpel blade.  The skin margins were undermined to an appropriate distance in all directions utilizing iris scissors.
Mucosal Advancement Flap Text: Given the location of the defect, shape of the defect and the proximity to free margins a mucosal advancement flap was deemed most appropriate. Incisions were made with a 15 blade scalpel in the appropriate fashion along the cutaneous vermilion border and the mucosal lip. The remaining actinically damaged mucosal tissue was excised.  The mucosal advancement flap was then elevated to the gingival sulcus with care taken to preserve the neurovascular structures and advanced into the primary defect. Care was taken to ensure that precise realignment of the vermilion border was achieved.
Peng Advancement Flap Text: The defect edges were debeveled with a #15 scalpel blade.  Given the location of the defect, shape of the defect and the proximity to free margins a Peng advancement flap was deemed most appropriate.  Using a sterile surgical marker, an appropriate advancement flap was drawn incorporating the defect and placing the expected incisions within the relaxed skin tension lines where possible. The area thus outlined was incised deep to adipose tissue with a #15 scalpel blade.  The skin margins were undermined to an appropriate distance in all directions utilizing iris scissors.
Hatchet Flap Text: The defect edges were debeveled with a #15 scalpel blade.  Given the location of the defect, shape of the defect and the proximity to free margins a hatchet flap was deemed most appropriate.  Using a sterile surgical marker, an appropriate hatchet flap was drawn incorporating the defect and placing the expected incisions within the relaxed skin tension lines where possible.    The area thus outlined was incised deep to adipose tissue with a #15 scalpel blade.  The skin margins were undermined to an appropriate distance in all directions utilizing iris scissors.
Rotation Flap Text: The defect edges were debeveled with a #15 scalpel blade.  Given the location of the defect, shape of the defect and the proximity to free margins a rotation flap was deemed most appropriate.  Using a sterile surgical marker, an appropriate rotation flap was drawn incorporating the defect and placing the expected incisions within the relaxed skin tension lines where possible.    The area thus outlined was incised deep to adipose tissue with a #15 scalpel blade.  The skin margins were undermined to an appropriate distance in all directions utilizing iris scissors.
Bilateral Rotation Flap Text: The defect edges were debeveled with a #15 scalpel blade. Given the location of the defect, shape of the defect and the proximity to free margins a bilateral rotation flap was deemed most appropriate. Using a sterile surgical marker, an appropriate rotation flap was drawn incorporating the defect and placing the expected incisions within the relaxed skin tension lines where possible. The area thus outlined was incised deep to adipose tissue with a #15 scalpel blade. The skin margins were undermined to an appropriate distance in all directions utilizing iris scissors. Following this, the designed flap was carried over into the primary defect and sutured into place.
Spiral Flap Text: The defect edges were debeveled with a #15 scalpel blade.  Given the location of the defect, shape of the defect and the proximity to free margins a spiral flap was deemed most appropriate.  Using a sterile surgical marker, an appropriate rotation flap was drawn incorporating the defect and placing the expected incisions within the relaxed skin tension lines where possible. The area thus outlined was incised deep to adipose tissue with a #15 scalpel blade.  The skin margins were undermined to an appropriate distance in all directions utilizing iris scissors.
Staged Advancement Flap Text: The defect edges were debeveled with a #15 scalpel blade.  Given the location of the defect, shape of the defect and the proximity to free margins a staged advancement flap was deemed most appropriate.  Using a sterile surgical marker, an appropriate advancement flap was drawn incorporating the defect and placing the expected incisions within the relaxed skin tension lines where possible. The area thus outlined was incised deep to adipose tissue with a #15 scalpel blade.  The skin margins were undermined to an appropriate distance in all directions utilizing iris scissors.
Star Wedge Flap Text: The defect edges were debeveled with a #15 scalpel blade.  Given the location of the defect, shape of the defect and the proximity to free margins a star wedge flap was deemed most appropriate.  Using a sterile surgical marker, an appropriate rotation flap was drawn incorporating the defect and placing the expected incisions within the relaxed skin tension lines where possible. The area thus outlined was incised deep to adipose tissue with a #15 scalpel blade.  The skin margins were undermined to an appropriate distance in all directions utilizing iris scissors.
Transposition Flap Text: The defect edges were debeveled with a #15 scalpel blade.  Given the location of the defect and the proximity to free margins a transposition flap was deemed most appropriate.  Using a sterile surgical marker, an appropriate transposition flap was drawn incorporating the defect.    The area thus outlined was incised deep to adipose tissue with a #15 scalpel blade.  The skin margins were undermined to an appropriate distance in all directions utilizing iris scissors.
Muscle Hinge Flap Text: The defect edges were debeveled with a #15 scalpel blade.  Given the size, depth and location of the defect and the proximity to free margins a muscle hinge flap was deemed most appropriate.  Using a sterile surgical marker, an appropriate hinge flap was drawn incorporating the defect. The area thus outlined was incised with a #15 scalpel blade.  The skin margins were undermined to an appropriate distance in all directions utilizing iris scissors.
Mustarde Flap Text: The defect edges were debeveled with a #15 scalpel blade.  Given the size, depth and location of the defect and the proximity to free margins a Mustarde flap was deemed most appropriate.  Using a sterile surgical marker, an appropriate flap was drawn incorporating the defect. The area thus outlined was incised with a #15 scalpel blade.  The skin margins were undermined to an appropriate distance in all directions utilizing iris scissors.
Nasal Turnover Hinge Flap Text: The defect edges were debeveled with a #15 scalpel blade.  Given the size, depth, location of the defect and the defect being full thickness a nasal turnover hinge flap was deemed most appropriate.  Using a sterile surgical marker, an appropriate hinge flap was drawn incorporating the defect. The area thus outlined was incised with a #15 scalpel blade. The flap was designed to recreate the nasal mucosal lining and the alar rim. The skin margins were undermined to an appropriate distance in all directions utilizing iris scissors.
Nasalis-Muscle-Based Myocutaneous Island Pedicle Flap Text: Using a #15 blade, an incision was made around the donor flap to the level of the nasalis muscle. Wide lateral undermining was then performed in both the subcutaneous plane above the nasalis muscle, and in a submuscular plane just above periosteum. This allowed the formation of a free nasalis muscle axial pedicle (based on the angular artery) which was still attached to the actual cutaneous flap, increasing its mobility and vascular viability. Hemostasis was obtained with pinpoint electrocoagulation. The flap was mobilized into position and the pivotal anchor points positioned and stabilized with buried interrupted sutures. Subcutaneous and dermal tissues were closed in a multilayered fashion with sutures. Tissue redundancies were excised, and the epidermal edges were apposed without significant tension and sutured with sutures.
Nasalis Myocutaneous Flap Text: Using a #15 blade, an incision was made around the donor flap to the level of the nasalis muscle. Wide lateral undermining was then performed in both the subcutaneous plane above the nasalis muscle, and in a submuscular plane just above periosteum. This allowed the formation of a free nasalis muscle axial pedicle which was still attached to the actual cutaneous flap, increasing its mobility and vascular viability. Hemostasis was obtained with pinpoint electrocoagulation. The flap was mobilized into position and the pivotal anchor points positioned and stabilized with buried interrupted sutures. Subcutaneous and dermal tissues were closed in a multilayered fashion with sutures. Tissue redundancies were excised, and the epidermal edges were apposed without significant tension and sutured with sutures.
Nasolabial Transposition Flap Text: The defect edges were debeveled with a #15 scalpel blade.  Given the size, depth and location of the defect and the proximity to free margins a nasolabial transposition flap was deemed most appropriate. Using a sterile surgical marker, an appropriate flap was drawn incorporating the defect. The area thus outlined was incised with a #15 scalpel blade. The skin margins were undermined to an appropriate distance in all directions utilizing iris scissors. Following this, the designed flap was carried into the primary defect and sutured into place.
Orbicularis Oris Muscle Flap Text: The defect edges were debeveled with a #15 scalpel blade.  Given that the defect affected the competency of the oral sphincter an orbicularis oris muscle flap was deemed most appropriate to restore this competency and normal muscle function.  Using a sterile surgical marker, an appropriate flap was drawn incorporating the defect. The area thus outlined was incised with a #15 scalpel blade.
Melolabial Transposition Flap Text: The defect edges were debeveled with a #15 scalpel blade.  Given the location of the defect and the proximity to free margins a melolabial flap was deemed most appropriate.  Using a sterile surgical marker, an appropriate melolabial transposition flap was drawn incorporating the defect.    The area thus outlined was incised deep to adipose tissue with a #15 scalpel blade.  The skin margins were undermined to an appropriate distance in all directions utilizing iris scissors.
Rectangular Flap Text: The defect edges were debeveled with a #15 scalpel blade. Given the location of the defect and the proximity to free margins a rectangular flap was deemed most appropriate. Using a sterile surgical marker, an appropriate rectangular flap was drawn incorporating the defect. The area thus outlined was incised deep to adipose tissue with a #15 scalpel blade. The skin margins were undermined to an appropriate distance in all directions utilizing iris scissors. Following this, the designed flap was carried over into the primary defect and sutured into place.
Rhombic Flap Text: The defect edges were debeveled with a #15 scalpel blade.  Given the location of the defect and the proximity to free margins a rhombic flap was deemed most appropriate.  Using a sterile surgical marker, an appropriate rhombic flap was drawn incorporating the defect.    The area thus outlined was incised deep to adipose tissue with a #15 scalpel blade.  The skin margins were undermined to an appropriate distance in all directions utilizing iris scissors.
Rhomboid Transposition Flap Text: The defect edges were debeveled with a #15 scalpel blade.  Given the location of the defect and the proximity to free margins a rhomboid transposition flap was deemed most appropriate.  Using a sterile surgical marker, an appropriate rhomboid flap was drawn incorporating the defect.    The area thus outlined was incised deep to adipose tissue with a #15 scalpel blade.  The skin margins were undermined to an appropriate distance in all directions utilizing iris scissors.
Bi-Rhombic Flap Text: The defect edges were debeveled with a #15 scalpel blade.  Given the location of the defect and the proximity to free margins a bi-rhombic flap was deemed most appropriate.  Using a sterile surgical marker, an appropriate rhombic flap was drawn incorporating the defect. The area thus outlined was incised deep to adipose tissue with a #15 scalpel blade.  The skin margins were undermined to an appropriate distance in all directions utilizing iris scissors.
Helical Rim Advancement Flap Text: The defect edges were debeveled with a #15 blade scalpel.  Given the location of the defect and the proximity to free margins (helical rim) a double helical rim advancement flap was deemed most appropriate.  Using a sterile surgical marker, the appropriate advancement flaps were drawn incorporating the defect and placing the expected incisions between the helical rim and antihelix where possible.  The area thus outlined was incised through and through with a #15 scalpel blade.  With a skin hook and iris scissors, the flaps were gently and sharply undermined and freed up.
Bilateral Helical Rim Advancement Flap Text: The defect edges were debeveled with a #15 blade scalpel.  Given the location of the defect and the proximity to free margins (helical rim) a bilateral helical rim advancement flap was deemed most appropriate.  Using a sterile surgical marker, the appropriate advancement flaps were drawn incorporating the defect and placing the expected incisions between the helical rim and antihelix where possible.  The area thus outlined was incised through and through with a #15 scalpel blade.  With a skin hook and iris scissors, the flaps were gently and sharply undermined and freed up.
Ear Star Wedge Flap Text: The defect edges were debeveled with a #15 blade scalpel.  Given the location of the defect and the proximity to free margins (helical rim) an ear star wedge flap was deemed most appropriate.  Using a sterile surgical marker, the appropriate flap was drawn incorporating the defect and placing the expected incisions between the helical rim and antihelix where possible.  The area thus outlined was incised through and through with a #15 scalpel blade.
Flip-Flop Flap Text: The defect edges were debeveled with a #15 blade scalpel.  Given the location of the defect and the proximity to free margins a flip-flop flap was deemed most appropriate. Using a sterile surgical marker, the appropriate flap was drawn incorporating the defect and placing the expected incisions between the helical rim and antihelix where possible.  The area thus outlined was incised through and through with a #15 scalpel blade. Following this, the designed flap was carried over into the primary defect and sutured into place.
Banner Transposition Flap Text: The defect edges were debeveled with a #15 scalpel blade.  Given the location of the defect and the proximity to free margins a Banner transposition flap was deemed most appropriate.  Using a sterile surgical marker, an appropriate flap drawn around the defect. The area thus outlined was incised deep to adipose tissue with a #15 scalpel blade.  The skin margins were undermined to an appropriate distance in all directions utilizing iris scissors.
Bilobed Flap Text: The defect edges were debeveled with a #15 scalpel blade.  Given the location of the defect and the proximity to free margins a bilobe flap was deemed most appropriate.  Using a sterile surgical marker, an appropriate bilobe flap drawn around the defect.    The area thus outlined was incised deep to adipose tissue with a #15 scalpel blade.  The skin margins were undermined to an appropriate distance in all directions utilizing iris scissors.
Bilobed Transposition Flap Text: The defect edges were debeveled with a #15 scalpel blade.  Given the location of the defect and the proximity to free margins a bilobed transposition flap was deemed most appropriate.  Using a sterile surgical marker, an appropriate bilobe flap drawn around the defect.    The area thus outlined was incised deep to adipose tissue with a #15 scalpel blade.  The skin margins were undermined to an appropriate distance in all directions utilizing iris scissors.
Trilobed Flap Text: The defect edges were debeveled with a #15 scalpel blade.  Given the location of the defect and the proximity to free margins a trilobed flap was deemed most appropriate.  Using a sterile surgical marker, an appropriate trilobed flap drawn around the defect.    The area thus outlined was incised deep to adipose tissue with a #15 scalpel blade.  The skin margins were undermined to an appropriate distance in all directions utilizing iris scissors.
Dorsal Nasal Flap Text: The defect edges were debeveled with a #15 scalpel blade.  Given the location of the defect and the proximity to free margins a dorsal nasal flap was deemed most appropriate.  Using a sterile surgical marker, an appropriate dorsal nasal flap was drawn around the defect.    The area thus outlined was incised deep to adipose tissue with a #15 scalpel blade.  The skin margins were undermined to an appropriate distance in all directions utilizing iris scissors.
Island Pedicle Flap Text: The defect edges were debeveled with a #15 scalpel blade.  Given the location of the defect, shape of the defect and the proximity to free margins an island pedicle advancement flap was deemed most appropriate.  Using a sterile surgical marker, an appropriate advancement flap was drawn incorporating the defect, outlining the appropriate donor tissue and placing the expected incisions within the relaxed skin tension lines where possible.    The area thus outlined was incised deep to adipose tissue with a #15 scalpel blade.  The skin margins were undermined to an appropriate distance in all directions around the primary defect and laterally outward around the island pedicle utilizing iris scissors.  There was minimal undermining beneath the pedicle flap.
Island Pedicle Flap With Canthal Suspension Text: The defect edges were debeveled with a #15 scalpel blade.  Given the location of the defect, shape of the defect and the proximity to free margins an island pedicle advancement flap was deemed most appropriate.  Using a sterile surgical marker, an appropriate advancement flap was drawn incorporating the defect, outlining the appropriate donor tissue and placing the expected incisions within the relaxed skin tension lines where possible. The area thus outlined was incised deep to adipose tissue with a #15 scalpel blade.  The skin margins were undermined to an appropriate distance in all directions around the primary defect and laterally outward around the island pedicle utilizing iris scissors.  There was minimal undermining beneath the pedicle flap. A suspension suture was placed in the canthal tendon to prevent tension and prevent ectropion.
Alar Island Pedicle Flap Text: The defect edges were debeveled with a #15 scalpel blade.  Given the location of the defect, shape of the defect and the proximity to the alar rim an island pedicle advancement flap was deemed most appropriate.  Using a sterile surgical marker, an appropriate advancement flap was drawn incorporating the defect, outlining the appropriate donor tissue and placing the expected incisions within the nasal ala running parallel to the alar rim. The area thus outlined was incised with a #15 scalpel blade.  The skin margins were undermined minimally to an appropriate distance in all directions around the primary defect and laterally outward around the island pedicle utilizing iris scissors.  There was minimal undermining beneath the pedicle flap.
Double Island Pedicle Flap Text: The defect edges were debeveled with a #15 scalpel blade.  Given the location of the defect, shape of the defect and the proximity to free margins a double island pedicle advancement flap was deemed most appropriate.  Using a sterile surgical marker, an appropriate advancement flap was drawn incorporating the defect, outlining the appropriate donor tissue and placing the expected incisions within the relaxed skin tension lines where possible.    The area thus outlined was incised deep to adipose tissue with a #15 scalpel blade.  The skin margins were undermined to an appropriate distance in all directions around the primary defect and laterally outward around the island pedicle utilizing iris scissors.  There was minimal undermining beneath the pedicle flap.
Island Pedicle Flap-Requiring Vessel Identification Text: The defect edges were debeveled with a #15 scalpel blade.  Given the location of the defect, shape of the defect and the proximity to free margins an island pedicle advancement flap was deemed most appropriate.  Using a sterile surgical marker, an appropriate advancement flap was drawn, based on the axial vessel mentioned above, incorporating the defect, outlining the appropriate donor tissue and placing the expected incisions within the relaxed skin tension lines where possible.    The area thus outlined was incised deep to adipose tissue with a #15 scalpel blade.  The skin margins were undermined to an appropriate distance in all directions around the primary defect and laterally outward around the island pedicle utilizing iris scissors.  There was minimal undermining beneath the pedicle flap.
Keystone Flap Text: The defect edges were debeveled with a #15 scalpel blade.  Given the location of the defect, shape of the defect a keystone flap was deemed most appropriate.  Using a sterile surgical marker, an appropriate keystone flap was drawn incorporating the defect, outlining the appropriate donor tissue and placing the expected incisions within the relaxed skin tension lines where possible. The area thus outlined was incised deep to adipose tissue with a #15 scalpel blade.  The skin margins were undermined to an appropriate distance in all directions around the primary defect and laterally outward around the flap utilizing iris scissors.
O-T Plasty Text: The defect edges were debeveled with a #15 scalpel blade.  Given the location of the defect, shape of the defect and the proximity to free margins an O-T plasty was deemed most appropriate.  Using a sterile surgical marker, an appropriate O-T plasty was drawn incorporating the defect and placing the expected incisions within the relaxed skin tension lines where possible.    The area thus outlined was incised deep to adipose tissue with a #15 scalpel blade.  The skin margins were undermined to an appropriate distance in all directions utilizing iris scissors.
O-Z Plasty Text: The defect edges were debeveled with a #15 scalpel blade.  Given the location of the defect, shape of the defect and the proximity to free margins an O-Z plasty (double transposition flap) was deemed most appropriate.  Using a sterile surgical marker, the appropriate transposition flaps were drawn incorporating the defect and placing the expected incisions within the relaxed skin tension lines where possible.    The area thus outlined was incised deep to adipose tissue with a #15 scalpel blade.  The skin margins were undermined to an appropriate distance in all directions utilizing iris scissors.  Hemostasis was achieved with electrocautery.  The flaps were then transposed into place, one clockwise and the other counterclockwise, and anchored with interrupted buried subcutaneous sutures.
Double O-Z Plasty Text: The defect edges were debeveled with a #15 scalpel blade.  Given the location of the defect, shape of the defect and the proximity to free margins a Double O-Z plasty (double transposition flap) was deemed most appropriate.  Using a sterile surgical marker, the appropriate transposition flaps were drawn incorporating the defect and placing the expected incisions within the relaxed skin tension lines where possible. The area thus outlined was incised deep to adipose tissue with a #15 scalpel blade.  The skin margins were undermined to an appropriate distance in all directions utilizing iris scissors.  Hemostasis was achieved with electrocautery.  The flaps were then transposed into place, one clockwise and the other counterclockwise, and anchored with interrupted buried subcutaneous sutures.
V-Y Plasty Text: The defect edges were debeveled with a #15 scalpel blade.  Given the location of the defect, shape of the defect and the proximity to free margins an V-Y advancement flap was deemed most appropriate.  Using a sterile surgical marker, an appropriate advancement flap was drawn incorporating the defect and placing the expected incisions within the relaxed skin tension lines where possible.    The area thus outlined was incised deep to adipose tissue with a #15 scalpel blade.  The skin margins were undermined to an appropriate distance in all directions utilizing iris scissors.
H Plasty Text: Given the location of the defect, shape of the defect and the proximity to free margins a H-plasty was deemed most appropriate for repair.  Using a sterile surgical marker, the appropriate advancement arms of the H-plasty were drawn incorporating the defect and placing the expected incisions within the relaxed skin tension lines where possible. The area thus outlined was incised deep to adipose tissue with a #15 scalpel blade. The skin margins were undermined to an appropriate distance in all directions utilizing iris scissors.  The opposing advancement arms were then advanced into place in opposite direction and anchored with interrupted buried subcutaneous sutures.
W Plasty Text: The lesion was extirpated to the level of the fat with a #15 scalpel blade.  Given the location of the defect, shape of the defect and the proximity to free margins a W-plasty was deemed most appropriate for repair.  Using a sterile surgical marker, the appropriate transposition arms of the W-plasty were drawn incorporating the defect and placing the expected incisions within the relaxed skin tension lines where possible.    The area thus outlined was incised deep to adipose tissue with a #15 scalpel blade.  The skin margins were undermined to an appropriate distance in all directions utilizing iris scissors.  The opposing transposition arms were then transposed into place in opposite direction and anchored with interrupted buried subcutaneous sutures.
Z Plasty Text: The lesion was extirpated to the level of the fat with a #15 scalpel blade.  Given the location of the defect, shape of the defect and the proximity to free margins a Z-plasty was deemed most appropriate for repair.  Using a sterile surgical marker, the appropriate transposition arms of the Z-plasty were drawn incorporating the defect and placing the expected incisions within the relaxed skin tension lines where possible.    The area thus outlined was incised deep to adipose tissue with a #15 scalpel blade.  The skin margins were undermined to an appropriate distance in all directions utilizing iris scissors.  The opposing transposition arms were then transposed into place in opposite direction and anchored with interrupted buried subcutaneous sutures.
Double Z Plasty Text: The lesion was extirpated to the level of the fat with a #15 scalpel blade. Given the location of the defect, shape of the defect and the proximity to free margins a double Z-plasty was deemed most appropriate for repair. Using a sterile surgical marker, the appropriate transposition arms of the double Z-plasty were drawn incorporating the defect and placing the expected incisions within the relaxed skin tension lines where possible. The area thus outlined was incised deep to adipose tissue with a #15 scalpel blade. The skin margins were undermined to an appropriate distance in all directions utilizing iris scissors. The opposing transposition arms were then transposed and carried over into place in opposite direction and anchored with interrupted buried subcutaneous sutures.
Zygomaticofacial Flap Text: Given the location of the defect, shape of the defect and the proximity to free margins a zygomaticofacial flap was deemed most appropriate for repair.  Using a sterile surgical marker, the appropriate flap was drawn incorporating the defect and placing the expected incisions within the relaxed skin tension lines where possible. The area thus outlined was incised deep to adipose tissue with a #15 scalpel blade with preservation of a vascular pedicle.  The skin margins were undermined to an appropriate distance in all directions utilizing iris scissors.  The flap was then placed into the defect and anchored with interrupted buried subcutaneous sutures.
Cheek Interpolation Flap Text: A decision was made to reconstruct the defect utilizing an interpolation axial flap and a staged reconstruction.  A telfa template was made of the defect.  This telfa template was then used to outline the Cheek Interpolation flap.  The donor area for the pedicle flap was then injected with anesthesia.  The flap was excised through the skin and subcutaneous tissue down to the layer of the underlying musculature.  The interpolation flap was carefully excised within this deep plane to maintain its blood supply.  The edges of the donor site were undermined.   The donor site was closed in a primary fashion.  The pedicle was then rotated into position and sutured.  Once the tube was sutured into place, adequate blood supply was confirmed with blanching and refill.  The pedicle was then wrapped with xeroform gauze and dressed appropriately with a telfa and gauze bandage to ensure continued blood supply and protect the attached pedicle.
Cheek-To-Nose Interpolation Flap Text: A decision was made to reconstruct the defect utilizing an interpolation axial flap and a staged reconstruction.  A telfa template was made of the defect.  This telfa template was then used to outline the Cheek-To-Nose Interpolation flap.  The donor area for the pedicle flap was then injected with anesthesia.  The flap was excised through the skin and subcutaneous tissue down to the layer of the underlying musculature.  The interpolation flap was carefully excised within this deep plane to maintain its blood supply.  The edges of the donor site were undermined.   The donor site was closed in a primary fashion.  The pedicle was then rotated into position and sutured.  Once the tube was sutured into place, adequate blood supply was confirmed with blanching and refill.  The pedicle was then wrapped with xeroform gauze and dressed appropriately with a telfa and gauze bandage to ensure continued blood supply and protect the attached pedicle.
Interpolation Flap Text: A decision was made to reconstruct the defect utilizing an interpolation axial flap and a staged reconstruction.  A telfa template was made of the defect.  This telfa template was then used to outline the interpolation flap.  The donor area for the pedicle flap was then injected with anesthesia.  The flap was excised through the skin and subcutaneous tissue down to the layer of the underlying musculature.  The interpolation flap was carefully excised within this deep plane to maintain its blood supply.  The edges of the donor site were undermined.   The donor site was closed in a primary fashion.  The pedicle was then rotated into position and sutured.  Once the tube was sutured into place, adequate blood supply was confirmed with blanching and refill.  The pedicle was then wrapped with xeroform gauze and dressed appropriately with a telfa and gauze bandage to ensure continued blood supply and protect the attached pedicle.
Melolabial Interpolation Flap Text: A decision was made to reconstruct the defect utilizing an interpolation axial flap and a staged reconstruction.  A telfa template was made of the defect.  This telfa template was then used to outline the melolabial interpolation flap.  The donor area for the pedicle flap was then injected with anesthesia.  The flap was excised through the skin and subcutaneous tissue down to the layer of the underlying musculature.  The pedicle flap was carefully excised within this deep plane to maintain its blood supply.  The edges of the donor site were undermined.   The donor site was closed in a primary fashion.  The pedicle was then rotated into position and sutured.  Once the tube was sutured into place, adequate blood supply was confirmed with blanching and refill.  The pedicle was then wrapped with xeroform gauze and dressed appropriately with a telfa and gauze bandage to ensure continued blood supply and protect the attached pedicle.
Mastoid Interpolation Flap Text: A decision was made to reconstruct the defect utilizing an interpolation axial flap and a staged reconstruction.  A telfa template was made of the defect.  This telfa template was then used to outline the mastoid interpolation flap.  The donor area for the pedicle flap was then injected with anesthesia.  The flap was excised through the skin and subcutaneous tissue down to the layer of the underlying musculature.  The pedicle flap was carefully excised within this deep plane to maintain its blood supply.  The edges of the donor site were undermined.   The donor site was closed in a primary fashion.  The pedicle was then rotated into position and sutured.  Once the tube was sutured into place, adequate blood supply was confirmed with blanching and refill.  The pedicle was then wrapped with xeroform gauze and dressed appropriately with a telfa and gauze bandage to ensure continued blood supply and protect the attached pedicle.
Posterior Auricular Interpolation Flap Text: A decision was made to reconstruct the defect utilizing an interpolation axial flap and a staged reconstruction.  A telfa template was made of the defect.  This telfa template was then used to outline the posterior auricular interpolation flap.  The donor area for the pedicle flap was then injected with anesthesia.  The flap was excised through the skin and subcutaneous tissue down to the layer of the underlying musculature.  The pedicle flap was carefully excised within this deep plane to maintain its blood supply.  The edges of the donor site were undermined.   The donor site was closed in a primary fashion.  The pedicle was then rotated into position and sutured.  Once the tube was sutured into place, adequate blood supply was confirmed with blanching and refill.  The pedicle was then wrapped with xeroform gauze and dressed appropriately with a telfa and gauze bandage to ensure continued blood supply and protect the attached pedicle.
Paramedian Forehead Flap Text: A decision was made to reconstruct the defect utilizing an interpolation axial flap and a staged reconstruction.  A telfa template was made of the defect.  This telfa template was then used to outline the paramedian forehead pedicle flap.  The donor area for the pedicle flap was then injected with anesthesia.  The flap was excised through the skin and subcutaneous tissue down to the layer of the underlying musculature.  The pedicle flap was carefully excised within this deep plane to maintain its blood supply.  The edges of the donor site were undermined.   The donor site was closed in a primary fashion.  The pedicle was then rotated into position and sutured.  Once the tube was sutured into place, adequate blood supply was confirmed with blanching and refill.  The pedicle was then wrapped with xeroform gauze and dressed appropriately with a telfa and gauze bandage to ensure continued blood supply and protect the attached pedicle.
Abbe Flap (Upper To Lower Lip) Text: The defect of the lower lip was assessed and measured.  Given the location and size of the defect, an Abbe flap was deemed most appropriate. Using a sterile surgical marker, an appropriate Abbe flap was measured and drawn on the upper lip. Local anesthesia was then infiltrated.  A scalpel was then used to incise the upper lip through and through the skin, vermilion, muscle and mucosa, leaving the flap pedicled on the opposite side.  The flap was then rotated and transferred to the lower lip defect.  The flap was then sutured into place with a three layer technique, closing the orbicularis oris muscle layer with subcutaneous buried sutures, followed by a mucosal layer and an epidermal layer.
Abbe Flap (Lower To Upper Lip) Text: The defect of the upper lip was assessed and measured.  Given the location and size of the defect, an Abbe flap was deemed most appropriate. Using a sterile surgical marker, an appropriate Abbe flap was measured and drawn on the lower lip. Local anesthesia was then infiltrated. A scalpel was then used to incise the upper lip through and through the skin, vermilion, muscle and mucosa, leaving the flap pedicled on the opposite side.  The flap was then rotated and transferred to the lower lip defect.  The flap was then sutured into place with a three layer technique, closing the orbicularis oris muscle layer with subcutaneous buried sutures, followed by a mucosal layer and an epidermal layer.
Estlander Flap (Upper To Lower Lip) Text: The defect of the lower lip was assessed and measured.  Given the location and size of the defect, an Estlander flap was deemed most appropriate. Using a sterile surgical marker, an appropriate Estlander flap was measured and drawn on the upper lip. Local anesthesia was then infiltrated. A scalpel was then used to incise the lateral aspect of the flap, through skin, muscle and mucosa, leaving the flap pedicled medially.  The flap was then rotated and positioned to fill the lower lip defect.  The flap was then sutured into place with a three layer technique, closing the orbicularis oris muscle layer with subcutaneous buried sutures, followed by a mucosal layer and an epidermal layer.
Lip Wedge Excision Repair Text: Given the location of the defect and the proximity to free margins a full thickness wedge repair was deemed most appropriate.  Using a sterile surgical marker, the appropriate repair was drawn incorporating the defect and placing the expected incisions perpendicular to the vermilion border.  The vermilion border was also meticulously outlined to ensure appropriate reapproximation during the repair.  The area thus outlined was incised through and through with a #15 scalpel blade.  The muscularis and dermis were reaproximated with deep sutures following hemostasis. Care was taken to realign the vermilion border before proceeding with the superficial closure.  Once the vermilion was realigned the superfical and mucosal closure was finished.
Ftsg Text: The defect edges were debeveled with a #15 scalpel blade.  Given the location of the defect, shape of the defect and the proximity to free margins a full thickness skin graft was deemed most appropriate.  Using a sterile surgical marker, the primary defect shape was transferred to the donor site. The area thus outlined was incised deep to adipose tissue with a #15 scalpel blade.  The harvested graft was then trimmed of adipose tissue until only dermis and epidermis was left.  The skin margins of the secondary defect were undermined to an appropriate distance in all directions utilizing iris scissors.  The secondary defect was closed with interrupted buried subcutaneous sutures.  The skin edges were then re-apposed with running  sutures.  The skin graft was then placed in the primary defect and oriented appropriately.
Split-Thickness Skin Graft Text: The defect edges were debeveled with a #15 scalpel blade.  Given the location of the defect, shape of the defect and the proximity to free margins a split thickness skin graft was deemed most appropriate.  Using a sterile surgical marker, the primary defect shape was transferred to the donor site. The split thickness graft was then harvested.  The skin graft was then placed in the primary defect and oriented appropriately.
Pinch Graft Text: The defect edges were debeveled with a #15 scalpel blade. Given the location of the defect, shape of the defect and the proximity to free margins a pinch graft was deemed most appropriate. Using a sterile surgical marker, the primary defect shape was transferred to the donor site. The area thus outlined was incised deep to adipose tissue with a #15 scalpel blade.  The harvested graft was then trimmed of adipose tissue until only dermis and epidermis was left. The skin graft was then placed in the primary defect and oriented appropriately.
Burow's Graft Text: The defect edges were debeveled with a #15 scalpel blade.  Given the location of the defect, shape of the defect, the proximity to free margins and the presence of a standing cone deformity a Burow's skin graft was deemed most appropriate. The standing cone was removed and this tissue was then trimmed to the shape of the primary defect. The adipose tissue was also removed until only dermis and epidermis were left.  The skin margins of the secondary defect were undermined to an appropriate distance in all directions utilizing iris scissors.  The secondary defect was closed with interrupted buried subcutaneous sutures.  The skin edges were then re-apposed with running  sutures.  The skin graft was then placed in the primary defect and oriented appropriately.
Cartilage Graft Text: The defect edges were debeveled with a #15 scalpel blade.  Given the location of the defect, shape of the defect, the fact the defect involved a full thickness cartilage defect a cartilage graft was deemed most appropriate.  An appropriate donor site was identified, cleansed, and anesthetized. The cartilage graft was then harvested and transferred to the recipient site, oriented appropriately and then sutured into place.  The secondary defect was then repaired using a primary closure.
Composite Graft Text: The defect edges were debeveled with a #15 scalpel blade.  Given the location of the defect, shape of the defect, the proximity to free margins and the fact the defect was full thickness a composite graft was deemed most appropriate.  The defect was outline and then transferred to the donor site.  A full thickness graft was then excised from the donor site. The graft was then placed in the primary defect, oriented appropriately and then sutured into place.  The secondary defect was then repaired using a primary closure.
Epidermal Autograft Text: The defect edges were debeveled with a #15 scalpel blade.  Given the location of the defect, shape of the defect and the proximity to free margins an epidermal autograft was deemed most appropriate.  Using a sterile surgical marker, the primary defect shape was transferred to the donor site. The epidermal graft was then harvested.  The skin graft was then placed in the primary defect and oriented appropriately.
Dermal Autograft Text: The defect edges were debeveled with a #15 scalpel blade.  Given the location of the defect, shape of the defect and the proximity to free margins a dermal autograft was deemed most appropriate.  Using a sterile surgical marker, the primary defect shape was transferred to the donor site. The area thus outlined was incised deep to adipose tissue with a #15 scalpel blade.  The harvested graft was then trimmed of adipose and epidermal tissue until only dermis was left.  The skin graft was then placed in the primary defect and oriented appropriately.
Skin Substitute Text: The defect edges were debeveled with a #15 scalpel blade.  Given the location of the defect, shape of the defect and the proximity to free margins a skin substitute graft was deemed most appropriate.  The graft material was trimmed to fit the size of the defect. The graft was then placed in the primary defect and oriented appropriately.
Tissue Cultured Epidermal Autograft Text: The defect edges were debeveled with a #15 scalpel blade.  Given the location of the defect, shape of the defect and the proximity to free margins a tissue cultured epidermal autograft was deemed most appropriate.  The graft was then trimmed to fit the size of the defect.  The graft was then placed in the primary defect and oriented appropriately.
Xenograft Text: The defect edges were debeveled with a #15 scalpel blade.  Given the location of the defect, shape of the defect and the proximity to free margins a xenograft was deemed most appropriate.  The graft was then trimmed to fit the size of the defect.  The graft was then placed in the primary defect and oriented appropriately.
Purse String (Intermediate) Text: Given the location of the defect and the characteristics of the surrounding skin a purse string intermediate closure was deemed most appropriate.  Undermining was performed circumfirentially around the surgical defect.  A purse string suture was then placed and tightened.
Purse String (Simple) Text: Given the location of the defect and the characteristics of the surrounding skin a purse string simple closure was deemed most appropriate.  Undermining was performed circumferentially around the surgical defect.  A purse string suture was then placed and tightened.
Partial Purse String (Intermediate) Text: Given the location of the defect and the characteristics of the surrounding skin an intermediate purse string closure was deemed most appropriate.  Undermining was performed circumferentially around the surgical defect.  A purse string suture was then placed and tightened. Wound tension of the circular defect prevented complete closure of the wound.
Partial Purse String (Simple) Text: Given the location of the defect and the characteristics of the surrounding skin a simple purse string closure was deemed most appropriate.  Undermining was performed circumferentially around the surgical defect.  A purse string suture was then placed and tightened. Wound tension of the circular defect prevented complete closure of the wound.
Complex Repair And Single Advancement Flap Text: The defect edges were debeveled with a #15 scalpel blade.  The primary defect was closed partially with a complex linear closure.  Given the location of the remaining defect, shape of the defect and the proximity to free margins a single advancement flap was deemed most appropriate for complete closure of the defect.  Using a sterile surgical marker, an appropriate advancement flap was drawn incorporating the defect and placing the expected incisions within the relaxed skin tension lines where possible.    The area thus outlined was incised deep to adipose tissue with a #15 scalpel blade.  The skin margins were undermined to an appropriate distance in all directions utilizing iris scissors.
Complex Repair And Double Advancement Flap Text: The defect edges were debeveled with a #15 scalpel blade.  The primary defect was closed partially with a complex linear closure.  Given the location of the remaining defect, shape of the defect and the proximity to free margins a double advancement flap was deemed most appropriate for complete closure of the defect.  Using a sterile surgical marker, an appropriate advancement flap was drawn incorporating the defect and placing the expected incisions within the relaxed skin tension lines where possible.    The area thus outlined was incised deep to adipose tissue with a #15 scalpel blade.  The skin margins were undermined to an appropriate distance in all directions utilizing iris scissors.
Complex Repair And Modified Advancement Flap Text: The defect edges were debeveled with a #15 scalpel blade.  The primary defect was closed partially with a complex linear closure.  Given the location of the remaining defect, shape of the defect and the proximity to free margins a modified advancement flap was deemed most appropriate for complete closure of the defect.  Using a sterile surgical marker, an appropriate advancement flap was drawn incorporating the defect and placing the expected incisions within the relaxed skin tension lines where possible.    The area thus outlined was incised deep to adipose tissue with a #15 scalpel blade.  The skin margins were undermined to an appropriate distance in all directions utilizing iris scissors.
Complex Repair And A-T Advancement Flap Text: The defect edges were debeveled with a #15 scalpel blade.  The primary defect was closed partially with a complex linear closure.  Given the location of the remaining defect, shape of the defect and the proximity to free margins an A-T advancement flap was deemed most appropriate for complete closure of the defect.  Using a sterile surgical marker, an appropriate advancement flap was drawn incorporating the defect and placing the expected incisions within the relaxed skin tension lines where possible.    The area thus outlined was incised deep to adipose tissue with a #15 scalpel blade.  The skin margins were undermined to an appropriate distance in all directions utilizing iris scissors.
Complex Repair And O-T Advancement Flap Text: The defect edges were debeveled with a #15 scalpel blade.  The primary defect was closed partially with a complex linear closure.  Given the location of the remaining defect, shape of the defect and the proximity to free margins an O-T advancement flap was deemed most appropriate for complete closure of the defect.  Using a sterile surgical marker, an appropriate advancement flap was drawn incorporating the defect and placing the expected incisions within the relaxed skin tension lines where possible.    The area thus outlined was incised deep to adipose tissue with a #15 scalpel blade.  The skin margins were undermined to an appropriate distance in all directions utilizing iris scissors.
Complex Repair And O-L Flap Text: The defect edges were debeveled with a #15 scalpel blade.  The primary defect was closed partially with a complex linear closure.  Given the location of the remaining defect, shape of the defect and the proximity to free margins an O-L flap was deemed most appropriate for complete closure of the defect.  Using a sterile surgical marker, an appropriate flap was drawn incorporating the defect and placing the expected incisions within the relaxed skin tension lines where possible.    The area thus outlined was incised deep to adipose tissue with a #15 scalpel blade.  The skin margins were undermined to an appropriate distance in all directions utilizing iris scissors.
Complex Repair And Bilobe Flap Text: The defect edges were debeveled with a #15 scalpel blade.  The primary defect was closed partially with a complex linear closure.  Given the location of the remaining defect, shape of the defect and the proximity to free margins a bilobe flap was deemed most appropriate for complete closure of the defect.  Using a sterile surgical marker, an appropriate advancement flap was drawn incorporating the defect and placing the expected incisions within the relaxed skin tension lines where possible.    The area thus outlined was incised deep to adipose tissue with a #15 scalpel blade.  The skin margins were undermined to an appropriate distance in all directions utilizing iris scissors.
Complex Repair And Melolabial Flap Text: The defect edges were debeveled with a #15 scalpel blade.  The primary defect was closed partially with a complex linear closure.  Given the location of the remaining defect, shape of the defect and the proximity to free margins a melolabial flap was deemed most appropriate for complete closure of the defect.  Using a sterile surgical marker, an appropriate advancement flap was drawn incorporating the defect and placing the expected incisions within the relaxed skin tension lines where possible.    The area thus outlined was incised deep to adipose tissue with a #15 scalpel blade.  The skin margins were undermined to an appropriate distance in all directions utilizing iris scissors.
Complex Repair And Rotation Flap Text: The defect edges were debeveled with a #15 scalpel blade.  The primary defect was closed partially with a complex linear closure.  Given the location of the remaining defect, shape of the defect and the proximity to free margins a rotation flap was deemed most appropriate for complete closure of the defect.  Using a sterile surgical marker, an appropriate advancement flap was drawn incorporating the defect and placing the expected incisions within the relaxed skin tension lines where possible.    The area thus outlined was incised deep to adipose tissue with a #15 scalpel blade.  The skin margins were undermined to an appropriate distance in all directions utilizing iris scissors.
Complex Repair And Rhombic Flap Text: The defect edges were debeveled with a #15 scalpel blade.  The primary defect was closed partially with a complex linear closure.  Given the location of the remaining defect, shape of the defect and the proximity to free margins a rhombic flap was deemed most appropriate for complete closure of the defect.  Using a sterile surgical marker, an appropriate advancement flap was drawn incorporating the defect and placing the expected incisions within the relaxed skin tension lines where possible.    The area thus outlined was incised deep to adipose tissue with a #15 scalpel blade.  The skin margins were undermined to an appropriate distance in all directions utilizing iris scissors.
Complex Repair And Transposition Flap Text: The defect edges were debeveled with a #15 scalpel blade.  The primary defect was closed partially with a complex linear closure.  Given the location of the remaining defect, shape of the defect and the proximity to free margins a transposition flap was deemed most appropriate for complete closure of the defect.  Using a sterile surgical marker, an appropriate advancement flap was drawn incorporating the defect and placing the expected incisions within the relaxed skin tension lines where possible.    The area thus outlined was incised deep to adipose tissue with a #15 scalpel blade.  The skin margins were undermined to an appropriate distance in all directions utilizing iris scissors.
Complex Repair And V-Y Plasty Text: The defect edges were debeveled with a #15 scalpel blade.  The primary defect was closed partially with a complex linear closure.  Given the location of the remaining defect, shape of the defect and the proximity to free margins a V-Y plasty was deemed most appropriate for complete closure of the defect.  Using a sterile surgical marker, an appropriate advancement flap was drawn incorporating the defect and placing the expected incisions within the relaxed skin tension lines where possible.    The area thus outlined was incised deep to adipose tissue with a #15 scalpel blade.  The skin margins were undermined to an appropriate distance in all directions utilizing iris scissors.
Complex Repair And M Plasty Text: The defect edges were debeveled with a #15 scalpel blade.  The primary defect was closed partially with a complex linear closure.  Given the location of the remaining defect, shape of the defect and the proximity to free margins an M plasty was deemed most appropriate for complete closure of the defect.  Using a sterile surgical marker, an appropriate advancement flap was drawn incorporating the defect and placing the expected incisions within the relaxed skin tension lines where possible.    The area thus outlined was incised deep to adipose tissue with a #15 scalpel blade.  The skin margins were undermined to an appropriate distance in all directions utilizing iris scissors.
Complex Repair And Double M Plasty Text: The defect edges were debeveled with a #15 scalpel blade.  The primary defect was closed partially with a complex linear closure.  Given the location of the remaining defect, shape of the defect and the proximity to free margins a double M plasty was deemed most appropriate for complete closure of the defect.  Using a sterile surgical marker, an appropriate advancement flap was drawn incorporating the defect and placing the expected incisions within the relaxed skin tension lines where possible.    The area thus outlined was incised deep to adipose tissue with a #15 scalpel blade.  The skin margins were undermined to an appropriate distance in all directions utilizing iris scissors.
Complex Repair And W Plasty Text: The defect edges were debeveled with a #15 scalpel blade.  The primary defect was closed partially with a complex linear closure.  Given the location of the remaining defect, shape of the defect and the proximity to free margins a W plasty was deemed most appropriate for complete closure of the defect.  Using a sterile surgical marker, an appropriate advancement flap was drawn incorporating the defect and placing the expected incisions within the relaxed skin tension lines where possible.    The area thus outlined was incised deep to adipose tissue with a #15 scalpel blade.  The skin margins were undermined to an appropriate distance in all directions utilizing iris scissors.
Complex Repair And Z Plasty Text: The defect edges were debeveled with a #15 scalpel blade.  The primary defect was closed partially with a complex linear closure.  Given the location of the remaining defect, shape of the defect and the proximity to free margins a Z plasty was deemed most appropriate for complete closure of the defect.  Using a sterile surgical marker, an appropriate advancement flap was drawn incorporating the defect and placing the expected incisions within the relaxed skin tension lines where possible.    The area thus outlined was incised deep to adipose tissue with a #15 scalpel blade.  The skin margins were undermined to an appropriate distance in all directions utilizing iris scissors.
Complex Repair And Dorsal Nasal Flap Text: The defect edges were debeveled with a #15 scalpel blade.  The primary defect was closed partially with a complex linear closure.  Given the location of the remaining defect, shape of the defect and the proximity to free margins a dorsal nasal flap was deemed most appropriate for complete closure of the defect.  Using a sterile surgical marker, an appropriate flap was drawn incorporating the defect and placing the expected incisions within the relaxed skin tension lines where possible.    The area thus outlined was incised deep to adipose tissue with a #15 scalpel blade.  The skin margins were undermined to an appropriate distance in all directions utilizing iris scissors.
Complex Repair And Ftsg Text: The defect edges were debeveled with a #15 scalpel blade.  The primary defect was closed partially with a complex linear closure.  Given the location of the defect, shape of the defect and the proximity to free margins a full thickness skin graft was deemed most appropriate to repair the remaining defect.  The graft was trimmed to fit the size of the remaining defect.  The graft was then placed in the primary defect, oriented appropriately, and sutured into place.
Complex Repair And Burow's Graft Text: The defect edges were debeveled with a #15 scalpel blade.  The primary defect was closed partially with a complex linear closure.  Given the location of the defect, shape of the defect, the proximity to free margins and the presence of a standing cone deformity a Burow's graft was deemed most appropriate to repair the remaining defect.  The graft was trimmed to fit the size of the remaining defect.  The graft was then placed in the primary defect, oriented appropriately, and sutured into place.
Complex Repair And Split-Thickness Skin Graft Text: The defect edges were debeveled with a #15 scalpel blade.  The primary defect was closed partially with a complex linear closure.  Given the location of the defect, shape of the defect and the proximity to free margins a split thickness skin graft was deemed most appropriate to repair the remaining defect.  The graft was trimmed to fit the size of the remaining defect.  The graft was then placed in the primary defect, oriented appropriately, and sutured into place.
Complex Repair And Epidermal Autograft Text: The defect edges were debeveled with a #15 scalpel blade.  The primary defect was closed partially with a complex linear closure.  Given the location of the defect, shape of the defect and the proximity to free margins an epidermal autograft was deemed most appropriate to repair the remaining defect.  The graft was trimmed to fit the size of the remaining defect.  The graft was then placed in the primary defect, oriented appropriately, and sutured into place.
Complex Repair And Dermal Autograft Text: The defect edges were debeveled with a #15 scalpel blade.  The primary defect was closed partially with a complex linear closure.  Given the location of the defect, shape of the defect and the proximity to free margins an dermal autograft was deemed most appropriate to repair the remaining defect.  The graft was trimmed to fit the size of the remaining defect.  The graft was then placed in the primary defect, oriented appropriately, and sutured into place.
Complex Repair And Tissue Cultured Epidermal Autograft Text: The defect edges were debeveled with a #15 scalpel blade.  The primary defect was closed partially with a complex linear closure.  Given the location of the defect, shape of the defect and the proximity to free margins an tissue cultured epidermal autograft was deemed most appropriate to repair the remaining defect.  The graft was trimmed to fit the size of the remaining defect.  The graft was then placed in the primary defect, oriented appropriately, and sutured into place.
Complex Repair And Xenograft Text: The defect edges were debeveled with a #15 scalpel blade.  The primary defect was closed partially with a complex linear closure.  Given the location of the defect, shape of the defect and the proximity to free margins a xenograft was deemed most appropriate to repair the remaining defect.  The graft was trimmed to fit the size of the remaining defect.  The graft was then placed in the primary defect, oriented appropriately, and sutured into place.
Complex Repair And Skin Substitute Graft Text: The defect edges were debeveled with a #15 scalpel blade.  The primary defect was closed partially with a complex linear closure.  Given the location of the remaining defect, shape of the defect and the proximity to free margins a skin substitute graft was deemed most appropriate to repair the remaining defect.  The graft was trimmed to fit the size of the remaining defect.  The graft was then placed in the primary defect, oriented appropriately, and sutured into place.
Include Anticoagulation In Mohs Note?: Please Select the Appropriate Response
Path Notes (To The Dermatopathologist): Please check margins.
Consent was obtained from the patient. The risks and benefits to therapy were discussed in detail. Specifically, the risks of infection, scarring, bleeding, prolonged wound healing, incomplete removal, allergy to anesthesia, nerve injury and recurrence were addressed. Prior to the procedure, the treatment site was clearly identified and confirmed by the patient.
Post-Care Instructions: I reviewed with the patient in detail post-care instructions. Patient is not to engage in any heavy lifting, exercise, or swimming for the next 14 days. Should the patient develop any fevers, chills, bleeding, severe pain patient will contact the office immediately.
Home Suture Removal Text: Patient was provided a home suture removal kit and will remove their sutures at home.  If they have any questions or difficulties they will call the office.
Where Do You Want The Question To Include Opioid Counseling Located?: Case Summary Tab
Information: Selecting Yes will display possible errors in your note based on the variables you have selected. This validation is only offered as a suggestion for you. PLEASE NOTE THAT THE VALIDATION TEXT WILL BE REMOVED WHEN YOU FINALIZE YOUR NOTE. IF YOU WANT TO FAX A PRELIMINARY NOTE YOU WILL NEED TO TOGGLE THIS TO 'NO' IF YOU DO NOT WANT IT IN YOUR FAXED NOTE.
Body Location Override (Optional - Billing Will Still Be Based On Selected Body Map Location If Applicable): left superior lateral malar cheek
Deep Sutures: 5-0 Monocryl
Body Location Override (Optional - Billing Will Still Be Based On Selected Body Map Location If Applicable): left central buccal cheek

## 2024-12-17 NOTE — HPI: PROCEDURE (SKIN SURGERY)
[id number]: previous_biopsy_has_been_previously_biopsied
Has The Growth Been Previously Biopsied?: has been previously biopsied

## 2024-12-17 NOTE — PROCEDURE: CURETTAGE AND DESTRUCTION
Detail Level: Detailed
Number Of Curettages: 3
Size Of Lesion In Cm: 0.6
Size Of Lesion After Curettage: 0.9
Add Intralesional Injection: No
Concentration (Mg/Ml Or Millions Of Plaque Forming Units/Cc): 0.01
Total Volume (Ccs): 1
Anesthesia Type: 1% lidocaine with epinephrine
Cautery Type: electrodesiccation
What Was Performed First?: Curettage
Final Size Statement: The size of the lesion after curettage was
Additional Information: (Optional): The wound was cleaned, and a pressure dressing was applied.  The patient received detailed post-op instructions.
Consent was obtained from the patient. The risks, benefits and alternatives to therapy were discussed in detail. Specifically, the risks of infection, scarring, bleeding, prolonged wound healing, nerve injury, incomplete removal, allergy to anesthesia and recurrence were addressed. Alternatives to ED&C, such as: surgical removal and XRT were also discussed.  Prior to the procedure, the treatment site was clearly identified and confirmed by the patient.
Post-Care Instructions: I reviewed with the patient in detail post-care instructions. Patient is to keep the area dry for 48 hours, and not to engage in any swimming until the area is healed. Should the patient develop any fevers, chills, bleeding, severe pain patient will contact the office immediately.
Bill As A Line Item Or As Units: Line Item

## 2024-12-19 RX ORDER — AMLODIPINE BESYLATE 5 MG/1
5 TABLET ORAL DAILY
Qty: 30 TABLET | Refills: 0 | OUTPATIENT
Start: 2024-12-19

## 2024-12-19 RX ORDER — AMLODIPINE BESYLATE 5 MG/1
5 TABLET ORAL DAILY
Qty: 90 TABLET | Refills: 3 | Status: SHIPPED | OUTPATIENT
Start: 2024-12-19

## 2024-12-19 NOTE — TELEPHONE ENCOUNTER
MEDICATION REFILL REQUEST      Name of Medication:  Amlodipine  Dose:  5 mng  Frequency:  QD  Quantity:  90  Days' supply:  90 with refills      Pharmacy Name/Location:  [facility]-[phone]

## 2024-12-23 ENCOUNTER — APPOINTMENT (OUTPATIENT)
Dept: URBAN - METROPOLITAN AREA CLINIC 329 | Facility: CLINIC | Age: 84
Setting detail: DERMATOLOGY
End: 2024-12-23

## 2024-12-23 DIAGNOSIS — Z48.02 ENCOUNTER FOR REMOVAL OF SUTURES: ICD-10-CM

## 2024-12-23 PROCEDURE — ? SUTURE REMOVAL (GLOBAL PERIOD)

## 2024-12-23 PROCEDURE — 99024 POSTOP FOLLOW-UP VISIT: CPT

## 2024-12-23 ASSESSMENT — LOCATION SIMPLE DESCRIPTION DERM
LOCATION SIMPLE: LEFT CHEEK
LOCATION SIMPLE: LEFT FOREHEAD

## 2024-12-23 ASSESSMENT — LOCATION DETAILED DESCRIPTION DERM
LOCATION DETAILED: LEFT SUPERIOR FOREHEAD
LOCATION DETAILED: LEFT LATERAL BUCCAL CHEEK
LOCATION DETAILED: LEFT CENTRAL MALAR CHEEK

## 2024-12-23 ASSESSMENT — LOCATION ZONE DERM: LOCATION ZONE: FACE

## 2024-12-23 NOTE — PROCEDURE: SUTURE REMOVAL (GLOBAL PERIOD)
Detail Level: Detailed
Add 42014 Cpt? (Important Note: In 2017 The Use Of 21148 Is Being Tracked By Cms To Determine Future Global Period Reimbursement For Global Periods): yes

## 2024-12-26 ENCOUNTER — APPOINTMENT (OUTPATIENT)
Dept: URBAN - METROPOLITAN AREA CLINIC 329 | Facility: CLINIC | Age: 84
Setting detail: DERMATOLOGY
End: 2024-12-26

## 2024-12-26 PROBLEM — C44.319 BASAL CELL CARCINOMA OF SKIN OF OTHER PARTS OF FACE: Status: ACTIVE | Noted: 2024-12-26

## 2024-12-26 PROCEDURE — ? SHAVE REMOVAL AND DESTRUCTION

## 2024-12-26 PROCEDURE — ? COUNSELING

## 2024-12-26 PROCEDURE — 17283 DSTR MAL LS F/E/E/N/L/M2.1-3: CPT | Mod: 79

## 2024-12-26 NOTE — PROCEDURE: SHAVE REMOVAL AND DESTRUCTION
Detail Level: Detailed
Size Of Lesion In Cm: 1.9
Size After Destruction (Required For Destruction Billing): 2.1
Anesthesia Type: 1% lidocaine with epinephrine
Anesthesia Volume In Cc: 0
Hemostasis: Drysol
Cautery Type: electrodesiccation
Was Curettage Performed?: Yes
Number Of Curettages: 3
Wound Care: Petrolatum
Dressing: dry sterile dressing
Bill As?: Note: Bill Malignant Destruction If Path Confirms Malignant Lesion. Only Bill As Shave Removal If Path Comes Back Benign. Do Not Bill Shave Removal On Malignant Lesions.: Malignant Destruction
Lab: 6
Render Path Notes In Note?: No
Consent: Written consent was obtained and risks were reviewed including but not limited to scarring, infection, bleeding, scabbing, incomplete removal, nerve damage and allergy to anesthesia.
Post-Care Instructions: I reviewed with the patient in detail post-care instructions. Patient is to keep the biopsy site dry overnight, and then apply bacitracin twice daily until healed. Patient may apply hydrogen peroxide soaks to remove any crusting.
Notification Instructions: Patient will be notified of biopsy results. However, patient instructed to call the office if not contacted within 2 weeks.
Billing Type: Third-Party Bill

## 2024-12-26 NOTE — HPI: EVALUATION OF SKIN LESION(S)
Hpi Title: Evaluation of Skin Lesions
Additional History: Pt reporting to have examination of the face. An area of concern was noted when he was here for a procedure appointment and Dr. Gregory wanted the patient to come back in.

## 2025-02-13 NOTE — PROCEDURE: EXCISION
Next available new patient appointment, Dr. Thelma Ambriz.  Patient had been seen in the emergency room.   Split-Thickness Skin Graft Text: The defect edges were debeveled with a #15 scalpel blade.  Given the location of the defect, shape of the defect and the proximity to free margins a split thickness skin graft was deemed most appropriate.  Using a sterile surgical marker, the primary defect shape was transferred to the donor site. The split thickness graft was then harvested.  The skin graft was then placed in the primary defect and oriented appropriately.

## 2025-03-21 ENCOUNTER — OFFICE VISIT (OUTPATIENT)
Age: 85
End: 2025-03-21
Payer: MEDICARE

## 2025-03-21 VITALS
DIASTOLIC BLOOD PRESSURE: 80 MMHG | WEIGHT: 167 LBS | SYSTOLIC BLOOD PRESSURE: 138 MMHG | HEIGHT: 66 IN | HEART RATE: 65 BPM | BODY MASS INDEX: 26.84 KG/M2

## 2025-03-21 DIAGNOSIS — E78.00 PURE HYPERCHOLESTEROLEMIA: ICD-10-CM

## 2025-03-21 DIAGNOSIS — I25.10 CORONARY ARTERY DISEASE INVOLVING NATIVE CORONARY ARTERY OF NATIVE HEART WITHOUT ANGINA PECTORIS: Primary | ICD-10-CM

## 2025-03-21 DIAGNOSIS — I10 ESSENTIAL HYPERTENSION: ICD-10-CM

## 2025-03-21 PROCEDURE — 93000 ELECTROCARDIOGRAM COMPLETE: CPT | Performed by: INTERNAL MEDICINE

## 2025-03-21 PROCEDURE — 1126F AMNT PAIN NOTED NONE PRSNT: CPT | Performed by: INTERNAL MEDICINE

## 2025-03-21 PROCEDURE — 3079F DIAST BP 80-89 MM HG: CPT | Performed by: INTERNAL MEDICINE

## 2025-03-21 PROCEDURE — 1123F ACP DISCUSS/DSCN MKR DOCD: CPT | Performed by: INTERNAL MEDICINE

## 2025-03-21 PROCEDURE — 99214 OFFICE O/P EST MOD 30 MIN: CPT | Performed by: INTERNAL MEDICINE

## 2025-03-21 PROCEDURE — 3075F SYST BP GE 130 - 139MM HG: CPT | Performed by: INTERNAL MEDICINE

## 2025-03-21 RX ORDER — SIMVASTATIN 40 MG
40 TABLET ORAL NIGHTLY
Qty: 90 TABLET | Refills: 3 | Status: SHIPPED | OUTPATIENT
Start: 2025-03-21

## 2025-03-21 RX ORDER — LISINOPRIL 30 MG/1
30 TABLET ORAL DAILY
Qty: 90 TABLET | Refills: 3 | Status: SHIPPED | OUTPATIENT
Start: 2025-03-21

## 2025-03-21 RX ORDER — AMLODIPINE BESYLATE 5 MG/1
5 TABLET ORAL DAILY
Qty: 90 TABLET | Refills: 3 | Status: SHIPPED | OUTPATIENT
Start: 2025-03-21

## 2025-03-21 NOTE — PROGRESS NOTES
[facility] CARDIOLOGY  [address] [place] [address], [address]  [place] [address]  PHONE: [phone]      25    NAME:  Mahesh Mir  : 1940  MRN: [id number]       SUBJECTIVE:   Mahesh Mir is a 84 y.o. male seen for a follow up visit regarding the following:     Chief Complaint   Patient presents with    Coronary Artery Disease    Hypertension         HPI:    No cp or raymond. No orthopnea or pnd. No palpitations or syncope.      Past Medical History, Past Surgical History, Family history, Social History, and Medications were all reviewed with the patient today and updated as necessary.     Current Outpatient Medications   Medication Sig Dispense Refill    amLODIPine (NORVASC) 5 MG tablet Take 1 tablet by mouth daily 90 tablet 3    simvastatin (ZOCOR) 40 MG tablet Take 1 tablet by mouth nightly 90 tablet 3    lisinopril (PRINIVIL;ZESTRIL) 30 MG tablet Take 1 tablet by mouth daily 90 tablet 3    Multiple Vitamin (DAILY VITAMIN PO) Take by mouth      aspirin 81 MG chewable tablet Take 1 tablet by mouth       No current facility-administered medications for this visit.               Social History     Tobacco Use    Smoking status: Never    Smokeless tobacco: Never   Substance Use Topics    Alcohol use: No              PHYSICAL EXAM:   /80   Pulse 65   Ht 1.676 m (5' 6\")   Wt 75.8 kg (167 lb)   BMI 26.95 kg/m²    Constitutional: Oriented to person, place, and time. Appears well-developed and well-nourished.   Head: Normocephalic and atraumatic.   Neck: Neck supple.   Cardiovascular: Normal rate and regular rhythm with no murmur -No JVP  Pulmonary/Chest: Breath sounds normal.   Abdominal: Soft.   Musculoskeletal: No edema.   Neurological: Alert and oriented to person, place, and time.   Skin: Skin is warm and dry.   Psychiatric: Normal mood and affect.   Vitals reviewed.      Wt Readings from Last 3 Encounters:   25 75.8 kg (167 lb)   10/01/24 75.8 kg (167 lb)   24 75.8 kg

## 2025-03-26 ENCOUNTER — APPOINTMENT (OUTPATIENT)
Dept: URBAN - METROPOLITAN AREA CLINIC 329 | Facility: CLINIC | Age: 85
Setting detail: DERMATOLOGY
End: 2025-03-26

## 2025-03-26 DIAGNOSIS — D22 MELANOCYTIC NEVI: ICD-10-CM

## 2025-03-26 DIAGNOSIS — L82.1 OTHER SEBORRHEIC KERATOSIS: ICD-10-CM

## 2025-03-26 DIAGNOSIS — Z71.89 OTHER SPECIFIED COUNSELING: ICD-10-CM

## 2025-03-26 DIAGNOSIS — D485 NEOPLASM OF UNCERTAIN BEHAVIOR OF SKIN: ICD-10-CM

## 2025-03-26 DIAGNOSIS — D18.0 HEMANGIOMA: ICD-10-CM

## 2025-03-26 DIAGNOSIS — L81.4 OTHER MELANIN HYPERPIGMENTATION: ICD-10-CM

## 2025-03-26 DIAGNOSIS — L57.8 OTHER SKIN CHANGES DUE TO CHRONIC EXPOSURE TO NONIONIZING RADIATION: ICD-10-CM | Status: STABLE

## 2025-03-26 DIAGNOSIS — Z85.828 PERSONAL HISTORY OF OTHER MALIGNANT NEOPLASM OF SKIN: ICD-10-CM

## 2025-03-26 DIAGNOSIS — Z12.83 ENCOUNTER FOR SCREENING FOR MALIGNANT NEOPLASM OF SKIN: ICD-10-CM

## 2025-03-26 PROBLEM — D22.61 MELANOCYTIC NEVI OF RIGHT UPPER LIMB, INCLUDING SHOULDER: Status: ACTIVE | Noted: 2025-03-26

## 2025-03-26 PROBLEM — D22.5 MELANOCYTIC NEVI OF TRUNK: Status: ACTIVE | Noted: 2025-03-26

## 2025-03-26 PROBLEM — D22.71 MELANOCYTIC NEVI OF RIGHT LOWER LIMB, INCLUDING HIP: Status: ACTIVE | Noted: 2025-03-26

## 2025-03-26 PROBLEM — D18.01 HEMANGIOMA OF SKIN AND SUBCUTANEOUS TISSUE: Status: ACTIVE | Noted: 2025-03-26

## 2025-03-26 PROBLEM — D48.5 NEOPLASM OF UNCERTAIN BEHAVIOR OF SKIN: Status: ACTIVE | Noted: 2025-03-26

## 2025-03-26 PROCEDURE — 99213 OFFICE O/P EST LOW 20 MIN: CPT | Mod: 25

## 2025-03-26 PROCEDURE — ? DERMATOSCOPIC EVALUATION

## 2025-03-26 PROCEDURE — ? ADDITIONAL NOTES

## 2025-03-26 PROCEDURE — ? BIOPSY BY SHAVE METHOD

## 2025-03-26 PROCEDURE — ? FULL BODY SKIN EXAM

## 2025-03-26 PROCEDURE — ? COUNSELING

## 2025-03-26 PROCEDURE — ? SUNSCREEN RECOMMENDATIONS

## 2025-03-26 PROCEDURE — ? TREATMENT REGIMEN

## 2025-03-26 PROCEDURE — 11102 TANGNTL BX SKIN SINGLE LES: CPT

## 2025-03-26 ASSESSMENT — LOCATION DETAILED DESCRIPTION DERM
LOCATION DETAILED: RIGHT DISTAL POSTERIOR THIGH
LOCATION DETAILED: RIGHT PROXIMAL POSTERIOR UPPER ARM
LOCATION DETAILED: EPIGASTRIC SKIN
LOCATION DETAILED: LEFT DISTAL PRETIBIAL REGION
LOCATION DETAILED: RIGHT POSTERIOR ANKLE
LOCATION DETAILED: RIGHT ANTERIOR PROXIMAL UPPER ARM
LOCATION DETAILED: LEFT INFERIOR ANTERIOR NECK
LOCATION DETAILED: RIGHT DISTAL PRETIBIAL REGION
LOCATION DETAILED: MIDDLE STERNUM
LOCATION DETAILED: RIGHT PROXIMAL DORSAL FOREARM
LOCATION DETAILED: RIGHT SUPERIOR MEDIAL UPPER BACK
LOCATION DETAILED: LEFT MEDIAL UPPER BACK
LOCATION DETAILED: LEFT CENTRAL MALAR CHEEK
LOCATION DETAILED: RIGHT INFERIOR MEDIAL UPPER BACK
LOCATION DETAILED: RIGHT ANTERIOR DISTAL THIGH

## 2025-03-26 ASSESSMENT — LOCATION ZONE DERM
LOCATION ZONE: TRUNK
LOCATION ZONE: FACE
LOCATION ZONE: LEG
LOCATION ZONE: ARM
LOCATION ZONE: NECK

## 2025-03-26 ASSESSMENT — LOCATION SIMPLE DESCRIPTION DERM
LOCATION SIMPLE: ABDOMEN
LOCATION SIMPLE: LEFT ANTERIOR NECK
LOCATION SIMPLE: RIGHT UPPER ARM
LOCATION SIMPLE: LEFT PRETIBIAL REGION
LOCATION SIMPLE: CHEST
LOCATION SIMPLE: RIGHT FOREARM
LOCATION SIMPLE: RIGHT POSTERIOR UPPER ARM
LOCATION SIMPLE: RIGHT THIGH
LOCATION SIMPLE: RIGHT POSTERIOR THIGH
LOCATION SIMPLE: RIGHT ANKLE
LOCATION SIMPLE: LEFT UPPER BACK
LOCATION SIMPLE: LEFT CHEEK
LOCATION SIMPLE: RIGHT PRETIBIAL REGION
LOCATION SIMPLE: RIGHT UPPER BACK

## 2025-04-16 ENCOUNTER — OFFICE VISIT (OUTPATIENT)
Dept: VASCULAR SURGERY | Age: 85
End: 2025-04-16
Payer: MEDICARE

## 2025-04-16 VITALS
DIASTOLIC BLOOD PRESSURE: 67 MMHG | SYSTOLIC BLOOD PRESSURE: 155 MMHG | HEART RATE: 54 BPM | HEIGHT: 66 IN | OXYGEN SATURATION: 93 % | BODY MASS INDEX: 26.68 KG/M2 | WEIGHT: 166 LBS

## 2025-04-16 DIAGNOSIS — I65.23 BILATERAL CAROTID ARTERY STENOSIS: Primary | ICD-10-CM

## 2025-04-16 DIAGNOSIS — Z98.890 H/O CAROTID ENDARTERECTOMY: ICD-10-CM

## 2025-04-16 PROCEDURE — 3078F DIAST BP <80 MM HG: CPT | Performed by: NURSE PRACTITIONER

## 2025-04-16 PROCEDURE — 1159F MED LIST DOCD IN RCRD: CPT | Performed by: NURSE PRACTITIONER

## 2025-04-16 PROCEDURE — G2211 COMPLEX E/M VISIT ADD ON: HCPCS | Performed by: NURSE PRACTITIONER

## 2025-04-16 PROCEDURE — 1160F RVW MEDS BY RX/DR IN RCRD: CPT | Performed by: NURSE PRACTITIONER

## 2025-04-16 PROCEDURE — 1123F ACP DISCUSS/DSCN MKR DOCD: CPT | Performed by: NURSE PRACTITIONER

## 2025-04-16 PROCEDURE — 3077F SYST BP >= 140 MM HG: CPT | Performed by: NURSE PRACTITIONER

## 2025-04-16 PROCEDURE — 99213 OFFICE O/P EST LOW 20 MIN: CPT | Performed by: NURSE PRACTITIONER

## 2025-04-16 NOTE — PROGRESS NOTES
DATE OF VISIT: 4/16/2025      [facility] [place]  Mahesh Mir is a 84 y.o. male who follows up for his carotid duplex study. He denies any new lateralizing neurological symptoms. He has undergone bilateral carotid endarterectomies. 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  Respiratory:   Negative for hemoptysis.  Cardiovascular:   Negative except as noted in HPI.  Musculoskeletal:  Negative for active, unexplained/severe joint pain.      ALLERGIES: No Known Allergies    CURRENT MEDICATIONS:  Current Outpatient Medications   Medication Sig Dispense Refill    amLODIPine (NORVASC) 5 MG tablet Take 1 tablet by mouth daily 90 tablet 3    simvastatin (ZOCOR) 40 MG tablet Take 1 tablet by mouth nightly 90 tablet 3    lisinopril (PRINIVIL;ZESTRIL) 30 MG tablet Take 1 tablet by mouth daily 90 tablet 3    Multiple Vitamin (DAILY VITAMIN PO) Take by mouth      aspirin 81 MG chewable

## 2025-05-13 ENCOUNTER — APPOINTMENT (OUTPATIENT)
Dept: URBAN - METROPOLITAN AREA CLINIC 329 | Facility: CLINIC | Age: 85
Setting detail: DERMATOLOGY
End: 2025-05-13

## 2025-05-13 PROBLEM — C44.319 BASAL CELL CARCINOMA OF SKIN OF OTHER PARTS OF FACE: Status: ACTIVE | Noted: 2025-05-13

## 2025-05-13 PROCEDURE — 11642 EXC F/E/E/N/L MAL+MRG 1.1-2: CPT

## 2025-05-13 PROCEDURE — 12052 INTMD RPR FACE/MM 2.6-5.0 CM: CPT

## 2025-05-13 PROCEDURE — ? EXCISION

## 2025-05-13 NOTE — PROCEDURE: EXCISION
Body Location Override (Optional - Billing Will Still Be Based On Selected Body Map Location If Applicable): left central malar cheek
Accession #: [id number]
Size Of Lesion In Cm: 1.1
X Size Of Lesion In Cm (Optional): 0
Size Of Margin In Cm: 0.2
Was An Eye Clamp Used?: No
Eye Clamp Note Details: An eye clamp was used during the procedure.
Excision Method: Elliptical
Saucerization Depth: dermis and superficial adipose tissue
Repair Type: Intermediate
Intermediate / Complex Repair - Final Wound Length In Cm: 4.5
Deep Sutures: 5-0 Monocryl
Epidermal Sutures: 5-0 Prolene
Suture Removal: 7 days
Suturegard Retention Suture: 2-0 Nylon
Retention Suture Bite Size: 3 mm
Length To Time In Minutes Device Was In Place: 10
Number Of Hemigard Strips Per Side: 1
Undermining Type: Entire Wound
Debridement Text: The wound edges were debrided prior to proceeding with the closure to facilitate wound healing.
Helical Rim Text: The closure involved the helical rim.
Vermilion Border Text: The closure involved the vermilion border.
Nostril Rim Text: The closure involved the nostril rim.
Retention Suture Text: Retention sutures were placed to support the closure and prevent dehiscence.
Lab: [id number]
Lab Facility: [id number]
Graft Donor Site Bandage (Optional-Leave Blank If You Don't Want In Note): Steri-strips and a pressure bandage were applied to the donor site.
Epidermal Closure Graft Donor Site (Optional): simple interrupted
Billing Type: Third-Party Bill
Excision Depth: adipose tissue
Scalpel Size: 15 blade
Anesthesia Type: 1% lidocaine with epinephrine
Hemostasis: Electrocautery
Estimated Blood Loss (Cc): minimal
Detail Level: Detailed
Repair Depth: use same depth as excision depth
Wound Care: Petrolatum
Dressing: dry sterile dressing
Suturegard Intro: Intraoperative tissue expansion was performed, utilizing the SUTUREGARD device, in order to reduce wound tension.
Suturegard Body: The suture ends were repeatedly re-tightened and re-clamped to achieve the desired tissue expansion.
Hemigard Intro: Due to skin fragility and wound tension, it was decided to use HEMIGARD adhesive retention suture devices to permit a linear closure. The skin was cleaned and dried for a 6cm distance away from the wound. Excessive hair, if present, was removed to allow for adhesion.
Hemigard Postcare Instructions: The HEMIGARD strips are to remain completely dry for at least 5-7 days.
Positioning (Leave Blank If You Do Not Want): The patient was placed in a comfortable position exposing the surgical site.
Pre-Excision Curettage Text (Leave Blank If You Do Not Want): Prior to drawing the surgical margin the visible lesion was removed with electrodesiccation and curettage to clearly define the lesion size.
Complex Repair Preamble Text (Leave Blank If You Do Not Want): Extensive wide undermining was performed.
Intermediate Repair Preamble Text (Leave Blank If You Do Not Want): Undermining was performed with blunt dissection.
Curvilinear Excision Additional Text (Leave Blank If You Do Not Want): The margin was drawn around the clinically apparent lesion.  A curvilinear shape was then drawn on the skin incorporating the lesion and margins.  Incisions were then made along these lines to the appropriate tissue plane and the lesion was extirpated.
Fusiform Excision Additional Text (Leave Blank If You Do Not Want): The margin was drawn around the clinically apparent lesion.  A fusiform shape was then drawn on the skin incorporating the lesion and margins.  Incisions were then made along these lines to the appropriate tissue plane and the lesion was extirpated.
Elliptical Excision Additional Text (Leave Blank If You Do Not Want): The margin was drawn around the clinically apparent lesion.  An elliptical shape was then drawn on the skin incorporating the lesion and margins.  Incisions were then made along these lines to the appropriate tissue plane and the lesion was extirpated.
Saucerization Excision Additional Text (Leave Blank If You Do Not Want): The margin was drawn around the clinically apparent lesion.  Incisions were then made along these lines, in a tangential fashion, to the appropriate tissue plane and the lesion was extirpated.
Slit Excision Additional Text (Leave Blank If You Do Not Want): A linear line was drawn on the skin overlying the lesion. An incision was made slowly until the lesion was visualized.  Once visualized, the lesion was removed with blunt dissection.
Excisional Biopsy Additional Text (Leave Blank If You Do Not Want): The margin was drawn around the clinically apparent lesion. An elliptical shape was then drawn on the skin incorporating the lesion and margins.  Incisions were then made along these lines to the appropriate tissue plane and the lesion was extirpated.
Perilesional Excision Additional Text (Leave Blank If You Do Not Want): The margin was drawn around the clinically apparent lesion. Incisions were then made along these lines to the appropriate tissue plane and the lesion was extirpated.
Repair Performed By Another Provider Text (Leave Blank If You Do Not Want): After the tissue was excised the defect was repaired by another provider.
No Repair - Repaired With Adjacent Surgical Defect Text (Leave Blank If You Do Not Want): After the excision the defect was repaired concurrently with another surgical defect which was in close approximation.
Adjacent Tissue Transfer Text: The defect edges were debeveled with a #15 scalpel blade.  Given the location of the defect and the proximity to free margins an adjacent tissue transfer was deemed most appropriate.  Using a sterile surgical marker, an appropriate flap was drawn incorporating the defect and placing the expected incisions within the relaxed skin tension lines where possible.    The area thus outlined was incised deep to adipose tissue with a #15 scalpel blade.  The skin margins were undermined to an appropriate distance in all directions utilizing iris scissors.
Advancement Flap (Single) Text: The defect edges were debeveled with a #15 scalpel blade.  Given the location of the defect and the proximity to free margins a single advancement flap was deemed most appropriate.  Using a sterile surgical marker, an appropriate advancement flap was drawn incorporating the defect and placing the expected incisions within the relaxed skin tension lines where possible.    The area thus outlined was incised deep to adipose tissue with a #15 scalpel blade.  The skin margins were undermined to an appropriate distance in all directions utilizing iris scissors.
Advancement Flap (Double) Text: The defect edges were debeveled with a #15 scalpel blade.  Given the location of the defect and the proximity to free margins a double advancement flap was deemed most appropriate.  Using a sterile surgical marker, the appropriate advancement flaps were drawn incorporating the defect and placing the expected incisions within the relaxed skin tension lines where possible.    The area thus outlined was incised deep to adipose tissue with a #15 scalpel blade.  The skin margins were undermined to an appropriate distance in all directions utilizing iris scissors.
Burow's Advancement Flap Text: The defect edges were debeveled with a #15 scalpel blade.  Given the location of the defect and the proximity to free margins a Burow's advancement flap was deemed most appropriate.  Using a sterile surgical marker, the appropriate advancement flap was drawn incorporating the defect and placing the expected incisions within the relaxed skin tension lines where possible.    The area thus outlined was incised deep to adipose tissue with a #15 scalpel blade.  The skin margins were undermined to an appropriate distance in all directions utilizing iris scissors.
Chonodrocutaneous Helical Advancement Flap Text: The defect edges were debeveled with a #15 scalpel blade.  Given the location of the defect and the proximity to free margins a chondrocutaneous helical advancement flap was deemed most appropriate.  Using a sterile surgical marker, the appropriate advancement flap was drawn incorporating the defect and placing the expected incisions within the relaxed skin tension lines where possible.    The area thus outlined was incised deep to adipose tissue with a #15 scalpel blade.  The skin margins were undermined to an appropriate distance in all directions utilizing iris scissors.
Crescentic Advancement Flap Text: The defect edges were debeveled with a #15 scalpel blade.  Given the location of the defect and the proximity to free margins a crescentic advancement flap was deemed most appropriate.  Using a sterile surgical marker, the appropriate advancement flap was drawn incorporating the defect and placing the expected incisions within the relaxed skin tension lines where possible.    The area thus outlined was incised deep to adipose tissue with a #15 scalpel blade.  The skin margins were undermined to an appropriate distance in all directions utilizing iris scissors.
A-T Advancement Flap Text: The defect edges were debeveled with a #15 scalpel blade.  Given the location of the defect, shape of the defect and the proximity to free margins an A-T advancement flap was deemed most appropriate.  Using a sterile surgical marker, an appropriate advancement flap was drawn incorporating the defect and placing the expected incisions within the relaxed skin tension lines where possible.    The area thus outlined was incised deep to adipose tissue with a #15 scalpel blade.  The skin margins were undermined to an appropriate distance in all directions utilizing iris scissors.
O-T Advancement Flap Text: The defect edges were debeveled with a #15 scalpel blade.  Given the location of the defect, shape of the defect and the proximity to free margins an O-T advancement flap was deemed most appropriate.  Using a sterile surgical marker, an appropriate advancement flap was drawn incorporating the defect and placing the expected incisions within the relaxed skin tension lines where possible.    The area thus outlined was incised deep to adipose tissue with a #15 scalpel blade.  The skin margins were undermined to an appropriate distance in all directions utilizing iris scissors.
O-L Flap Text: The defect edges were debeveled with a #15 scalpel blade.  Given the location of the defect, shape of the defect and the proximity to free margins an O-L flap was deemed most appropriate.  Using a sterile surgical marker, an appropriate advancement flap was drawn incorporating the defect and placing the expected incisions within the relaxed skin tension lines where possible.    The area thus outlined was incised deep to adipose tissue with a #15 scalpel blade.  The skin margins were undermined to an appropriate distance in all directions utilizing iris scissors.
O-Z Flap Text: The defect edges were debeveled with a #15 scalpel blade.  Given the location of the defect, shape of the defect and the proximity to free margins an O-Z flap was deemed most appropriate.  Using a sterile surgical marker, an appropriate transposition flap was drawn incorporating the defect and placing the expected incisions within the relaxed skin tension lines where possible. The area thus outlined was incised deep to adipose tissue with a #15 scalpel blade.  The skin margins were undermined to an appropriate distance in all directions utilizing iris scissors.
Double O-Z Flap Text: The defect edges were debeveled with a #15 scalpel blade.  Given the location of the defect, shape of the defect and the proximity to free margins a Double O-Z flap was deemed most appropriate.  Using a sterile surgical marker, an appropriate transposition flap was drawn incorporating the defect and placing the expected incisions within the relaxed skin tension lines where possible. The area thus outlined was incised deep to adipose tissue with a #15 scalpel blade.  The skin margins were undermined to an appropriate distance in all directions utilizing iris scissors.
V-Y Flap Text: The defect edges were debeveled with a #15 scalpel blade.  Given the location of the defect, shape of the defect and the proximity to free margins a V-Y flap was deemed most appropriate.  Using a sterile surgical marker, an appropriate advancement flap was drawn incorporating the defect and placing the expected incisions within the relaxed skin tension lines where possible.    The area thus outlined was incised deep to adipose tissue with a #15 scalpel blade.  The skin margins were undermined to an appropriate distance in all directions utilizing iris scissors.
Advancement-Rotation Flap Text: The defect edges were debeveled with a #15 scalpel blade.  Given the location of the defect, shape of the defect and the proximity to free margins an advancement-rotation flap was deemed most appropriate.  Using a sterile surgical marker, an appropriate flap was drawn incorporating the defect and placing the expected incisions within the relaxed skin tension lines where possible. The area thus outlined was incised deep to adipose tissue with a #15 scalpel blade.  The skin margins were undermined to an appropriate distance in all directions utilizing iris scissors.
Mercedes Flap Text: The defect edges were debeveled with a #15 scalpel blade.  Given the location of the defect, shape of the defect and the proximity to free margins a Mercedes flap was deemed most appropriate.  Using a sterile surgical marker, an appropriate advancement flap was drawn incorporating the defect and placing the expected incisions within the relaxed skin tension lines where possible. The area thus outlined was incised deep to adipose tissue with a #15 scalpel blade.  The skin margins were undermined to an appropriate distance in all directions utilizing iris scissors.
Modified Advancement Flap Text: The defect edges were debeveled with a #15 scalpel blade.  Given the location of the defect, shape of the defect and the proximity to free margins a modified advancement flap was deemed most appropriate.  Using a sterile surgical marker, an appropriate advancement flap was drawn incorporating the defect and placing the expected incisions within the relaxed skin tension lines where possible.    The area thus outlined was incised deep to adipose tissue with a #15 scalpel blade.  The skin margins were undermined to an appropriate distance in all directions utilizing iris scissors.
Mucosal Advancement Flap Text: Given the location of the defect, shape of the defect and the proximity to free margins a mucosal advancement flap was deemed most appropriate. Incisions were made with a 15 blade scalpel in the appropriate fashion along the cutaneous vermilion border and the mucosal lip. The remaining actinically damaged mucosal tissue was excised.  The mucosal advancement flap was then elevated to the gingival sulcus with care taken to preserve the neurovascular structures and advanced into the primary defect. Care was taken to ensure that precise realignment of the vermilion border was achieved.
Peng Advancement Flap Text: The defect edges were debeveled with a #15 scalpel blade.  Given the location of the defect, shape of the defect and the proximity to free margins a Peng advancement flap was deemed most appropriate.  Using a sterile surgical marker, an appropriate advancement flap was drawn incorporating the defect and placing the expected incisions within the relaxed skin tension lines where possible. The area thus outlined was incised deep to adipose tissue with a #15 scalpel blade.  The skin margins were undermined to an appropriate distance in all directions utilizing iris scissors.
Hatchet Flap Text: The defect edges were debeveled with a #15 scalpel blade.  Given the location of the defect, shape of the defect and the proximity to free margins a hatchet flap was deemed most appropriate.  Using a sterile surgical marker, an appropriate hatchet flap was drawn incorporating the defect and placing the expected incisions within the relaxed skin tension lines where possible.    The area thus outlined was incised deep to adipose tissue with a #15 scalpel blade.  The skin margins were undermined to an appropriate distance in all directions utilizing iris scissors.
Rotation Flap Text: The defect edges were debeveled with a #15 scalpel blade.  Given the location of the defect, shape of the defect and the proximity to free margins a rotation flap was deemed most appropriate.  Using a sterile surgical marker, an appropriate rotation flap was drawn incorporating the defect and placing the expected incisions within the relaxed skin tension lines where possible.    The area thus outlined was incised deep to adipose tissue with a #15 scalpel blade.  The skin margins were undermined to an appropriate distance in all directions utilizing iris scissors.
Bilateral Rotation Flap Text: The defect edges were debeveled with a #15 scalpel blade. Given the location of the defect, shape of the defect and the proximity to free margins a bilateral rotation flap was deemed most appropriate. Using a sterile surgical marker, an appropriate rotation flap was drawn incorporating the defect and placing the expected incisions within the relaxed skin tension lines where possible. The area thus outlined was incised deep to adipose tissue with a #15 scalpel blade. The skin margins were undermined to an appropriate distance in all directions utilizing iris scissors. Following this, the designed flap was carried over into the primary defect and sutured into place.
Spiral Flap Text: The defect edges were debeveled with a #15 scalpel blade.  Given the location of the defect, shape of the defect and the proximity to free margins a spiral flap was deemed most appropriate.  Using a sterile surgical marker, an appropriate rotation flap was drawn incorporating the defect and placing the expected incisions within the relaxed skin tension lines where possible. The area thus outlined was incised deep to adipose tissue with a #15 scalpel blade.  The skin margins were undermined to an appropriate distance in all directions utilizing iris scissors.
Staged Advancement Flap Text: The defect edges were debeveled with a #15 scalpel blade.  Given the location of the defect, shape of the defect and the proximity to free margins a staged advancement flap was deemed most appropriate.  Using a sterile surgical marker, an appropriate advancement flap was drawn incorporating the defect and placing the expected incisions within the relaxed skin tension lines where possible. The area thus outlined was incised deep to adipose tissue with a #15 scalpel blade.  The skin margins were undermined to an appropriate distance in all directions utilizing iris scissors.
Star Wedge Flap Text: The defect edges were debeveled with a #15 scalpel blade.  Given the location of the defect, shape of the defect and the proximity to free margins a star wedge flap was deemed most appropriate.  Using a sterile surgical marker, an appropriate rotation flap was drawn incorporating the defect and placing the expected incisions within the relaxed skin tension lines where possible. The area thus outlined was incised deep to adipose tissue with a #15 scalpel blade.  The skin margins were undermined to an appropriate distance in all directions utilizing iris scissors.
Transposition Flap Text: The defect edges were debeveled with a #15 scalpel blade.  Given the location of the defect and the proximity to free margins a transposition flap was deemed most appropriate.  Using a sterile surgical marker, an appropriate transposition flap was drawn incorporating the defect.    The area thus outlined was incised deep to adipose tissue with a #15 scalpel blade.  The skin margins were undermined to an appropriate distance in all directions utilizing iris scissors.
Muscle Hinge Flap Text: The defect edges were debeveled with a #15 scalpel blade.  Given the size, depth and location of the defect and the proximity to free margins a muscle hinge flap was deemed most appropriate.  Using a sterile surgical marker, an appropriate hinge flap was drawn incorporating the defect. The area thus outlined was incised with a #15 scalpel blade.  The skin margins were undermined to an appropriate distance in all directions utilizing iris scissors.
Mustarde Flap Text: The defect edges were debeveled with a #15 scalpel blade.  Given the size, depth and location of the defect and the proximity to free margins a Mustarde flap was deemed most appropriate.  Using a sterile surgical marker, an appropriate flap was drawn incorporating the defect. The area thus outlined was incised with a #15 scalpel blade.  The skin margins were undermined to an appropriate distance in all directions utilizing iris scissors.
Nasal Turnover Hinge Flap Text: The defect edges were debeveled with a #15 scalpel blade.  Given the size, depth, location of the defect and the defect being full thickness a nasal turnover hinge flap was deemed most appropriate.  Using a sterile surgical marker, an appropriate hinge flap was drawn incorporating the defect. The area thus outlined was incised with a #15 scalpel blade. The flap was designed to recreate the nasal mucosal lining and the alar rim. The skin margins were undermined to an appropriate distance in all directions utilizing iris scissors.
Nasalis-Muscle-Based Myocutaneous Island Pedicle Flap Text: Using a #15 blade, an incision was made around the donor flap to the level of the nasalis muscle. Wide lateral undermining was then performed in both the subcutaneous plane above the nasalis muscle, and in a submuscular plane just above periosteum. This allowed the formation of a free nasalis muscle axial pedicle (based on the angular artery) which was still attached to the actual cutaneous flap, increasing its mobility and vascular viability. Hemostasis was obtained with pinpoint electrocoagulation. The flap was mobilized into position and the pivotal anchor points positioned and stabilized with buried interrupted sutures. Subcutaneous and dermal tissues were closed in a multilayered fashion with sutures. Tissue redundancies were excised, and the epidermal edges were apposed without significant tension and sutured with sutures.
Nasalis Myocutaneous Flap Text: Using a #15 blade, an incision was made around the donor flap to the level of the nasalis muscle. Wide lateral undermining was then performed in both the subcutaneous plane above the nasalis muscle, and in a submuscular plane just above periosteum. This allowed the formation of a free nasalis muscle axial pedicle which was still attached to the actual cutaneous flap, increasing its mobility and vascular viability. Hemostasis was obtained with pinpoint electrocoagulation. The flap was mobilized into position and the pivotal anchor points positioned and stabilized with buried interrupted sutures. Subcutaneous and dermal tissues were closed in a multilayered fashion with sutures. Tissue redundancies were excised, and the epidermal edges were apposed without significant tension and sutured with sutures.
Nasolabial Transposition Flap Text: The defect edges were debeveled with a #15 scalpel blade.  Given the size, depth and location of the defect and the proximity to free margins a nasolabial transposition flap was deemed most appropriate. Using a sterile surgical marker, an appropriate flap was drawn incorporating the defect. The area thus outlined was incised with a #15 scalpel blade. The skin margins were undermined to an appropriate distance in all directions utilizing iris scissors. Following this, the designed flap was carried into the primary defect and sutured into place.
Orbicularis Oris Muscle Flap Text: The defect edges were debeveled with a #15 scalpel blade.  Given that the defect affected the competency of the oral sphincter an orbicularis oris muscle flap was deemed most appropriate to restore this competency and normal muscle function.  Using a sterile surgical marker, an appropriate flap was drawn incorporating the defect. The area thus outlined was incised with a #15 scalpel blade.
Melolabial Transposition Flap Text: The defect edges were debeveled with a #15 scalpel blade.  Given the location of the defect and the proximity to free margins a melolabial flap was deemed most appropriate.  Using a sterile surgical marker, an appropriate melolabial transposition flap was drawn incorporating the defect.    The area thus outlined was incised deep to adipose tissue with a #15 scalpel blade.  The skin margins were undermined to an appropriate distance in all directions utilizing iris scissors.
Rectangular Flap Text: The defect edges were debeveled with a #15 scalpel blade. Given the location of the defect and the proximity to free margins a rectangular flap was deemed most appropriate. Using a sterile surgical marker, an appropriate rectangular flap was drawn incorporating the defect. The area thus outlined was incised deep to adipose tissue with a #15 scalpel blade. The skin margins were undermined to an appropriate distance in all directions utilizing iris scissors. Following this, the designed flap was carried over into the primary defect and sutured into place.
Rhombic Flap Text: The defect edges were debeveled with a #15 scalpel blade.  Given the location of the defect and the proximity to free margins a rhombic flap was deemed most appropriate.  Using a sterile surgical marker, an appropriate rhombic flap was drawn incorporating the defect.    The area thus outlined was incised deep to adipose tissue with a #15 scalpel blade.  The skin margins were undermined to an appropriate distance in all directions utilizing iris scissors.
Rhomboid Transposition Flap Text: The defect edges were debeveled with a #15 scalpel blade.  Given the location of the defect and the proximity to free margins a rhomboid transposition flap was deemed most appropriate.  Using a sterile surgical marker, an appropriate rhomboid flap was drawn incorporating the defect.    The area thus outlined was incised deep to adipose tissue with a #15 scalpel blade.  The skin margins were undermined to an appropriate distance in all directions utilizing iris scissors.
Bi-Rhombic Flap Text: The defect edges were debeveled with a #15 scalpel blade.  Given the location of the defect and the proximity to free margins a bi-rhombic flap was deemed most appropriate.  Using a sterile surgical marker, an appropriate rhombic flap was drawn incorporating the defect. The area thus outlined was incised deep to adipose tissue with a #15 scalpel blade.  The skin margins were undermined to an appropriate distance in all directions utilizing iris scissors.
Helical Rim Advancement Flap Text: The defect edges were debeveled with a #15 blade scalpel.  Given the location of the defect and the proximity to free margins (helical rim) a double helical rim advancement flap was deemed most appropriate.  Using a sterile surgical marker, the appropriate advancement flaps were drawn incorporating the defect and placing the expected incisions between the helical rim and antihelix where possible.  The area thus outlined was incised through and through with a #15 scalpel blade.  With a skin hook and iris scissors, the flaps were gently and sharply undermined and freed up.
Bilateral Helical Rim Advancement Flap Text: The defect edges were debeveled with a #15 blade scalpel.  Given the location of the defect and the proximity to free margins (helical rim) a bilateral helical rim advancement flap was deemed most appropriate.  Using a sterile surgical marker, the appropriate advancement flaps were drawn incorporating the defect and placing the expected incisions between the helical rim and antihelix where possible.  The area thus outlined was incised through and through with a #15 scalpel blade.  With a skin hook and iris scissors, the flaps were gently and sharply undermined and freed up.
Ear Star Wedge Flap Text: The defect edges were debeveled with a #15 blade scalpel.  Given the location of the defect and the proximity to free margins (helical rim) an ear star wedge flap was deemed most appropriate.  Using a sterile surgical marker, the appropriate flap was drawn incorporating the defect and placing the expected incisions between the helical rim and antihelix where possible.  The area thus outlined was incised through and through with a #15 scalpel blade.
Flip-Flop Flap Text: The defect edges were debeveled with a #15 blade scalpel.  Given the location of the defect and the proximity to free margins a flip-flop flap was deemed most appropriate. Using a sterile surgical marker, the appropriate flap was drawn incorporating the defect and placing the expected incisions between the helical rim and antihelix where possible.  The area thus outlined was incised through and through with a #15 scalpel blade. Following this, the designed flap was carried over into the primary defect and sutured into place.
Banner Transposition Flap Text: The defect edges were debeveled with a #15 scalpel blade.  Given the location of the defect and the proximity to free margins a Banner transposition flap was deemed most appropriate.  Using a sterile surgical marker, an appropriate flap drawn around the defect. The area thus outlined was incised deep to adipose tissue with a #15 scalpel blade.  The skin margins were undermined to an appropriate distance in all directions utilizing iris scissors.
Bilobed Flap Text: The defect edges were debeveled with a #15 scalpel blade.  Given the location of the defect and the proximity to free margins a bilobe flap was deemed most appropriate.  Using a sterile surgical marker, an appropriate bilobe flap drawn around the defect.    The area thus outlined was incised deep to adipose tissue with a #15 scalpel blade.  The skin margins were undermined to an appropriate distance in all directions utilizing iris scissors.
Bilobed Transposition Flap Text: The defect edges were debeveled with a #15 scalpel blade.  Given the location of the defect and the proximity to free margins a bilobed transposition flap was deemed most appropriate.  Using a sterile surgical marker, an appropriate bilobe flap drawn around the defect.    The area thus outlined was incised deep to adipose tissue with a #15 scalpel blade.  The skin margins were undermined to an appropriate distance in all directions utilizing iris scissors.
Trilobed Flap Text: The defect edges were debeveled with a #15 scalpel blade.  Given the location of the defect and the proximity to free margins a trilobed flap was deemed most appropriate.  Using a sterile surgical marker, an appropriate trilobed flap drawn around the defect.    The area thus outlined was incised deep to adipose tissue with a #15 scalpel blade.  The skin margins were undermined to an appropriate distance in all directions utilizing iris scissors.
Dorsal Nasal Flap Text: The defect edges were debeveled with a #15 scalpel blade.  Given the location of the defect and the proximity to free margins a dorsal nasal flap was deemed most appropriate.  Using a sterile surgical marker, an appropriate dorsal nasal flap was drawn around the defect.    The area thus outlined was incised deep to adipose tissue with a #15 scalpel blade.  The skin margins were undermined to an appropriate distance in all directions utilizing iris scissors.
Island Pedicle Flap Text: The defect edges were debeveled with a #15 scalpel blade.  Given the location of the defect, shape of the defect and the proximity to free margins an island pedicle advancement flap was deemed most appropriate.  Using a sterile surgical marker, an appropriate advancement flap was drawn incorporating the defect, outlining the appropriate donor tissue and placing the expected incisions within the relaxed skin tension lines where possible.    The area thus outlined was incised deep to adipose tissue with a #15 scalpel blade.  The skin margins were undermined to an appropriate distance in all directions around the primary defect and laterally outward around the island pedicle utilizing iris scissors.  There was minimal undermining beneath the pedicle flap.
Island Pedicle Flap With Canthal Suspension Text: The defect edges were debeveled with a #15 scalpel blade.  Given the location of the defect, shape of the defect and the proximity to free margins an island pedicle advancement flap was deemed most appropriate.  Using a sterile surgical marker, an appropriate advancement flap was drawn incorporating the defect, outlining the appropriate donor tissue and placing the expected incisions within the relaxed skin tension lines where possible. The area thus outlined was incised deep to adipose tissue with a #15 scalpel blade.  The skin margins were undermined to an appropriate distance in all directions around the primary defect and laterally outward around the island pedicle utilizing iris scissors.  There was minimal undermining beneath the pedicle flap. A suspension suture was placed in the canthal tendon to prevent tension and prevent ectropion.
Alar Island Pedicle Flap Text: The defect edges were debeveled with a #15 scalpel blade.  Given the location of the defect, shape of the defect and the proximity to the alar rim an island pedicle advancement flap was deemed most appropriate.  Using a sterile surgical marker, an appropriate advancement flap was drawn incorporating the defect, outlining the appropriate donor tissue and placing the expected incisions within the nasal ala running parallel to the alar rim. The area thus outlined was incised with a #15 scalpel blade.  The skin margins were undermined minimally to an appropriate distance in all directions around the primary defect and laterally outward around the island pedicle utilizing iris scissors.  There was minimal undermining beneath the pedicle flap.
Double Island Pedicle Flap Text: The defect edges were debeveled with a #15 scalpel blade.  Given the location of the defect, shape of the defect and the proximity to free margins a double island pedicle advancement flap was deemed most appropriate.  Using a sterile surgical marker, an appropriate advancement flap was drawn incorporating the defect, outlining the appropriate donor tissue and placing the expected incisions within the relaxed skin tension lines where possible.    The area thus outlined was incised deep to adipose tissue with a #15 scalpel blade.  The skin margins were undermined to an appropriate distance in all directions around the primary defect and laterally outward around the island pedicle utilizing iris scissors.  There was minimal undermining beneath the pedicle flap.
Island Pedicle Flap-Requiring Vessel Identification Text: The defect edges were debeveled with a #15 scalpel blade.  Given the location of the defect, shape of the defect and the proximity to free margins an island pedicle advancement flap was deemed most appropriate.  Using a sterile surgical marker, an appropriate advancement flap was drawn, based on the axial vessel mentioned above, incorporating the defect, outlining the appropriate donor tissue and placing the expected incisions within the relaxed skin tension lines where possible.    The area thus outlined was incised deep to adipose tissue with a #15 scalpel blade.  The skin margins were undermined to an appropriate distance in all directions around the primary defect and laterally outward around the island pedicle utilizing iris scissors.  There was minimal undermining beneath the pedicle flap.
Keystone Flap Text: The defect edges were debeveled with a #15 scalpel blade.  Given the location of the defect, shape of the defect a keystone flap was deemed most appropriate.  Using a sterile surgical marker, an appropriate keystone flap was drawn incorporating the defect, outlining the appropriate donor tissue and placing the expected incisions within the relaxed skin tension lines where possible. The area thus outlined was incised deep to adipose tissue with a #15 scalpel blade.  The skin margins were undermined to an appropriate distance in all directions around the primary defect and laterally outward around the flap utilizing iris scissors.
O-T Plasty Text: The defect edges were debeveled with a #15 scalpel blade.  Given the location of the defect, shape of the defect and the proximity to free margins an O-T plasty was deemed most appropriate.  Using a sterile surgical marker, an appropriate O-T plasty was drawn incorporating the defect and placing the expected incisions within the relaxed skin tension lines where possible.    The area thus outlined was incised deep to adipose tissue with a #15 scalpel blade.  The skin margins were undermined to an appropriate distance in all directions utilizing iris scissors.
O-Z Plasty Text: The defect edges were debeveled with a #15 scalpel blade.  Given the location of the defect, shape of the defect and the proximity to free margins an O-Z plasty (double transposition flap) was deemed most appropriate.  Using a sterile surgical marker, the appropriate transposition flaps were drawn incorporating the defect and placing the expected incisions within the relaxed skin tension lines where possible.    The area thus outlined was incised deep to adipose tissue with a #15 scalpel blade.  The skin margins were undermined to an appropriate distance in all directions utilizing iris scissors.  Hemostasis was achieved with electrocautery.  The flaps were then transposed into place, one clockwise and the other counterclockwise, and anchored with interrupted buried subcutaneous sutures.
Double O-Z Plasty Text: The defect edges were debeveled with a #15 scalpel blade.  Given the location of the defect, shape of the defect and the proximity to free margins a Double O-Z plasty (double transposition flap) was deemed most appropriate.  Using a sterile surgical marker, the appropriate transposition flaps were drawn incorporating the defect and placing the expected incisions within the relaxed skin tension lines where possible. The area thus outlined was incised deep to adipose tissue with a #15 scalpel blade.  The skin margins were undermined to an appropriate distance in all directions utilizing iris scissors.  Hemostasis was achieved with electrocautery.  The flaps were then transposed into place, one clockwise and the other counterclockwise, and anchored with interrupted buried subcutaneous sutures.
V-Y Plasty Text: The defect edges were debeveled with a #15 scalpel blade.  Given the location of the defect, shape of the defect and the proximity to free margins an V-Y advancement flap was deemed most appropriate.  Using a sterile surgical marker, an appropriate advancement flap was drawn incorporating the defect and placing the expected incisions within the relaxed skin tension lines where possible.    The area thus outlined was incised deep to adipose tissue with a #15 scalpel blade.  The skin margins were undermined to an appropriate distance in all directions utilizing iris scissors.
H Plasty Text: Given the location of the defect, shape of the defect and the proximity to free margins a H-plasty was deemed most appropriate for repair.  Using a sterile surgical marker, the appropriate advancement arms of the H-plasty were drawn incorporating the defect and placing the expected incisions within the relaxed skin tension lines where possible. The area thus outlined was incised deep to adipose tissue with a #15 scalpel blade. The skin margins were undermined to an appropriate distance in all directions utilizing iris scissors.  The opposing advancement arms were then advanced into place in opposite direction and anchored with interrupted buried subcutaneous sutures.
W Plasty Text: The lesion was extirpated to the level of the fat with a #15 scalpel blade.  Given the location of the defect, shape of the defect and the proximity to free margins a W-plasty was deemed most appropriate for repair.  Using a sterile surgical marker, the appropriate transposition arms of the W-plasty were drawn incorporating the defect and placing the expected incisions within the relaxed skin tension lines where possible.    The area thus outlined was incised deep to adipose tissue with a #15 scalpel blade.  The skin margins were undermined to an appropriate distance in all directions utilizing iris scissors.  The opposing transposition arms were then transposed into place in opposite direction and anchored with interrupted buried subcutaneous sutures.
Z Plasty Text: The lesion was extirpated to the level of the fat with a #15 scalpel blade.  Given the location of the defect, shape of the defect and the proximity to free margins a Z-plasty was deemed most appropriate for repair.  Using a sterile surgical marker, the appropriate transposition arms of the Z-plasty were drawn incorporating the defect and placing the expected incisions within the relaxed skin tension lines where possible.    The area thus outlined was incised deep to adipose tissue with a #15 scalpel blade.  The skin margins were undermined to an appropriate distance in all directions utilizing iris scissors.  The opposing transposition arms were then transposed into place in opposite direction and anchored with interrupted buried subcutaneous sutures.
Double Z Plasty Text: The lesion was extirpated to the level of the fat with a #15 scalpel blade. Given the location of the defect, shape of the defect and the proximity to free margins a double Z-plasty was deemed most appropriate for repair. Using a sterile surgical marker, the appropriate transposition arms of the double Z-plasty were drawn incorporating the defect and placing the expected incisions within the relaxed skin tension lines where possible. The area thus outlined was incised deep to adipose tissue with a #15 scalpel blade. The skin margins were undermined to an appropriate distance in all directions utilizing iris scissors. The opposing transposition arms were then transposed and carried over into place in opposite direction and anchored with interrupted buried subcutaneous sutures.
Zygomaticofacial Flap Text: Given the location of the defect, shape of the defect and the proximity to free margins a zygomaticofacial flap was deemed most appropriate for repair.  Using a sterile surgical marker, the appropriate flap was drawn incorporating the defect and placing the expected incisions within the relaxed skin tension lines where possible. The area thus outlined was incised deep to adipose tissue with a #15 scalpel blade with preservation of a vascular pedicle.  The skin margins were undermined to an appropriate distance in all directions utilizing iris scissors.  The flap was then placed into the defect and anchored with interrupted buried subcutaneous sutures.
Cheek Interpolation Flap Text: A decision was made to reconstruct the defect utilizing an interpolation axial flap and a staged reconstruction.  A telfa template was made of the defect.  This telfa template was then used to outline the Cheek Interpolation flap.  The donor area for the pedicle flap was then injected with anesthesia.  The flap was excised through the skin and subcutaneous tissue down to the layer of the underlying musculature.  The interpolation flap was carefully excised within this deep plane to maintain its blood supply.  The edges of the donor site were undermined.   The donor site was closed in a primary fashion.  The pedicle was then rotated into position and sutured.  Once the tube was sutured into place, adequate blood supply was confirmed with blanching and refill.  The pedicle was then wrapped with xeroform gauze and dressed appropriately with a telfa and gauze bandage to ensure continued blood supply and protect the attached pedicle.
Cheek-To-Nose Interpolation Flap Text: A decision was made to reconstruct the defect utilizing an interpolation axial flap and a staged reconstruction.  A telfa template was made of the defect.  This telfa template was then used to outline the Cheek-To-Nose Interpolation flap.  The donor area for the pedicle flap was then injected with anesthesia.  The flap was excised through the skin and subcutaneous tissue down to the layer of the underlying musculature.  The interpolation flap was carefully excised within this deep plane to maintain its blood supply.  The edges of the donor site were undermined.   The donor site was closed in a primary fashion.  The pedicle was then rotated into position and sutured.  Once the tube was sutured into place, adequate blood supply was confirmed with blanching and refill.  The pedicle was then wrapped with xeroform gauze and dressed appropriately with a telfa and gauze bandage to ensure continued blood supply and protect the attached pedicle.
Interpolation Flap Text: A decision was made to reconstruct the defect utilizing an interpolation axial flap and a staged reconstruction.  A telfa template was made of the defect.  This telfa template was then used to outline the interpolation flap.  The donor area for the pedicle flap was then injected with anesthesia.  The flap was excised through the skin and subcutaneous tissue down to the layer of the underlying musculature.  The interpolation flap was carefully excised within this deep plane to maintain its blood supply.  The edges of the donor site were undermined.   The donor site was closed in a primary fashion.  The pedicle was then rotated into position and sutured.  Once the tube was sutured into place, adequate blood supply was confirmed with blanching and refill.  The pedicle was then wrapped with xeroform gauze and dressed appropriately with a telfa and gauze bandage to ensure continued blood supply and protect the attached pedicle.
Melolabial Interpolation Flap Text: A decision was made to reconstruct the defect utilizing an interpolation axial flap and a staged reconstruction.  A telfa template was made of the defect.  This telfa template was then used to outline the melolabial interpolation flap.  The donor area for the pedicle flap was then injected with anesthesia.  The flap was excised through the skin and subcutaneous tissue down to the layer of the underlying musculature.  The pedicle flap was carefully excised within this deep plane to maintain its blood supply.  The edges of the donor site were undermined.   The donor site was closed in a primary fashion.  The pedicle was then rotated into position and sutured.  Once the tube was sutured into place, adequate blood supply was confirmed with blanching and refill.  The pedicle was then wrapped with xeroform gauze and dressed appropriately with a telfa and gauze bandage to ensure continued blood supply and protect the attached pedicle.
Mastoid Interpolation Flap Text: A decision was made to reconstruct the defect utilizing an interpolation axial flap and a staged reconstruction.  A telfa template was made of the defect.  This telfa template was then used to outline the mastoid interpolation flap.  The donor area for the pedicle flap was then injected with anesthesia.  The flap was excised through the skin and subcutaneous tissue down to the layer of the underlying musculature.  The pedicle flap was carefully excised within this deep plane to maintain its blood supply.  The edges of the donor site were undermined.   The donor site was closed in a primary fashion.  The pedicle was then rotated into position and sutured.  Once the tube was sutured into place, adequate blood supply was confirmed with blanching and refill.  The pedicle was then wrapped with xeroform gauze and dressed appropriately with a telfa and gauze bandage to ensure continued blood supply and protect the attached pedicle.
Posterior Auricular Interpolation Flap Text: A decision was made to reconstruct the defect utilizing an interpolation axial flap and a staged reconstruction.  A telfa template was made of the defect.  This telfa template was then used to outline the posterior auricular interpolation flap.  The donor area for the pedicle flap was then injected with anesthesia.  The flap was excised through the skin and subcutaneous tissue down to the layer of the underlying musculature.  The pedicle flap was carefully excised within this deep plane to maintain its blood supply.  The edges of the donor site were undermined.   The donor site was closed in a primary fashion.  The pedicle was then rotated into position and sutured.  Once the tube was sutured into place, adequate blood supply was confirmed with blanching and refill.  The pedicle was then wrapped with xeroform gauze and dressed appropriately with a telfa and gauze bandage to ensure continued blood supply and protect the attached pedicle.
Paramedian Forehead Flap Text: A decision was made to reconstruct the defect utilizing an interpolation axial flap and a staged reconstruction.  A telfa template was made of the defect.  This telfa template was then used to outline the paramedian forehead pedicle flap.  The donor area for the pedicle flap was then injected with anesthesia.  The flap was excised through the skin and subcutaneous tissue down to the layer of the underlying musculature.  The pedicle flap was carefully excised within this deep plane to maintain its blood supply.  The edges of the donor site were undermined.   The donor site was closed in a primary fashion.  The pedicle was then rotated into position and sutured.  Once the tube was sutured into place, adequate blood supply was confirmed with blanching and refill.  The pedicle was then wrapped with xeroform gauze and dressed appropriately with a telfa and gauze bandage to ensure continued blood supply and protect the attached pedicle.
Abbe Flap (Upper To Lower Lip) Text: The defect of the lower lip was assessed and measured.  Given the location and size of the defect, an Abbe flap was deemed most appropriate. Using a sterile surgical marker, an appropriate Abbe flap was measured and drawn on the upper lip. Local anesthesia was then infiltrated.  A scalpel was then used to incise the upper lip through and through the skin, vermilion, muscle and mucosa, leaving the flap pedicled on the opposite side.  The flap was then rotated and transferred to the lower lip defect.  The flap was then sutured into place with a three layer technique, closing the orbicularis oris muscle layer with subcutaneous buried sutures, followed by a mucosal layer and an epidermal layer.
Abbe Flap (Lower To Upper Lip) Text: The defect of the upper lip was assessed and measured.  Given the location and size of the defect, an Abbe flap was deemed most appropriate. Using a sterile surgical marker, an appropriate Abbe flap was measured and drawn on the lower lip. Local anesthesia was then infiltrated. A scalpel was then used to incise the upper lip through and through the skin, vermilion, muscle and mucosa, leaving the flap pedicled on the opposite side.  The flap was then rotated and transferred to the lower lip defect.  The flap was then sutured into place with a three layer technique, closing the orbicularis oris muscle layer with subcutaneous buried sutures, followed by a mucosal layer and an epidermal layer.
Estlander Flap (Upper To Lower Lip) Text: The defect of the lower lip was assessed and measured.  Given the location and size of the defect, an Estlander flap was deemed most appropriate. Using a sterile surgical marker, an appropriate Estlander flap was measured and drawn on the upper lip. Local anesthesia was then infiltrated. A scalpel was then used to incise the lateral aspect of the flap, through skin, muscle and mucosa, leaving the flap pedicled medially.  The flap was then rotated and positioned to fill the lower lip defect.  The flap was then sutured into place with a three layer technique, closing the orbicularis oris muscle layer with subcutaneous buried sutures, followed by a mucosal layer and an epidermal layer.
Lip Wedge Excision Repair Text: Given the location of the defect and the proximity to free margins a full thickness wedge repair was deemed most appropriate.  Using a sterile surgical marker, the appropriate repair was drawn incorporating the defect and placing the expected incisions perpendicular to the vermilion border.  The vermilion border was also meticulously outlined to ensure appropriate reapproximation during the repair.  The area thus outlined was incised through and through with a #15 scalpel blade.  The muscularis and dermis were reaproximated with deep sutures following hemostasis. Care was taken to realign the vermilion border before proceeding with the superficial closure.  Once the vermilion was realigned the superfical and mucosal closure was finished.
Ftsg Text: The defect edges were debeveled with a #15 scalpel blade.  Given the location of the defect, shape of the defect and the proximity to free margins a full thickness skin graft was deemed most appropriate.  Using a sterile surgical marker, the primary defect shape was transferred to the donor site. The area thus outlined was incised deep to adipose tissue with a #15 scalpel blade.  The harvested graft was then trimmed of adipose tissue until only dermis and epidermis was left.  The skin margins of the secondary defect were undermined to an appropriate distance in all directions utilizing iris scissors.  The secondary defect was closed with interrupted buried subcutaneous sutures.  The skin edges were then re-apposed with running  sutures.  The skin graft was then placed in the primary defect and oriented appropriately.
Split-Thickness Skin Graft Text: The defect edges were debeveled with a #15 scalpel blade.  Given the location of the defect, shape of the defect and the proximity to free margins a split thickness skin graft was deemed most appropriate.  Using a sterile surgical marker, the primary defect shape was transferred to the donor site. The split thickness graft was then harvested.  The skin graft was then placed in the primary defect and oriented appropriately.
Pinch Graft Text: The defect edges were debeveled with a #15 scalpel blade. Given the location of the defect, shape of the defect and the proximity to free margins a pinch graft was deemed most appropriate. Using a sterile surgical marker, the primary defect shape was transferred to the donor site. The area thus outlined was incised deep to adipose tissue with a #15 scalpel blade.  The harvested graft was then trimmed of adipose tissue until only dermis and epidermis was left. The skin graft was then placed in the primary defect and oriented appropriately.
Burow's Graft Text: The defect edges were debeveled with a #15 scalpel blade.  Given the location of the defect, shape of the defect, the proximity to free margins and the presence of a standing cone deformity a Burow's skin graft was deemed most appropriate. The standing cone was removed and this tissue was then trimmed to the shape of the primary defect. The adipose tissue was also removed until only dermis and epidermis were left.  The skin margins of the secondary defect were undermined to an appropriate distance in all directions utilizing iris scissors.  The secondary defect was closed with interrupted buried subcutaneous sutures.  The skin edges were then re-apposed with running  sutures.  The skin graft was then placed in the primary defect and oriented appropriately.
Cartilage Graft Text: The defect edges were debeveled with a #15 scalpel blade.  Given the location of the defect, shape of the defect, the fact the defect involved a full thickness cartilage defect a cartilage graft was deemed most appropriate.  An appropriate donor site was identified, cleansed, and anesthetized. The cartilage graft was then harvested and transferred to the recipient site, oriented appropriately and then sutured into place.  The secondary defect was then repaired using a primary closure.
Composite Graft Text: The defect edges were debeveled with a #15 scalpel blade.  Given the location of the defect, shape of the defect, the proximity to free margins and the fact the defect was full thickness a composite graft was deemed most appropriate.  The defect was outline and then transferred to the donor site.  A full thickness graft was then excised from the donor site. The graft was then placed in the primary defect, oriented appropriately and then sutured into place.  The secondary defect was then repaired using a primary closure.
Epidermal Autograft Text: The defect edges were debeveled with a #15 scalpel blade.  Given the location of the defect, shape of the defect and the proximity to free margins an epidermal autograft was deemed most appropriate.  Using a sterile surgical marker, the primary defect shape was transferred to the donor site. The epidermal graft was then harvested.  The skin graft was then placed in the primary defect and oriented appropriately.
Dermal Autograft Text: The defect edges were debeveled with a #15 scalpel blade.  Given the location of the defect, shape of the defect and the proximity to free margins a dermal autograft was deemed most appropriate.  Using a sterile surgical marker, the primary defect shape was transferred to the donor site. The area thus outlined was incised deep to adipose tissue with a #15 scalpel blade.  The harvested graft was then trimmed of adipose and epidermal tissue until only dermis was left.  The skin graft was then placed in the primary defect and oriented appropriately.
Skin Substitute Text: The defect edges were debeveled with a #15 scalpel blade.  Given the location of the defect, shape of the defect and the proximity to free margins a skin substitute graft was deemed most appropriate.  The graft material was trimmed to fit the size of the defect. The graft was then placed in the primary defect and oriented appropriately.
Tissue Cultured Epidermal Autograft Text: The defect edges were debeveled with a #15 scalpel blade.  Given the location of the defect, shape of the defect and the proximity to free margins a tissue cultured epidermal autograft was deemed most appropriate.  The graft was then trimmed to fit the size of the defect.  The graft was then placed in the primary defect and oriented appropriately.
Xenograft Text: The defect edges were debeveled with a #15 scalpel blade.  Given the location of the defect, shape of the defect and the proximity to free margins a xenograft was deemed most appropriate.  The graft was then trimmed to fit the size of the defect.  The graft was then placed in the primary defect and oriented appropriately.
Purse String (Intermediate) Text: Given the location of the defect and the characteristics of the surrounding skin a purse string intermediate closure was deemed most appropriate.  Undermining was performed circumfirentially around the surgical defect.  A purse string suture was then placed and tightened.
Purse String (Simple) Text: Given the location of the defect and the characteristics of the surrounding skin a purse string simple closure was deemed most appropriate.  Undermining was performed circumferentially around the surgical defect.  A purse string suture was then placed and tightened.
Partial Purse String (Intermediate) Text: Given the location of the defect and the characteristics of the surrounding skin an intermediate purse string closure was deemed most appropriate.  Undermining was performed circumferentially around the surgical defect.  A purse string suture was then placed and tightened. Wound tension of the circular defect prevented complete closure of the wound.
Partial Purse String (Simple) Text: Given the location of the defect and the characteristics of the surrounding skin a simple purse string closure was deemed most appropriate.  Undermining was performed circumferentially around the surgical defect.  A purse string suture was then placed and tightened. Wound tension of the circular defect prevented complete closure of the wound.
Complex Repair And Single Advancement Flap Text: The defect edges were debeveled with a #15 scalpel blade.  The primary defect was closed partially with a complex linear closure.  Given the location of the remaining defect, shape of the defect and the proximity to free margins a single advancement flap was deemed most appropriate for complete closure of the defect.  Using a sterile surgical marker, an appropriate advancement flap was drawn incorporating the defect and placing the expected incisions within the relaxed skin tension lines where possible.    The area thus outlined was incised deep to adipose tissue with a #15 scalpel blade.  The skin margins were undermined to an appropriate distance in all directions utilizing iris scissors.
Complex Repair And Double Advancement Flap Text: The defect edges were debeveled with a #15 scalpel blade.  The primary defect was closed partially with a complex linear closure.  Given the location of the remaining defect, shape of the defect and the proximity to free margins a double advancement flap was deemed most appropriate for complete closure of the defect.  Using a sterile surgical marker, an appropriate advancement flap was drawn incorporating the defect and placing the expected incisions within the relaxed skin tension lines where possible.    The area thus outlined was incised deep to adipose tissue with a #15 scalpel blade.  The skin margins were undermined to an appropriate distance in all directions utilizing iris scissors.
Complex Repair And Modified Advancement Flap Text: The defect edges were debeveled with a #15 scalpel blade.  The primary defect was closed partially with a complex linear closure.  Given the location of the remaining defect, shape of the defect and the proximity to free margins a modified advancement flap was deemed most appropriate for complete closure of the defect.  Using a sterile surgical marker, an appropriate advancement flap was drawn incorporating the defect and placing the expected incisions within the relaxed skin tension lines where possible.    The area thus outlined was incised deep to adipose tissue with a #15 scalpel blade.  The skin margins were undermined to an appropriate distance in all directions utilizing iris scissors.
Complex Repair And A-T Advancement Flap Text: The defect edges were debeveled with a #15 scalpel blade.  The primary defect was closed partially with a complex linear closure.  Given the location of the remaining defect, shape of the defect and the proximity to free margins an A-T advancement flap was deemed most appropriate for complete closure of the defect.  Using a sterile surgical marker, an appropriate advancement flap was drawn incorporating the defect and placing the expected incisions within the relaxed skin tension lines where possible.    The area thus outlined was incised deep to adipose tissue with a #15 scalpel blade.  The skin margins were undermined to an appropriate distance in all directions utilizing iris scissors.
Complex Repair And O-T Advancement Flap Text: The defect edges were debeveled with a #15 scalpel blade.  The primary defect was closed partially with a complex linear closure.  Given the location of the remaining defect, shape of the defect and the proximity to free margins an O-T advancement flap was deemed most appropriate for complete closure of the defect.  Using a sterile surgical marker, an appropriate advancement flap was drawn incorporating the defect and placing the expected incisions within the relaxed skin tension lines where possible.    The area thus outlined was incised deep to adipose tissue with a #15 scalpel blade.  The skin margins were undermined to an appropriate distance in all directions utilizing iris scissors.
Complex Repair And O-L Flap Text: The defect edges were debeveled with a #15 scalpel blade.  The primary defect was closed partially with a complex linear closure.  Given the location of the remaining defect, shape of the defect and the proximity to free margins an O-L flap was deemed most appropriate for complete closure of the defect.  Using a sterile surgical marker, an appropriate flap was drawn incorporating the defect and placing the expected incisions within the relaxed skin tension lines where possible.    The area thus outlined was incised deep to adipose tissue with a #15 scalpel blade.  The skin margins were undermined to an appropriate distance in all directions utilizing iris scissors.
Complex Repair And Bilobe Flap Text: The defect edges were debeveled with a #15 scalpel blade.  The primary defect was closed partially with a complex linear closure.  Given the location of the remaining defect, shape of the defect and the proximity to free margins a bilobe flap was deemed most appropriate for complete closure of the defect.  Using a sterile surgical marker, an appropriate advancement flap was drawn incorporating the defect and placing the expected incisions within the relaxed skin tension lines where possible.    The area thus outlined was incised deep to adipose tissue with a #15 scalpel blade.  The skin margins were undermined to an appropriate distance in all directions utilizing iris scissors.
Complex Repair And Melolabial Flap Text: The defect edges were debeveled with a #15 scalpel blade.  The primary defect was closed partially with a complex linear closure.  Given the location of the remaining defect, shape of the defect and the proximity to free margins a melolabial flap was deemed most appropriate for complete closure of the defect.  Using a sterile surgical marker, an appropriate advancement flap was drawn incorporating the defect and placing the expected incisions within the relaxed skin tension lines where possible.    The area thus outlined was incised deep to adipose tissue with a #15 scalpel blade.  The skin margins were undermined to an appropriate distance in all directions utilizing iris scissors.
Complex Repair And Rotation Flap Text: The defect edges were debeveled with a #15 scalpel blade.  The primary defect was closed partially with a complex linear closure.  Given the location of the remaining defect, shape of the defect and the proximity to free margins a rotation flap was deemed most appropriate for complete closure of the defect.  Using a sterile surgical marker, an appropriate advancement flap was drawn incorporating the defect and placing the expected incisions within the relaxed skin tension lines where possible.    The area thus outlined was incised deep to adipose tissue with a #15 scalpel blade.  The skin margins were undermined to an appropriate distance in all directions utilizing iris scissors.
Complex Repair And Rhombic Flap Text: The defect edges were debeveled with a #15 scalpel blade.  The primary defect was closed partially with a complex linear closure.  Given the location of the remaining defect, shape of the defect and the proximity to free margins a rhombic flap was deemed most appropriate for complete closure of the defect.  Using a sterile surgical marker, an appropriate advancement flap was drawn incorporating the defect and placing the expected incisions within the relaxed skin tension lines where possible.    The area thus outlined was incised deep to adipose tissue with a #15 scalpel blade.  The skin margins were undermined to an appropriate distance in all directions utilizing iris scissors.
Complex Repair And Transposition Flap Text: The defect edges were debeveled with a #15 scalpel blade.  The primary defect was closed partially with a complex linear closure.  Given the location of the remaining defect, shape of the defect and the proximity to free margins a transposition flap was deemed most appropriate for complete closure of the defect.  Using a sterile surgical marker, an appropriate advancement flap was drawn incorporating the defect and placing the expected incisions within the relaxed skin tension lines where possible.    The area thus outlined was incised deep to adipose tissue with a #15 scalpel blade.  The skin margins were undermined to an appropriate distance in all directions utilizing iris scissors.
Complex Repair And V-Y Plasty Text: The defect edges were debeveled with a #15 scalpel blade.  The primary defect was closed partially with a complex linear closure.  Given the location of the remaining defect, shape of the defect and the proximity to free margins a V-Y plasty was deemed most appropriate for complete closure of the defect.  Using a sterile surgical marker, an appropriate advancement flap was drawn incorporating the defect and placing the expected incisions within the relaxed skin tension lines where possible.    The area thus outlined was incised deep to adipose tissue with a #15 scalpel blade.  The skin margins were undermined to an appropriate distance in all directions utilizing iris scissors.
Complex Repair And M Plasty Text: The defect edges were debeveled with a #15 scalpel blade.  The primary defect was closed partially with a complex linear closure.  Given the location of the remaining defect, shape of the defect and the proximity to free margins an M plasty was deemed most appropriate for complete closure of the defect.  Using a sterile surgical marker, an appropriate advancement flap was drawn incorporating the defect and placing the expected incisions within the relaxed skin tension lines where possible.    The area thus outlined was incised deep to adipose tissue with a #15 scalpel blade.  The skin margins were undermined to an appropriate distance in all directions utilizing iris scissors.
Complex Repair And Double M Plasty Text: The defect edges were debeveled with a #15 scalpel blade.  The primary defect was closed partially with a complex linear closure.  Given the location of the remaining defect, shape of the defect and the proximity to free margins a double M plasty was deemed most appropriate for complete closure of the defect.  Using a sterile surgical marker, an appropriate advancement flap was drawn incorporating the defect and placing the expected incisions within the relaxed skin tension lines where possible.    The area thus outlined was incised deep to adipose tissue with a #15 scalpel blade.  The skin margins were undermined to an appropriate distance in all directions utilizing iris scissors.
Complex Repair And W Plasty Text: The defect edges were debeveled with a #15 scalpel blade.  The primary defect was closed partially with a complex linear closure.  Given the location of the remaining defect, shape of the defect and the proximity to free margins a W plasty was deemed most appropriate for complete closure of the defect.  Using a sterile surgical marker, an appropriate advancement flap was drawn incorporating the defect and placing the expected incisions within the relaxed skin tension lines where possible.    The area thus outlined was incised deep to adipose tissue with a #15 scalpel blade.  The skin margins were undermined to an appropriate distance in all directions utilizing iris scissors.
Complex Repair And Z Plasty Text: The defect edges were debeveled with a #15 scalpel blade.  The primary defect was closed partially with a complex linear closure.  Given the location of the remaining defect, shape of the defect and the proximity to free margins a Z plasty was deemed most appropriate for complete closure of the defect.  Using a sterile surgical marker, an appropriate advancement flap was drawn incorporating the defect and placing the expected incisions within the relaxed skin tension lines where possible.    The area thus outlined was incised deep to adipose tissue with a #15 scalpel blade.  The skin margins were undermined to an appropriate distance in all directions utilizing iris scissors.
Complex Repair And Dorsal Nasal Flap Text: The defect edges were debeveled with a #15 scalpel blade.  The primary defect was closed partially with a complex linear closure.  Given the location of the remaining defect, shape of the defect and the proximity to free margins a dorsal nasal flap was deemed most appropriate for complete closure of the defect.  Using a sterile surgical marker, an appropriate flap was drawn incorporating the defect and placing the expected incisions within the relaxed skin tension lines where possible.    The area thus outlined was incised deep to adipose tissue with a #15 scalpel blade.  The skin margins were undermined to an appropriate distance in all directions utilizing iris scissors.
Complex Repair And Ftsg Text: The defect edges were debeveled with a #15 scalpel blade.  The primary defect was closed partially with a complex linear closure.  Given the location of the defect, shape of the defect and the proximity to free margins a full thickness skin graft was deemed most appropriate to repair the remaining defect.  The graft was trimmed to fit the size of the remaining defect.  The graft was then placed in the primary defect, oriented appropriately, and sutured into place.
Complex Repair And Burow's Graft Text: The defect edges were debeveled with a #15 scalpel blade.  The primary defect was closed partially with a complex linear closure.  Given the location of the defect, shape of the defect, the proximity to free margins and the presence of a standing cone deformity a Burow's graft was deemed most appropriate to repair the remaining defect.  The graft was trimmed to fit the size of the remaining defect.  The graft was then placed in the primary defect, oriented appropriately, and sutured into place.
Complex Repair And Split-Thickness Skin Graft Text: The defect edges were debeveled with a #15 scalpel blade.  The primary defect was closed partially with a complex linear closure.  Given the location of the defect, shape of the defect and the proximity to free margins a split thickness skin graft was deemed most appropriate to repair the remaining defect.  The graft was trimmed to fit the size of the remaining defect.  The graft was then placed in the primary defect, oriented appropriately, and sutured into place.
Complex Repair And Epidermal Autograft Text: The defect edges were debeveled with a #15 scalpel blade.  The primary defect was closed partially with a complex linear closure.  Given the location of the defect, shape of the defect and the proximity to free margins an epidermal autograft was deemed most appropriate to repair the remaining defect.  The graft was trimmed to fit the size of the remaining defect.  The graft was then placed in the primary defect, oriented appropriately, and sutured into place.
Complex Repair And Dermal Autograft Text: The defect edges were debeveled with a #15 scalpel blade.  The primary defect was closed partially with a complex linear closure.  Given the location of the defect, shape of the defect and the proximity to free margins an dermal autograft was deemed most appropriate to repair the remaining defect.  The graft was trimmed to fit the size of the remaining defect.  The graft was then placed in the primary defect, oriented appropriately, and sutured into place.
Complex Repair And Tissue Cultured Epidermal Autograft Text: The defect edges were debeveled with a #15 scalpel blade.  The primary defect was closed partially with a complex linear closure.  Given the location of the defect, shape of the defect and the proximity to free margins an tissue cultured epidermal autograft was deemed most appropriate to repair the remaining defect.  The graft was trimmed to fit the size of the remaining defect.  The graft was then placed in the primary defect, oriented appropriately, and sutured into place.
Complex Repair And Xenograft Text: The defect edges were debeveled with a #15 scalpel blade.  The primary defect was closed partially with a complex linear closure.  Given the location of the defect, shape of the defect and the proximity to free margins a xenograft was deemed most appropriate to repair the remaining defect.  The graft was trimmed to fit the size of the remaining defect.  The graft was then placed in the primary defect, oriented appropriately, and sutured into place.
Complex Repair And Skin Substitute Graft Text: The defect edges were debeveled with a #15 scalpel blade.  The primary defect was closed partially with a complex linear closure.  Given the location of the remaining defect, shape of the defect and the proximity to free margins a skin substitute graft was deemed most appropriate to repair the remaining defect.  The graft was trimmed to fit the size of the remaining defect.  The graft was then placed in the primary defect, oriented appropriately, and sutured into place.
Include Anticoagulation In Mohs Note?: Please Select the Appropriate Response
Path Notes (To The Dermatopathologist): Please check margins.
Consent was obtained from the patient. The risks and benefits to therapy were discussed in detail. Specifically, the risks of infection, scarring, bleeding, prolonged wound healing, incomplete removal, allergy to anesthesia, nerve injury and recurrence were addressed. Prior to the procedure, the treatment site was clearly identified and confirmed by the patient.
Render Post-Care Instructions In Note?: yes
Post-Care Instructions: I reviewed with the patient in detail post-care instructions. Patient is not to engage in any heavy lifting, exercise, or swimming for the next 14 days. Should the patient develop any fevers, chills, bleeding, severe pain patient will contact the office immediately.
Home Suture Removal Text: Patient was provided a home suture removal kit and will remove their sutures at home.  If they have any questions or difficulties they will call the office.
Where Do You Want The Question To Include Opioid Counseling Located?: Case Summary Tab
Information: Selecting Yes will display possible errors in your note based on the variables you have selected. This validation is only offered as a suggestion for you. PLEASE NOTE THAT THE VALIDATION TEXT WILL BE REMOVED WHEN YOU FINALIZE YOUR NOTE. IF YOU WANT TO FAX A PRELIMINARY NOTE YOU WILL NEED TO TOGGLE THIS TO 'NO' IF YOU DO NOT WANT IT IN YOUR FAXED NOTE.

## 2025-05-20 ENCOUNTER — APPOINTMENT (OUTPATIENT)
Dept: URBAN - METROPOLITAN AREA CLINIC 329 | Facility: CLINIC | Age: 85
Setting detail: DERMATOLOGY
End: 2025-05-20

## 2025-05-20 DIAGNOSIS — Z48.02 ENCOUNTER FOR REMOVAL OF SUTURES: ICD-10-CM

## 2025-05-20 PROCEDURE — ? SUTURE REMOVAL (GLOBAL PERIOD)

## 2025-05-20 PROCEDURE — 99024 POSTOP FOLLOW-UP VISIT: CPT

## 2025-05-20 PROCEDURE — ? ADDITIONAL NOTES

## 2025-05-20 ASSESSMENT — LOCATION ZONE DERM: LOCATION ZONE: FACE

## 2025-05-20 ASSESSMENT — LOCATION DETAILED DESCRIPTION DERM: LOCATION DETAILED: LEFT INFERIOR CENTRAL MALAR CHEEK

## 2025-05-20 ASSESSMENT — LOCATION SIMPLE DESCRIPTION DERM: LOCATION SIMPLE: LEFT CHEEK

## 2025-05-20 NOTE — PROCEDURE: SUTURE REMOVAL (GLOBAL PERIOD)
Detail Level: Detailed
Add 14612 Cpt? (Important Note: In 2017 The Use Of 13771 Is Being Tracked By Cms To Determine Future Global Period Reimbursement For Global Periods): yes

## 2025-05-20 NOTE — PROCEDURE: ADDITIONAL NOTES
Render Risk Assessment In Note?: no
Detail Level: Zone
Additional Notes: Discussed results with the patient and informed him that we will keep an eye on it. Patient has next appointment scheduled for 9.29.25 where the provider will recheck the site. Informed the patient to look out for signs of reoccurrence, and encouraged him to call the office is he has any worries.

## (undated) DEVICE — INTENDED TO BE USED TO OCCLUDE, RETRACT AND IDENTIFY ARTERIES, VEINS, TENDONS AND NERVES IN SURGICAL PROCEDURES: Brand: STERION®  VESSEL LOOP

## (undated) DEVICE — [HIGH FLOW INSUFFLATOR,  DO NOT USE IF PACKAGE IS DAMAGED,  KEEP DRY,  KEEP AWAY FROM SUNLIGHT,  PROTECT FROM HEAT AND RADIOACTIVE SOURCES.]: Brand: PNEUMOSURE

## (undated) DEVICE — SUT CHRMC 4-0 27IN SH BRN --

## (undated) DEVICE — ADHESIVE SKIN CLSR 1ML TISS HI VISC EXOFIN

## (undated) DEVICE — SKIN MARKER,REGULAR TIP WITH RULER AND LABELS: Brand: DEVON

## (undated) DEVICE — SYR LR LCK 1ML GRAD NSAF 30ML --

## (undated) DEVICE — BOWL 32OZ-LF: Brand: MEDLINE INDUSTRIES, INC.

## (undated) DEVICE — SUT SLK 0 30IN CT1 BLK --

## (undated) DEVICE — CLIP INT SM TI EZ LD LIG SYS WECK HORZ

## (undated) DEVICE — COVER,MAYO STAND,STERILE: Brand: MEDLINE

## (undated) DEVICE — APPLIER CLP L9.38IN M LIG TI DISP STR RNG HNDL LIGACLP

## (undated) DEVICE — SYS VSL HARV HEMOPRO2 VASOVIEW -- HARV SYS MINIMUM ORDER 5/EA

## (undated) DEVICE — SYR 50ML LR LCK 1ML GRAD NSAF --

## (undated) DEVICE — Device: Brand: JELCO

## (undated) DEVICE — CLAMP INSERT: Brand: STEALTH® CLAMP INSERT

## (undated) DEVICE — CLIP LIG ADH PD HORZ MED BLU --

## (undated) DEVICE — DERMABOND SKIN ADH 0.7ML -- DERMABOND ADVANCED 12/BX

## (undated) DEVICE — GEL US 20GM NONIRRITATING OVERWRAPPED FILE PCH TRNSMIT

## (undated) DEVICE — TRAY FOLEY 16FR TEMP SENS F400 --

## (undated) DEVICE — SUTURE VCRL SZ 4-0 L27IN ABSRB UD L19MM PS-2 3/8 CIR PRIM J426H

## (undated) DEVICE — SYRINGE IRRIG 60ML SFT PLIABLE BLB EZ TO GRP 1 HND USE W/

## (undated) DEVICE — SUTURE PERMAHAND SZ 2-0 L12X18IN NONABSORBABLE BLK SILK A185H

## (undated) DEVICE — SUTURE PROL SZ 7-0 L24IN NONABSORBABLE BLU L9.3MM BV-1 3/8 M8702

## (undated) DEVICE — SUTURE PERMAHAND SZ 0 L30IN NONABSORBABLE BLK L30MM PSL 3/8 590H

## (undated) DEVICE — CATHETER THOR 32FR L23IN PVC 5 EYELET STR ATRAUM

## (undated) DEVICE — BLADE SAW W10XL54MM FOR PRI REPEAT STRNOTMY

## (undated) DEVICE — GOWN,REINF,POLY,ECL,PP SLV,XL: Brand: MEDLINE

## (undated) DEVICE — CLOTH PRE OP W9XL10.5IN 2% CHG FRAGRANCE RNS FREE READYPREP

## (undated) DEVICE — SUTURE NONABSORBABLE L24IN SZ 7-0 M0-5 BV175-8 EP 24 BLU M8745

## (undated) DEVICE — CAROTID DR YORK: Brand: MEDLINE INDUSTRIES, INC.

## (undated) DEVICE — STERILE HOOK LOCK LATEX FREE ELASTIC BANDAGE 4INX5YD: Brand: HOOK LOCK™

## (undated) DEVICE — REM POLYHESIVE ADULT PATIENT RETURN ELECTRODE: Brand: VALLEYLAB

## (undated) DEVICE — SUTURE VCRL SZ 1 L36IN ABSRB UD CTX L48MM 1/2 CIR J977H

## (undated) DEVICE — Device

## (undated) DEVICE — GLOVE SURG SZ 7 L11.2IN THK9.8MIL STRW LTX POLYMER BEAD CUF

## (undated) DEVICE — SUT SLK 4-0 18IN TIE MP BLK --

## (undated) DEVICE — DRAPE SLUSH DISC W44XL66IN ST FOR RND BSIN HUSH SLUSH SYS

## (undated) DEVICE — 6 FOOT DISPOSABLE EXTENSION CABLE WITH SAFE CONNECT / SCREW-DOWN

## (undated) DEVICE — APPLIER CLP SM BLK TI AUTO HNDL DISP W/ 20 CLP PREM SURGICLP

## (undated) DEVICE — 1840 FOAM BLOCK NEEDLE COUNTER: Brand: DEVON

## (undated) DEVICE — 2000CC GUARDIAN II: Brand: GUARDIAN

## (undated) DEVICE — RESERVOIR,SUCTION,100CC,SILICONE: Brand: MEDLINE

## (undated) DEVICE — SUTURE PROL SZ 5-0 L36IN NONABSORBABLE BLU L13MM C-1 3/8 8720H

## (undated) DEVICE — BUTTON SWITCH PENCIL BLADE ELECTRODE, HOLSTER: Brand: EDGE

## (undated) DEVICE — SUTURE VCRL SZ 3-0 L36IN ABSRB UD L36MM CT-1 1/2 CIR J944H

## (undated) DEVICE — INTENDED FOR TISSUE SEPARATION, AND OTHER PROCEDURES THAT REQUIRE A SHARP SURGICAL BLADE TO PUNCTURE OR CUT.: Brand: BARD-PARKER SAFETY BLADES SIZE 11, STERILE

## (undated) DEVICE — GAUZE,SPONGE,2"X2",8PLY,STERILE,LF,2'S: Brand: MEDLINE

## (undated) DEVICE — APPLIER CLP L9.375IN APER 2.1MM CLS L3.8MM 20 SM TI CLP

## (undated) DEVICE — CONNECTOR IV 3/8X3/8X3/8 Y

## (undated) DEVICE — 3M™ IOBAN™ 2 ANTIMICROBIAL INCISE DRAPE 6650EZ: Brand: IOBAN™ 2

## (undated) DEVICE — AMD ANTIMICROBIAL GAUZE SPONGES 8 PLY USP TYPE VII: Brand: CURITY

## (undated) DEVICE — PREP SKN CHLRAPRP APL 26ML STR --

## (undated) DEVICE — CANNULA PERF L1.8MM TIP L1MM S STL SHFT BLB SHP TIP FEM

## (undated) DEVICE — STERILE HOOK LOCK LATEX FREE ELASTIC BANDAGE 6INX5YD: Brand: HOOK LOCK™

## (undated) DEVICE — DRAPE TWL SURG 16X26IN BLU ORB04] ALLCARE INC]

## (undated) DEVICE — SOLUTION IV 1000ML 0.9% SOD CHL

## (undated) DEVICE — 3000CC GUARDIAN II: Brand: GUARDIAN

## (undated) DEVICE — SUTURE NONABSORBABLE MONOFILAMENT 5-0 C-1 1X24 IN PROLENE 8725H

## (undated) DEVICE — SINGLE BASIN: Brand: CARDINAL HEALTH

## (undated) DEVICE — AORTIC PUNCHES ARE USED TO CREATE A UNIFORM OPENING IN BLOOD VESSELS DURING CARDIOVASCULAR SURGERY. THE VESSEL GRAFT IS INSERTED INTO THE CREATED OPENING AND SUTURED TO THE VESSEL WALL. AORTIC LANCETS ARE USED TO MAKE A SMALL UNIFORM CUT IN A BLOOD VESSEL TO FACILITATE INSERTION OF AN AORTIC PUNCH.  PUNCHES COME IN VARIOUS LENGTHS, DIAMETERS AND TIP CONFIGURATIONS.: Brand: CLEANCUT ROTATING AORTIC PUNCH

## (undated) DEVICE — GOWN,PREVENTION PLUS,2XL,ST,22/CS: Brand: MEDLINE

## (undated) DEVICE — BIPOLAR FORCEPS CORD: Brand: VALLEYLAB

## (undated) DEVICE — NEEDLE HYPO 25GA L1.5IN BLU POLYPR HUB S STL REG BVL STR

## (undated) DEVICE — SOLUTION IRRIG 3000ML 0.9% SOD CHL FLX CONT 0797208] ICU MEDICAL INC]

## (undated) DEVICE — SUTURE PERMAHAND SZ 3-0 L18IN NONABSORBABLE BLK SILK BRAID A184H

## (undated) DEVICE — STERILE LATEX POWDER-FREE SURGICAL GLOVESWITH NITRILE COATING: Brand: PROTEXIS

## (undated) DEVICE — INTENDED FOR TISSUE SEPARATION, AND OTHER PROCEDURES THAT REQUIRE A SHARP SURGICAL BLADE TO PUNCTURE OR CUT.: Brand: BARD-PARKER SAFETY BLADES SIZE 15, STERILE

## (undated) DEVICE — BLADE OPHTH MINI BEAV SHRP --

## (undated) DEVICE — PLEDGET VASC W3/16XL3/8IN THK1/16IN PTFE SFT

## (undated) DEVICE — ABSORBENT, WATERPROOF, BACTERIA PROOF FILM DRESSING: Brand: OPSITE POST OP 9.5X8.5CM CTN 20

## (undated) DEVICE — AMD ANTIMICROBIAL BANDAGE ROLL,6 PLY: Brand: KERLIX

## (undated) DEVICE — BASIC SINGLE BASIN-LF: Brand: MEDLINE INDUSTRIES, INC.

## (undated) DEVICE — (D)PREP SKN CHLRAPRP APPL 26ML -- CONVERT TO ITEM 371833

## (undated) DEVICE — GAUZE,SPONGE,8"X4",12PLY,XRAY,STRL,LF: Brand: MEDLINE

## (undated) DEVICE — 1/4 FORCE SURGICAL SPRING CLIP: Brand: STEALTH® SPRING CLIP

## (undated) DEVICE — INTENDED FOR TISSUE SEPARATION, AND OTHER PROCEDURES THAT REQUIRE A SHARP SURGICAL BLADE TO PUNCTURE OR CUT.: Brand: BARD-PARKER SAFETY BLADES SIZE 10, STERILE

## (undated) DEVICE — CATHETER THOR 36FR L23CM DIA12MM POLYVI CHL TAPR CONN TIP

## (undated) DEVICE — SYR 10ML LUER LOK 1/5ML GRAD --

## (undated) DEVICE — WIPE INSTR HIGH ABSORBENT FAST WICKING LINT FREE COUNT 20

## (undated) DEVICE — SPONGE LAP 18X18IN STRL -- 5/PK

## (undated) DEVICE — DRAIN ROUND 15FR PERFORATED SURG L49IN DIA3/16IN 10IN H SIL W/O TRCR END PERF

## (undated) DEVICE — SS SUTURE, 3 PER SLEEVE: Brand: MYO/WIRE II

## (undated) DEVICE — DRAIN WND RND 19FR FULL FLTD --

## (undated) DEVICE — DRAPE,INSTRUMENT,MAGNETIC,10X16: Brand: MEDLINE

## (undated) DEVICE — CATH KT THOR DRY PLEUR-EVAC LF --

## (undated) DEVICE — TUBING, SUCTION, 1/4" X 10', STRAIGHT: Brand: MEDLINE

## (undated) DEVICE — DRAPE IRRIG FLD WRM W44XL44IN W/ AORN STD PRTBL INTRATEMP

## (undated) DEVICE — SUT PROL 5-0 36IN C1 DA MP BLU --

## (undated) DEVICE — SUTURE S STL SZ 6 L18IN NONABSORBABLE SIL L48MM CCS 1/2 CIR M654G

## (undated) DEVICE — AMD ANTIMICROBIAL NON-ADHERENT PAD,0.2% POLYHEXAMETHYLENE BIGUANIDE HCI (PHMB): Brand: TELFA

## (undated) DEVICE — LEAD PCMKR MYOCARDL BPLR TEMP. --

## (undated) DEVICE — SUT PROL 4-0 30IN SH1 DA BLU --

## (undated) DEVICE — SUTURE SILK PERMAHAND PRECUT 6 X 30 IN SZ 1 BLK BRAID A307H

## (undated) DEVICE — SOL ANTI-FOG 6ML MEDC -- MEDICHOICE - CONVERT TO 358427

## (undated) DEVICE — SCANLAN® SURG-I-LOOP® SILICONE LOOPS - BLUE SUPER MAXI, 5.0X1.27 MM, 16"/40 CM LONG (2/PKG): Brand: SCANLAN® SURG-I-LOOP® SILICONE LOOPS

## (undated) DEVICE — UNIVERSAL DRAPES: Brand: MEDLINE INDUSTRIES, INC.

## (undated) DEVICE — MEDI-TRACE CADENCE ADULT, DEFIBRILLATION ELECTRODE -RTS  (10 PR/PK) - ZOLL: Brand: MEDI-TRACE CADENCE

## (undated) DEVICE — SUTURE VCRL SZ 2-0 L36IN ABSRB UD L36MM CT-1 1/2 CIR J945H

## (undated) DEVICE — CANNULA INJ L2.5IN BLNT TIP 3MM CLR BODY W/ 1 W VLV DLP

## (undated) DEVICE — SUTURE PROL SZ 5-0 L24IN NONABSORBABLE BLU L13MM C-1 3/8 M8725

## (undated) DEVICE — 48" PROBE COVER W/GEL, ULTRASOUND, STERILE: Brand: SITE-RITE

## (undated) DEVICE — AMD ANTIMICROBIAL GAUZE SPONGES,12 PLY USP TYPE VII, 0.2% POLYHEXAMETHYLENE BIGUANIDE HCI (PHMB): Brand: CURITY

## (undated) DEVICE — COVER,LIGHT HANDLE,FLX,2/PK: Brand: MEDLINE INDUSTRIES, INC.

## (undated) DEVICE — CLAMP INSERT: Brand: STEALTH® FIBRA® CLAMP INSERT

## (undated) DEVICE — CATH URETH INTMIT ROB 14FR FUN -- USE ITEM 179521

## (undated) DEVICE — SUTURE ETHBND SZ 2-0 L30IN NONABSORBABLE GRN SH-2 L20MM 1/2 X582H

## (undated) DEVICE — SUTURE ETHBND EXCEL SZ 0 L18IN NONABSORBABLE GRN L36MM CT-1 CX21D

## (undated) DEVICE — INTENDED USED TO PROTECT, TAG AND HELP LOCATED SUTURES DURING SURGERY: Brand: STERION®SUTURE AID BOOTIES

## (undated) DEVICE — (D)STRIP SKN CLSR 0.5X4IN WHT --

## (undated) DEVICE — SUTURE PROL SZ 6-0 L24IN NONABSORBABLE BLU L13MM C-1 3/8 8726H

## (undated) DEVICE — SURGIFOAM SPNG SZ 100